# Patient Record
Sex: FEMALE | Race: BLACK OR AFRICAN AMERICAN | NOT HISPANIC OR LATINO
[De-identification: names, ages, dates, MRNs, and addresses within clinical notes are randomized per-mention and may not be internally consistent; named-entity substitution may affect disease eponyms.]

---

## 2017-02-21 ENCOUNTER — APPOINTMENT (OUTPATIENT)
Dept: PULMONOLOGY | Facility: CLINIC | Age: 62
End: 2017-02-21

## 2017-03-28 ENCOUNTER — APPOINTMENT (OUTPATIENT)
Dept: PULMONOLOGY | Facility: CLINIC | Age: 62
End: 2017-03-28

## 2017-03-28 VITALS
OXYGEN SATURATION: 73 % | BODY MASS INDEX: 32.62 KG/M2 | SYSTOLIC BLOOD PRESSURE: 130 MMHG | WEIGHT: 203 LBS | HEIGHT: 66 IN | HEART RATE: 94 BPM | TEMPERATURE: 98.6 F | DIASTOLIC BLOOD PRESSURE: 82 MMHG

## 2017-03-28 RX ORDER — LEVALBUTEROL TARTRATE 45 UG/1
45 AEROSOL, METERED ORAL
Qty: 1 | Refills: 2 | Status: ACTIVE | COMMUNITY
Start: 2017-03-28 | End: 1900-01-01

## 2017-03-30 ENCOUNTER — OTHER (OUTPATIENT)
Age: 62
End: 2017-03-30

## 2017-05-18 ENCOUNTER — FORM ENCOUNTER (OUTPATIENT)
Age: 62
End: 2017-05-18

## 2017-05-19 ENCOUNTER — OUTPATIENT (OUTPATIENT)
Dept: OUTPATIENT SERVICES | Facility: HOSPITAL | Age: 62
LOS: 1 days | End: 2017-05-19
Payer: MEDICARE

## 2017-05-19 PROCEDURE — 71250 CT THORAX DX C-: CPT

## 2017-05-19 PROCEDURE — 71250 CT THORAX DX C-: CPT | Mod: 26

## 2017-06-01 ENCOUNTER — APPOINTMENT (OUTPATIENT)
Dept: PULMONOLOGY | Facility: CLINIC | Age: 62
End: 2017-06-01

## 2017-06-01 VITALS
HEART RATE: 77 BPM | HEIGHT: 66 IN | TEMPERATURE: 98.6 F | SYSTOLIC BLOOD PRESSURE: 130 MMHG | WEIGHT: 197 LBS | OXYGEN SATURATION: 93 % | BODY MASS INDEX: 31.66 KG/M2 | DIASTOLIC BLOOD PRESSURE: 80 MMHG

## 2017-06-01 RX ORDER — TADALAFIL 20 MG/1
20 TABLET ORAL
Qty: 60 | Refills: 1 | Status: DISCONTINUED | COMMUNITY
Start: 2017-03-30 | End: 2017-06-01

## 2017-06-01 RX ORDER — LEVOCETIRIZINE DIHYDROCHLORIDE 5 MG/1
5 TABLET ORAL DAILY
Qty: 1 | Refills: 1 | Status: ACTIVE | COMMUNITY
Start: 2017-06-01 | End: 1900-01-01

## 2017-06-01 RX ORDER — LEVOCETIRIZINE DIHYDROCHLORIDE 5 MG/1
5 TABLET, FILM COATED ORAL DAILY
Qty: 1 | Refills: 1 | Status: DISCONTINUED | OUTPATIENT
Start: 2017-03-28 | End: 2017-06-01

## 2017-06-06 LAB — A1AT SERPL-MCNC: 117 MG/DL

## 2017-06-14 ENCOUNTER — OTHER (OUTPATIENT)
Age: 62
End: 2017-06-14

## 2017-07-05 ENCOUNTER — FORM ENCOUNTER (OUTPATIENT)
Age: 62
End: 2017-07-05

## 2017-07-06 ENCOUNTER — OUTPATIENT (OUTPATIENT)
Dept: OUTPATIENT SERVICES | Facility: HOSPITAL | Age: 62
LOS: 1 days | End: 2017-07-06
Payer: MEDICARE

## 2017-07-06 DIAGNOSIS — I27.2 OTHER SECONDARY PULMONARY HYPERTENSION: ICD-10-CM

## 2017-07-06 PROCEDURE — 93306 TTE W/DOPPLER COMPLETE: CPT | Mod: 26

## 2017-07-06 PROCEDURE — 93306 TTE W/DOPPLER COMPLETE: CPT

## 2017-07-26 ENCOUNTER — RX RENEWAL (OUTPATIENT)
Age: 62
End: 2017-07-26

## 2017-08-15 ENCOUNTER — APPOINTMENT (OUTPATIENT)
Dept: PULMONOLOGY | Facility: CLINIC | Age: 62
End: 2017-08-15

## 2017-10-31 ENCOUNTER — RX RENEWAL (OUTPATIENT)
Age: 62
End: 2017-10-31

## 2017-12-26 ENCOUNTER — APPOINTMENT (OUTPATIENT)
Dept: PULMONOLOGY | Facility: CLINIC | Age: 62
End: 2017-12-26

## 2018-02-01 ENCOUNTER — RX RENEWAL (OUTPATIENT)
Age: 63
End: 2018-02-01

## 2018-02-08 ENCOUNTER — RX RENEWAL (OUTPATIENT)
Age: 63
End: 2018-02-08

## 2018-02-08 RX ORDER — SOFT LENS DISINFECTANT
SOLUTION, NON-ORAL MISCELLANEOUS
Qty: 1 | Refills: 0 | Status: ACTIVE | COMMUNITY
Start: 2018-02-08 | End: 1900-01-01

## 2018-02-18 ENCOUNTER — RX RENEWAL (OUTPATIENT)
Age: 63
End: 2018-02-18

## 2018-02-22 ENCOUNTER — APPOINTMENT (OUTPATIENT)
Dept: PULMONOLOGY | Facility: CLINIC | Age: 63
End: 2018-02-22
Payer: MEDICARE

## 2018-02-22 VITALS
OXYGEN SATURATION: 96 % | DIASTOLIC BLOOD PRESSURE: 80 MMHG | HEIGHT: 68 IN | HEART RATE: 79 BPM | BODY MASS INDEX: 28.79 KG/M2 | WEIGHT: 190 LBS | TEMPERATURE: 98.3 F | SYSTOLIC BLOOD PRESSURE: 144 MMHG

## 2018-02-22 PROCEDURE — ZZZZZ: CPT

## 2018-02-22 PROCEDURE — 94060 EVALUATION OF WHEEZING: CPT

## 2018-02-22 PROCEDURE — 99215 OFFICE O/P EST HI 40 MIN: CPT | Mod: 25

## 2018-02-22 PROCEDURE — 94729 DIFFUSING CAPACITY: CPT

## 2018-02-22 PROCEDURE — 94727 GAS DIL/WSHOT DETER LNG VOL: CPT

## 2018-03-19 ENCOUNTER — RX RENEWAL (OUTPATIENT)
Age: 63
End: 2018-03-19

## 2018-03-20 ENCOUNTER — RX RENEWAL (OUTPATIENT)
Age: 63
End: 2018-03-20

## 2018-04-16 ENCOUNTER — FORM ENCOUNTER (OUTPATIENT)
Age: 63
End: 2018-04-16

## 2018-04-16 ENCOUNTER — RX RENEWAL (OUTPATIENT)
Age: 63
End: 2018-04-16

## 2018-04-17 ENCOUNTER — OUTPATIENT (OUTPATIENT)
Dept: OUTPATIENT SERVICES | Facility: HOSPITAL | Age: 63
LOS: 1 days | End: 2018-04-17
Payer: MEDICARE

## 2018-04-17 DIAGNOSIS — I27.20 PULMONARY HYPERTENSION, UNSPECIFIED: ICD-10-CM

## 2018-04-17 PROCEDURE — 93306 TTE W/DOPPLER COMPLETE: CPT

## 2018-04-17 PROCEDURE — 93306 TTE W/DOPPLER COMPLETE: CPT | Mod: 26

## 2018-04-24 ENCOUNTER — APPOINTMENT (OUTPATIENT)
Dept: PULMONOLOGY | Facility: CLINIC | Age: 63
End: 2018-04-24
Payer: MEDICARE

## 2018-04-24 VITALS
SYSTOLIC BLOOD PRESSURE: 130 MMHG | WEIGHT: 186 LBS | HEART RATE: 65 BPM | TEMPERATURE: 97.6 F | HEIGHT: 68 IN | OXYGEN SATURATION: 95 % | BODY MASS INDEX: 28.19 KG/M2 | DIASTOLIC BLOOD PRESSURE: 80 MMHG

## 2018-04-24 DIAGNOSIS — Z87.891 PERSONAL HISTORY OF NICOTINE DEPENDENCE: ICD-10-CM

## 2018-04-24 PROCEDURE — 94618 PULMONARY STRESS TESTING: CPT

## 2018-04-24 PROCEDURE — 94729 DIFFUSING CAPACITY: CPT

## 2018-04-24 PROCEDURE — ZZZZZ: CPT

## 2018-04-24 PROCEDURE — 99215 OFFICE O/P EST HI 40 MIN: CPT | Mod: 25

## 2018-04-24 PROCEDURE — 94010 BREATHING CAPACITY TEST: CPT | Mod: 59

## 2018-04-26 ENCOUNTER — OTHER (OUTPATIENT)
Age: 63
End: 2018-04-26

## 2018-05-09 ENCOUNTER — RX RENEWAL (OUTPATIENT)
Age: 63
End: 2018-05-09

## 2018-06-01 ENCOUNTER — CLINICAL ADVICE (OUTPATIENT)
Age: 63
End: 2018-06-01

## 2018-06-13 ENCOUNTER — RX RENEWAL (OUTPATIENT)
Age: 63
End: 2018-06-13

## 2018-06-26 ENCOUNTER — APPOINTMENT (OUTPATIENT)
Dept: PULMONOLOGY | Facility: CLINIC | Age: 63
End: 2018-06-26

## 2018-09-06 ENCOUNTER — RX RENEWAL (OUTPATIENT)
Age: 63
End: 2018-09-06

## 2018-09-27 ENCOUNTER — APPOINTMENT (OUTPATIENT)
Dept: PULMONOLOGY | Facility: CLINIC | Age: 63
End: 2018-09-27
Payer: MEDICARE

## 2018-09-27 VITALS
SYSTOLIC BLOOD PRESSURE: 110 MMHG | BODY MASS INDEX: 28.04 KG/M2 | DIASTOLIC BLOOD PRESSURE: 70 MMHG | RESPIRATION RATE: 12 BRPM | TEMPERATURE: 98.6 F | HEIGHT: 68 IN | HEART RATE: 84 BPM | OXYGEN SATURATION: 91 % | WEIGHT: 185 LBS

## 2018-09-27 PROCEDURE — 99215 OFFICE O/P EST HI 40 MIN: CPT

## 2018-09-27 RX ORDER — LEVOCETIRIZINE DIHYDROCHLORIDE 5 MG/1
5 TABLET ORAL DAILY
Qty: 30 | Refills: 3 | Status: ACTIVE | COMMUNITY
Start: 2018-09-27 | End: 1900-01-01

## 2019-01-22 ENCOUNTER — APPOINTMENT (OUTPATIENT)
Dept: PULMONOLOGY | Facility: CLINIC | Age: 64
End: 2019-01-22

## 2019-03-13 ENCOUNTER — RX RENEWAL (OUTPATIENT)
Age: 64
End: 2019-03-13

## 2019-04-22 ENCOUNTER — RX RENEWAL (OUTPATIENT)
Age: 64
End: 2019-04-22

## 2019-05-23 ENCOUNTER — APPOINTMENT (OUTPATIENT)
Dept: PULMONOLOGY | Facility: CLINIC | Age: 64
End: 2019-05-23
Payer: MEDICARE

## 2019-05-23 VITALS
WEIGHT: 187 LBS | OXYGEN SATURATION: 95 % | DIASTOLIC BLOOD PRESSURE: 80 MMHG | TEMPERATURE: 98.7 F | BODY MASS INDEX: 28.34 KG/M2 | SYSTOLIC BLOOD PRESSURE: 130 MMHG | HEIGHT: 68 IN | HEART RATE: 85 BPM

## 2019-05-23 PROCEDURE — 99215 OFFICE O/P EST HI 40 MIN: CPT | Mod: 25

## 2019-05-23 PROCEDURE — 90670 PCV13 VACCINE IM: CPT

## 2019-05-23 PROCEDURE — G0009: CPT

## 2019-05-23 NOTE — REASON FOR VISIT
[Follow-Up] : a follow-up visit [Asthma] : asthma [COPD] : COPD [Pulmonary Hypertension] : pulmonary hypertension

## 2019-05-25 NOTE — HISTORY OF PRESENT ILLNESS
[Worsened] : have worsened [None] : ~He/She~ has no significant interval events [Difficulty Breathing During Exertion] : worsened dyspnea on exertion [Feelings Of Weakness On Exertion] : stable exercise intolerance [Cough] : denies coughing [Wheezing] : denies wheezing [Regional Soft Tissue Swelling Both Lower Extremities] : denies lower extremity edema [Chest Pain Or Discomfort] : denies chest pain [Fever] : denies fever [Obstructive Sleep Apnea] : obstructive sleep apnea [FreeTextEntry1] : 63 yo AAF with Asthma/COPD presents for follow-up. Pt with diffuse centrilobular emphysema on triple therapy and daliresp, pulmonary HTN  with PAS 43mmHg on RHC on sildenafil, stable pulmonary nodules, and severe exercise restriction\par \par 5/23/19:\par She states she feels she caught a cold 2 days with nasal congestion and cough. She saw ENT yesterday who prescribed abx for sinus infection which she hasn't started yet, she'll pick them up today. She also has Rx Nasonex, which she is using once per day. She has slight wheeze when she coughs. Wasn't wheezing before that. \par She states her arthritis is bothering her in the neck and she has pain at right back at the waist. She is seeing her PMD tomorrow and has h/o kidney stones.\par She is CPAP compliant and uses it every day. She has nasal mask so has been harder to use the last two nights due to nasal congestion. \par She is on Breo 200 and Incruse. \par She is taking sildenafil 20mg BID; she states that when she was taking it TID, it caused palpitations. She was on TID dosing for about 2 months. She went back to BID dosing earlier this year. \par Still gets winded walking up 15 steps; will need to catch her breath at the top of the stairs. She can walk < 1 block before she gets short of breath. Denies chest pain. She states she has portable tanks but they're too heavy so she is not using supplemental O2 with ambulation. She may use supplemental O2 when she gets back home.

## 2019-05-25 NOTE — DISCUSSION/SUMMARY
[FreeTextEntry1] : ATTENDING'S Pertinent Exam:\par 5-23-19\par -no pallor, no icterus, arcus senilis\par -no cervical adenopathy, no JVD at 45 degrees, no cervical adenopathy, no hepatojugular reflux\par -P 2 not loud.  heart sounds are distant  \par -adequate air entry bilaterally, mildly diminished breath sounds in right infrascapular area, deep breathing provoked minimal cough\par -No articular manifestations of collagen vascular disease. \par -no lower extremity edema\par

## 2019-05-25 NOTE — END OF VISIT
[] : Fellow [>50% of Time Spent on Counseling and Coordination of Care for  ___] : Greater than 50% of the encounter time was spent on counseling and coordination of care for [unfilled] [Time Spent: ___ minutes] : I have spent [unfilled] minutes of face to face time with the patient [FreeTextEntry3] : All medical record entries made by the Scribe were at my, Dr. Larry Epps's direction and personally dictated by me.   I have reviewed the chart and agree that the record accurately reflects my personal performance of the history, physical exam, assessment and plan. I have also personally directed, reviewed, and agreed with the chart. [FreeTextEntry1] : SR

## 2019-05-25 NOTE — REVIEW OF SYSTEMS
[Eye Irritation] : ~T irritation of the eyes [Nasal Congestion] : nasal congestion [Postnasal Drip] : postnasal drip [Sinus Problems] : sinus problems [Dyspnea] : dyspnea [Wheezing] : wheezing [Hypertension] : ~T hypertension [Dysrhythmia] : dysrhythmia [Palpitations] : palpitations [Hay Fever] : hay fever [Itchy Eyes] : itching of ~T the eyes [Watery Eyes] : ~T eyes watering / discharge [Nasal Discharge] : nasal discharge [Arthralgias] : arthralgias [Headache] : headache [As Noted in HPI] : as noted in HPI [Negative] : Pulmonary Hypertension [Fever] : no fever [Chills] : no chills [Fatigue] : no fatigue [Poor Appetite] : normal appetite  [Dry Eyes] : no dryness of the eyes [Sore Throat] : no sore throat [Cough] : no cough [Sputum] : not coughing up ~M sputum [Hemoptysis] : no hemoptysis [Pleuritic Pain] : no pleuritic pain [Chest Tightness] : no chest tightness [Frequent URIs] : no frequent upper respiratory infections [Reflux] : no reflux [Hives] : no urticaria was observed [Heartburn] : no heartburn [Dysphagia] : no dysphagia [Nausea] : no nausea [Vomiting] : no vomiting [Nocturia] : no nocturia [Urgency] : no feelings of urinary urgency [Frequency] : no change in urinary frequency [Raynaud] : no Raynaud's phenomenon was observed [Dysuria] : no dysuria [Rash] : no [unfilled] rash [Scleroderma] : no scleroderma [Anemia] : no anemia [Clotting Disorder] : no clotting disorder [Dizziness] : no dizziness [Focal Weakness] : no focal weakness [Easy Bruising] : no ~M tendency for easy bruising [FreeTextEntry2] : using Refresh drops for dry eyes as per ENT [FreeTextEntry9] : Afib periodically, not frequently [Head Injury] : no head injury [de-identified] : Following seasonal allergies with ENT [FreeTextEntry7] : gastritis [de-identified] : on CPAP [de-identified] : occasional sinus headaches [FreeTextEntry6] : B/L knee pain from arthritis

## 2019-05-25 NOTE — PHYSICAL EXAM
[General Appearance - Well Developed] : well developed [Normal Appearance] : normal appearance [Well Groomed] : well groomed [General Appearance - Well Nourished] : well nourished [No Deformities] : no deformities [General Appearance - In No Acute Distress] : no acute distress [Normal Conjunctiva] : the conjunctiva exhibited no abnormalities [IV] : IV [Neck Appearance] : the appearance of the neck was normal [Heart Rate And Rhythm] : heart rate and rhythm were normal [Heart Sounds] : normal S1 and S2 [Murmurs] : no murmurs present [Arterial Pulses Normal] : the arterial pulses were normal [Respiration, Rhythm And Depth] : normal respiratory rhythm and effort [Exaggerated Use Of Accessory Muscles For Inspiration] : no accessory muscle use [Lungs Percussion] : the lungs were normal to percussion [Bowel Sounds] : normal bowel sounds [Abdomen Soft] : soft [Abdomen Tenderness] : non-tender [Abnormal Walk] : normal gait [Gait - Sufficient For Exercise Testing] : the gait was sufficient for exercise testing [Nail Clubbing] : no clubbing of the fingernails [Cyanosis, Localized] : no localized cyanosis [Skin Color & Pigmentation] : normal skin color and pigmentation [] : no rash [Skin Lesions] : no skin lesions [No Focal Deficits] : no focal deficits [Oriented To Time, Place, And Person] : oriented to person, place, and time [Impaired Insight] : insight and judgment were intact [Affect] : the affect was normal [Mood] : the mood was normal [FreeTextEntry2] : no lower extremity edema [FreeTextEntry1] : No articular manifestations of collagen vascular disease.

## 2019-05-25 NOTE — CONSULT LETTER
[Dear  ___] : Dear ~DON, [Consult Letter:] : I had the pleasure of evaluating your patient, [unfilled]. [Please see my note below.] : Please see my note below. [Consult Closing:] : Thank you very much for allowing me to participate in the care of this patient.  If you have any questions, please do not hesitate to contact me. [Sincerely,] : Sincerely, [Larry Epps, JOSE, FCCP, FCCM] : JOSE Quach, FCCP, FCCM [FreeTextEntry2] : Charly Kebede MD

## 2019-05-25 NOTE — PROCEDURE
[FreeTextEntry1] : Abhinav, DLCO, 6MWT 18:\par FVC: 2.61 L (93%)\par FEV1: 1.22 L (56%)\par FEV1/FVC: 47%\par BGZ46-99%: 0.39 L/s (19%)\par DLCO: 7.5 (32%)\par 6MWT: 240 meters, SpO2 start; 95% --> spO2 end: 85%\par Moderately severe obstructive defect. Severely reduced diffusion capacity.  6MWT was aborted at 4 minutes due to desaturation; pt also notes walk distance was limited secondary to knee pain. Reduced walk distance with significant desaturation. \par \par EXAM:  ECHOCARDIOGRAM (CARDIOL)                      PROCEDURE DATE:  2018  \par Normal left ventricular size and wall thickness.The left ventricular wall  motion is normal.The left ventricular ejection fraction is normal.The left ventricular ejection fraction is 60%.The right ventricle is normal in size and function.The left atrial size is normal.Right atrial size is normal.Structurally normal aortic valve.No aortic regurgitation noted.Mildly calcified anterior leaflet of the mitral valve.There is mild to moderate mitral regurgitation.Structurally normal tricuspid valve.There is trace to mild tricuspid regurgitation.There is mild pulmonary hypertension.The pulmonary artery systolic pressure is estimated to be 40 mmHg.Structurally normal pulmonic valve.There is trace pulmonic regurgitation.There is no\par  pericardial effusion.There is no significant change when compared to the echo from 2017.\par \par Spirometry\par FVC: 2.83 100%\par FEV1: 1.19 54%\par FEV1/FVC: 42%\par DLCO: 31%\par \par AAT: WNL (117)\par \par Echo: 17: EF 55-60%, mild PHTN with PASP 40mmHg\par \par Spirometry and DLCO 17:\par FVC: 2.71 L (95% pred) --> 2.58 L (90% pred)\par FEV1: 1.20 L (54% pred) --> 1.14 L (51% pred)\par FEV1/FVC: 44% --> 44%\par GHZ20-68%: 0.30 L/s (14% pred) --> 0.35 L/s (16% pred)\par DLCO: 8.0 (32% pred)\par Severe obstruction. Severely reduced diffusion capacity.\par \par PI max 17: 04acN3H (55% pred)\par \par 6MWT: \par Distance: 382m\par SpO2 start: 95%\par SpO2 end: 88%\par Vastly improved walk distance (about 300m more than last) and improvement in degree of desaturation (but still with significant desaturation). \par \par PFT 3/28/17:\par FVC: 2.39 L (83% pred) --> 2.22 L (77% pred)\par FEV1: 1.14 L (50% pred) --> 1.09 L (48% pred)\par FEV1/FVC: 48% --> 49%\par IFK40-69%: 0.42 L/s (19% pred) --> 0.40 L/s (18% pred)\par T.84 L (92% pred)\par RV/T%\par DLCO: 8.1 (33% pred)\par Severe obstructive defect. Normal lung volumes. Very severely reduced DLCO.  Air trapping. \par \par 6MWT 3/28/17: Pt declined due to pain from heel spur\par \par [place] with bronchodilator 16:\par FVC: 2.34 L (83% pred) --> 2.67 L (94% pred)\par FEV1: 1.07 L (48% pred) --> 1.24 L (56% pred)\par FEV1/FVC: 46% --> 46%\par TSX54-50%: 0.38 L/s (18% pred) --> 0.41 L/s (19% pred)\par 330cc increase in FVC post-bronchodilator. Moderate obstruction. \par \par CXR 16: pt somewhat rotated, haziness in the LLL which is also seen on the lateral film, cannot rule out pneumonia of the LLL\par \par PREVIOUS TESTING: \par Spirometry 16:\par FVC: 2.71 L (94% pred)\par FEv1: 1.23 L (55% pred)\par FEV1/FVC: 46%\par UYA50-78%: 0.42 L/s (19% pred)\par Improvement in FEV1 and FVC as compared to prior test.  Severe obstructive lung disease. \par \par DLCO 16: 9.3 (33% pred <-- 29%)\par Severely reduced diffusion capacity.\par \par 6MWT 16: \par Walk time: 2:00\par Distance: 86m\par SpO2 start: 89%\par SpO2 end: 82%\par Reduced walk distance but test aborted early due to desaturation.  Degree of desaturation improved since previous test. \par \par \par PFTs 16\par FVC: 1.71 (59%)\par FEV1: 0.98 (44%)\par FEV1/FVC 58%\par FEF25/75: 0.59 (27%)\par T.38 (83%)\par DLCO: 7.3 (29%)\par Severe obstructive lung disease with severe reduced DLCO. No significant bronchodilator response\par \par PFTs 16\par FVC: 2.57 89%\par FEV1: 1.19 52%\par FEV1/FVC: 46%\par DLCO 37%\par \par 6MWT:\par Baseline: 88% End: 74%\par 86 meters\par \par PFTs 12/17/15\par FVC: 2.73 (94% of pred)\par FEV1: 1.31 l (57% of pred)\par FEV1/FVC: 48%\par TFA99-33%: 0.36 L/s (59% of pred)\par %\par DLCO 43%\par \par Pt declining 6MWT today \par PRIOR STUDIES\par Spirometry:\par FVC: 2.46 L (86% of pred)\par FEV1: 1.26 l (56% of pred)\par FEV1/FVC: 51.2%\par ANA56-76%: 0.64 L/s (29% of pred)\par C/w moderate obstructive pulmonary disease.\par \par \par RHC 10/13/15:\par -Pulmonary capillary occlusion pressure: 17 mm of Hg\par -Pulmonary artery systolic: 40 mmHg\par -Pulmonary artery diastolic: 20mm Hg\par -Mean pulmonary artery pressure: 29 mm Hg\par -Right atrial pressure: 12mm Hg\par -Cardiac output was 4.43\par Based upon these numbers, the pulmonary artery diastolic and the wedge gradient is normal at 3. The trans-pulmonary pressure gradient is mildly elevated at 12. With a cardiac output of 4.43 L per minute, the pulmonary vascular resistance is normal at slightly less than 3 Woods units.  There is no increase in intrinsic pulmonary vascular resistance. The pulmonary artery pressures are also not elevated out of proportion to the left-sided pressures therefore the patient is not a candidate for advanced vasodilator therapy.\par \par \par PREVIOUS TESTING:\par TTE 12/19/15:\par LVH, nomal LVEF PA sys press 34mmHg, mild MR & TR\par \par CT chest CTAPE 08/10/15\par IMPRESSION: No evidence for pulmonary emboli. No significant change in moderate emphysematous changes. No significant change in main pulmonary arterial dilatation, possibly reflecting sequelae of pulmonary arterial hypertension.

## 2019-05-25 NOTE — ASSESSMENT
[FreeTextEntry1] : 5-23-19\par Marce's respiratory issues are summarized:\par \par 1.   Moderately obstructive airways disease (GOLD stage C- high risk because of exacerbations).    \par Her Alpha 1 antitrypsin levels are normal.  This was done since she has extensive emphysema B/L in both upper and lower lobes. She is using Breo and Incruse due to her reduced inspiratory flows.   \par Additionally, since she was having frequent exacerbations of COPD, she was started on Daliresp, which has reduced the frequency of AECOPD. \par She has been evaluated by Dr. Jose Arzate at the [facility]. We have requested her to follow up with him again.\par I would also like to get a noncontrast CT chest at this time. We will assess her for valve placement for COPD\par \par 2.   Pulmonary hypertension and hypoxemic respiratory failure\par Marce falls in the 10% of patients with COPD who develop pulmonary hypertension.    She remains on Sildenafil 20 mg bid without any significant side effects.    Her BP is 130/80 (she did not take her blood pressure medication this morning).  Because Marce is symptomatic with significant shortness of breath, she requires supplemental O2, and we need to decrease her pulmonary pressures and attempt to prevent right-heart failure, we will increase her sildenafil by 20 mg every week if well tolerated.  She will increase to:  \par -sildenafil 20 mg TID x 1 week\par -then sildenafil 20 mg in AM, 20 mg in afternoon, and 40 mg HS x 1 week\par -then sildenafil 40 mg in AM, 20 mg in afternoon, and 40 mg HS x 1 week\par -then sildenafil 40 mg TID thereafter. \par She is to continue monitoring her blood pressure as we titrate to the optimal dosage. She will call us with any problems she may experience during the titration. \par She can only walk less than one block before she needs to stop and catch her breath. She cannot carry the portable O2 tanks as they are too heavy; we had recommended that she use 4 L/min with ambulation in the past. We will try to get a portable O2 concentrator for Marce as she needs to use supplemental O2 when she ambulates. If a portable concentrator is not available, we will try to get a smaller portable tank for her, as I'd prefer her to use 2 L/min with ambulation versus nothing at all.  We will also order a full PFT and 6MWT; we will also get an echocardiogram to reassess the right-sided pressures.\par \par 3.   GEO\par Marce has GEO and is using CPAP every night.   No compliance issues or complaints were noted. \par \par 4.   Pulmonary nodules \par 3 mm nodule in left upper lobe x2 and lingula. All are micro-nodules and do not need further follow-up.  \par \par 5. Health maintenance\par Marce was administered Prevnar (13 valent) today. She will be due for Pneumovax in 05/2020.\par \par Return in 3 months

## 2019-05-26 ENCOUNTER — EMERGENCY (EMERGENCY)
Facility: HOSPITAL | Age: 64
LOS: 0 days | Discharge: HOME | End: 2019-05-26
Attending: EMERGENCY MEDICINE
Payer: MEDICARE

## 2019-05-26 VITALS
TEMPERATURE: 98 F | OXYGEN SATURATION: 96 % | DIASTOLIC BLOOD PRESSURE: 76 MMHG | HEART RATE: 74 BPM | SYSTOLIC BLOOD PRESSURE: 121 MMHG | RESPIRATION RATE: 16 BRPM

## 2019-05-26 VITALS
RESPIRATION RATE: 14 BRPM | SYSTOLIC BLOOD PRESSURE: 141 MMHG | DIASTOLIC BLOOD PRESSURE: 81 MMHG | TEMPERATURE: 99 F | HEIGHT: 66 IN | OXYGEN SATURATION: 95 % | HEART RATE: 82 BPM | WEIGHT: 186.07 LBS

## 2019-05-26 DIAGNOSIS — E78.00 PURE HYPERCHOLESTEROLEMIA, UNSPECIFIED: ICD-10-CM

## 2019-05-26 DIAGNOSIS — S39.012A STRAIN OF MUSCLE, FASCIA AND TENDON OF LOWER BACK, INITIAL ENCOUNTER: ICD-10-CM

## 2019-05-26 DIAGNOSIS — N39.0 URINARY TRACT INFECTION, SITE NOT SPECIFIED: ICD-10-CM

## 2019-05-26 DIAGNOSIS — M54.9 DORSALGIA, UNSPECIFIED: ICD-10-CM
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
## 2019-05-26 DIAGNOSIS — I48.91 UNSPECIFIED ATRIAL FIBRILLATION: ICD-10-CM

## 2019-05-26 DIAGNOSIS — I10 ESSENTIAL (PRIMARY) HYPERTENSION: ICD-10-CM

## 2019-05-26 LAB
ALBUMIN SERPL ELPH-MCNC: 4.5 G/DL — SIGNIFICANT CHANGE UP (ref 3.3–5)
ALP SERPL-CCNC: 56 U/L — SIGNIFICANT CHANGE UP (ref 40–120)
ALT FLD-CCNC: 21 U/L — SIGNIFICANT CHANGE UP (ref 12–78)
ANION GAP SERPL CALC-SCNC: 7 MMOL/L — SIGNIFICANT CHANGE UP (ref 5–17)
APPEARANCE UR: CLEAR — SIGNIFICANT CHANGE UP
APTT BLD: 34.2 SEC — SIGNIFICANT CHANGE UP (ref 28.5–37)
AST SERPL-CCNC: 17 U/L — SIGNIFICANT CHANGE UP (ref 15–37)
BASOPHILS # BLD AUTO: 0.02 K/UL — SIGNIFICANT CHANGE UP (ref 0–0.2)
BASOPHILS NFR BLD AUTO: 0.3 % — SIGNIFICANT CHANGE UP (ref 0–2)
BILIRUB SERPL-MCNC: 0.8 MG/DL — SIGNIFICANT CHANGE UP (ref 0.2–1.2)
BILIRUB UR-MCNC: NEGATIVE — SIGNIFICANT CHANGE UP
BUN SERPL-MCNC: 10 MG/DL — SIGNIFICANT CHANGE UP (ref 7–23)
CALCIUM SERPL-MCNC: 9.1 MG/DL — SIGNIFICANT CHANGE UP (ref 8.5–10.1)
CHLORIDE SERPL-SCNC: 110 MMOL/L — HIGH (ref 96–108)
CO2 SERPL-SCNC: 28 MMOL/L — SIGNIFICANT CHANGE UP (ref 22–31)
COLOR SPEC: YELLOW — SIGNIFICANT CHANGE UP
CREAT SERPL-MCNC: 0.8 MG/DL — SIGNIFICANT CHANGE UP (ref 0.5–1.3)
DIFF PNL FLD: ABNORMAL
EOSINOPHIL # BLD AUTO: 0.05 K/UL — SIGNIFICANT CHANGE UP (ref 0–0.5)
EOSINOPHIL NFR BLD AUTO: 0.6 % — SIGNIFICANT CHANGE UP (ref 0–6)
EPI CELLS # UR: SIGNIFICANT CHANGE UP
GLUCOSE SERPL-MCNC: 95 MG/DL — SIGNIFICANT CHANGE UP (ref 70–99)
GLUCOSE UR QL: NEGATIVE MG/DL — SIGNIFICANT CHANGE UP
HCT VFR BLD CALC: 39.9 % — SIGNIFICANT CHANGE UP (ref 34.5–45)
HGB BLD-MCNC: 13.4 G/DL — SIGNIFICANT CHANGE UP (ref 11.5–15.5)
IMM GRANULOCYTES NFR BLD AUTO: 0.4 % — SIGNIFICANT CHANGE UP (ref 0–1.5)
INR BLD: 1.06 RATIO — SIGNIFICANT CHANGE UP (ref 0.88–1.16)
KETONES UR-MCNC: NEGATIVE — SIGNIFICANT CHANGE UP
LEUKOCYTE ESTERASE UR-ACNC: ABNORMAL
LYMPHOCYTES # BLD AUTO: 2.33 K/UL — SIGNIFICANT CHANGE UP (ref 1–3.3)
LYMPHOCYTES # BLD AUTO: 29.8 % — SIGNIFICANT CHANGE UP (ref 13–44)
MCHC RBC-ENTMCNC: 30.7 PG — SIGNIFICANT CHANGE UP (ref 27–34)
MCHC RBC-ENTMCNC: 33.6 GM/DL — SIGNIFICANT CHANGE UP (ref 32–36)
MCV RBC AUTO: 91.3 FL — SIGNIFICANT CHANGE UP (ref 80–100)
MONOCYTES # BLD AUTO: 0.51 K/UL — SIGNIFICANT CHANGE UP (ref 0–0.9)
MONOCYTES NFR BLD AUTO: 6.5 % — SIGNIFICANT CHANGE UP (ref 2–14)
NEUTROPHILS # BLD AUTO: 4.89 K/UL — SIGNIFICANT CHANGE UP (ref 1.8–7.4)
NEUTROPHILS NFR BLD AUTO: 62.4 % — SIGNIFICANT CHANGE UP (ref 43–77)
NITRITE UR-MCNC: NEGATIVE — SIGNIFICANT CHANGE UP
NRBC # BLD: 0 /100 WBCS — SIGNIFICANT CHANGE UP (ref 0–0)
PH UR: 5 — SIGNIFICANT CHANGE UP (ref 5–8)
PLATELET # BLD AUTO: 219 K/UL — SIGNIFICANT CHANGE UP (ref 150–400)
POTASSIUM SERPL-MCNC: 4.2 MMOL/L — SIGNIFICANT CHANGE UP (ref 3.5–5.3)
POTASSIUM SERPL-SCNC: 4.2 MMOL/L — SIGNIFICANT CHANGE UP (ref 3.5–5.3)
PROT SERPL-MCNC: 8 GM/DL — SIGNIFICANT CHANGE UP (ref 6–8.3)
PROT UR-MCNC: NEGATIVE MG/DL — SIGNIFICANT CHANGE UP
PROTHROM AB SERPL-ACNC: 11.9 SEC — SIGNIFICANT CHANGE UP (ref 10–12.9)
RBC # BLD: 4.37 M/UL — SIGNIFICANT CHANGE UP (ref 3.8–5.2)
RBC # FLD: 11.8 % — SIGNIFICANT CHANGE UP (ref 10.3–14.5)
RBC CASTS # UR COMP ASSIST: ABNORMAL /HPF (ref 0–4)
SODIUM SERPL-SCNC: 145 MMOL/L — SIGNIFICANT CHANGE UP (ref 135–145)
SP GR SPEC: 1.01 — SIGNIFICANT CHANGE UP (ref 1.01–1.02)
UROBILINOGEN FLD QL: NEGATIVE MG/DL — SIGNIFICANT CHANGE UP
WBC # BLD: 7.83 K/UL — SIGNIFICANT CHANGE UP (ref 3.8–10.5)
WBC # FLD AUTO: 7.83 K/UL — SIGNIFICANT CHANGE UP (ref 3.8–10.5)
WBC UR QL: SIGNIFICANT CHANGE UP

## 2019-05-26 PROCEDURE — 99284 EMERGENCY DEPT VISIT MOD MDM: CPT

## 2019-05-26 PROCEDURE — 74176 CT ABD & PELVIS W/O CONTRAST: CPT | Mod: 26

## 2019-05-26 RX ORDER — NITROFURANTOIN MACROCRYSTAL 50 MG
1 CAPSULE ORAL
Qty: 14 | Refills: 0
Start: 2019-05-26 | End: 2019-06-01

## 2019-05-26 RX ORDER — NITROFURANTOIN MACROCRYSTAL 50 MG
100 CAPSULE ORAL ONCE
Refills: 0 | Status: COMPLETED | OUTPATIENT
Start: 2019-05-26 | End: 2019-05-26

## 2019-05-26 RX ORDER — KETOROLAC TROMETHAMINE 30 MG/ML
30 SYRINGE (ML) INJECTION ONCE
Refills: 0 | Status: DISCONTINUED | OUTPATIENT
Start: 2019-05-26 | End: 2019-05-26

## 2019-05-26 RX ORDER — TRAMADOL HYDROCHLORIDE 50 MG/1
50 TABLET ORAL ONCE
Refills: 0 | Status: DISCONTINUED | OUTPATIENT
Start: 2019-05-26 | End: 2019-05-26

## 2019-05-26 RX ORDER — TRAMADOL HYDROCHLORIDE 50 MG/1
1 TABLET ORAL
Qty: 12 | Refills: 0
Start: 2019-05-26 | End: 2019-05-28

## 2019-05-26 RX ADMIN — Medication 100 MILLIGRAM(S): at 12:54

## 2019-05-26 RX ADMIN — Medication 30 MILLIGRAM(S): at 10:47

## 2019-05-26 RX ADMIN — TRAMADOL HYDROCHLORIDE 50 MILLIGRAM(S): 50 TABLET ORAL at 10:46

## 2019-05-26 NOTE — ED ADULT NURSE NOTE - PMH
Asthma    Atrial fibrillation    Chronic sinusitis    COPD (chronic obstructive pulmonary disease)    Essential hypertension    GERD (gastroesophageal reflux disease)    Hypercholesterolemia    Sleep apnea

## 2019-05-26 NOTE — ED PROVIDER NOTE - CLINICAL SUMMARY MEDICAL DECISION MAKING FREE TEXT BOX
lumbosacral strain likely with increased muscle tone, UTI noted - will give follow up with PMD. Give macrobid for mild UTI. Punctate stone intrarenal, not likely cause of pain on R.

## 2019-05-26 NOTE — ED PROVIDER NOTE - CARE PLAN
Principal Discharge DX:	Lumbosacral strain, initial encounter  Secondary Diagnosis:	UTI (urinary tract infection)

## 2019-05-26 NOTE — ED ADULT NURSE NOTE - OBJECTIVE STATEMENT
Received patient in triage. AOX4. 62 yo f with PMH of gastritis, AFib on Pradaxa, HTN, HLD, COPD and abdominal hernia presents in ED c/o lower back pain today that woke her up from sleep. Denies any aggravating or relieving factors,  Uses home O2 and Bipap at night.

## 2019-05-26 NOTE — ED PROVIDER NOTE - PSH
H/O cardiac catheterization  in 2012 in [facility]- denies intervention

## 2019-05-26 NOTE — ED PROVIDER NOTE - OBJECTIVE STATEMENT
64 year old female with PMH of asthma, afib, COPD, HLD, HTN otherwise presenting to ED due to R lower back pain for past 2-3 days not improved thus far on muscle relaxant and NSAID. Pt states was doing laundry at home prior to onset of pain. denies any other trauma. Complaining of mild burning on urination

## 2019-05-26 NOTE — ED ADULT NURSE NOTE - NSIMPLEMENTINTERV_GEN_ALL_ED
Implemented All Universal Safety Interventions:  [place] to call system. Call bell, personal items and telephone within reach. Instruct patient to call for assistance. Room bathroom lighting operational. Non-slip footwear when patient is off stretcher. Physically safe environment: no spills, clutter or unnecessary equipment. Stretcher in lowest position, wheels locked, appropriate side rails in place.

## 2019-06-04 ENCOUNTER — RX RENEWAL (OUTPATIENT)
Age: 64
End: 2019-06-04

## 2019-06-16 ENCOUNTER — EMERGENCY (EMERGENCY)
Facility: HOSPITAL | Age: 64
LOS: 0 days | Discharge: HOME | End: 2019-06-16
Attending: EMERGENCY MEDICINE
Payer: MEDICARE

## 2019-06-16 VITALS
WEIGHT: 182.98 LBS | OXYGEN SATURATION: 95 % | SYSTOLIC BLOOD PRESSURE: 128 MMHG | HEART RATE: 82 BPM | TEMPERATURE: 98 F | DIASTOLIC BLOOD PRESSURE: 61 MMHG | RESPIRATION RATE: 17 BRPM

## 2019-06-16 VITALS
SYSTOLIC BLOOD PRESSURE: 121 MMHG | TEMPERATURE: 98 F | DIASTOLIC BLOOD PRESSURE: 86 MMHG | RESPIRATION RATE: 15 BRPM | OXYGEN SATURATION: 95 % | HEART RATE: 74 BPM

## 2019-06-16 DIAGNOSIS — I48.91 UNSPECIFIED ATRIAL FIBRILLATION: ICD-10-CM

## 2019-06-16 DIAGNOSIS — R09.82 POSTNASAL DRIP: ICD-10-CM

## 2019-06-16 DIAGNOSIS — R05 COUGH: ICD-10-CM

## 2019-06-16 DIAGNOSIS — J45.909 UNSPECIFIED ASTHMA, UNCOMPLICATED: ICD-10-CM

## 2019-06-16 DIAGNOSIS — I10 ESSENTIAL (PRIMARY) HYPERTENSION: ICD-10-CM

## 2019-06-16 LAB
ALBUMIN SERPL ELPH-MCNC: 4 G/DL — SIGNIFICANT CHANGE UP (ref 3.3–5)
ALP SERPL-CCNC: 49 U/L — SIGNIFICANT CHANGE UP (ref 40–120)
ALT FLD-CCNC: 21 U/L — SIGNIFICANT CHANGE UP (ref 12–78)
ANION GAP SERPL CALC-SCNC: 6 MMOL/L — SIGNIFICANT CHANGE UP (ref 5–17)
AST SERPL-CCNC: 13 U/L — LOW (ref 15–37)
BASOPHILS # BLD AUTO: 0.02 K/UL — SIGNIFICANT CHANGE UP (ref 0–0.2)
BASOPHILS NFR BLD AUTO: 0.3 % — SIGNIFICANT CHANGE UP (ref 0–2)
BILIRUB SERPL-MCNC: 0.8 MG/DL — SIGNIFICANT CHANGE UP (ref 0.2–1.2)
BUN SERPL-MCNC: 11 MG/DL — SIGNIFICANT CHANGE UP (ref 7–23)
CALCIUM SERPL-MCNC: 8.6 MG/DL — SIGNIFICANT CHANGE UP (ref 8.5–10.1)
CHLORIDE SERPL-SCNC: 114 MMOL/L — HIGH (ref 96–108)
CO2 SERPL-SCNC: 29 MMOL/L — SIGNIFICANT CHANGE UP (ref 22–31)
CREAT SERPL-MCNC: 0.66 MG/DL — SIGNIFICANT CHANGE UP (ref 0.5–1.3)
EOSINOPHIL # BLD AUTO: 0.1 K/UL — SIGNIFICANT CHANGE UP (ref 0–0.5)
EOSINOPHIL NFR BLD AUTO: 1.6 % — SIGNIFICANT CHANGE UP (ref 0–6)
GLUCOSE SERPL-MCNC: 88 MG/DL — SIGNIFICANT CHANGE UP (ref 70–99)
HCT VFR BLD CALC: 38.4 % — SIGNIFICANT CHANGE UP (ref 34.5–45)
HGB BLD-MCNC: 13 G/DL — SIGNIFICANT CHANGE UP (ref 11.5–15.5)
IMM GRANULOCYTES NFR BLD AUTO: 0.2 % — SIGNIFICANT CHANGE UP (ref 0–1.5)
LYMPHOCYTES # BLD AUTO: 2.01 K/UL — SIGNIFICANT CHANGE UP (ref 1–3.3)
LYMPHOCYTES # BLD AUTO: 32.3 % — SIGNIFICANT CHANGE UP (ref 13–44)
MCHC RBC-ENTMCNC: 30.6 PG — SIGNIFICANT CHANGE UP (ref 27–34)
MCHC RBC-ENTMCNC: 33.9 GM/DL — SIGNIFICANT CHANGE UP (ref 32–36)
MCV RBC AUTO: 90.4 FL — SIGNIFICANT CHANGE UP (ref 80–100)
MONOCYTES # BLD AUTO: 0.34 K/UL — SIGNIFICANT CHANGE UP (ref 0–0.9)
MONOCYTES NFR BLD AUTO: 5.5 % — SIGNIFICANT CHANGE UP (ref 2–14)
NEUTROPHILS # BLD AUTO: 3.75 K/UL — SIGNIFICANT CHANGE UP (ref 1.8–7.4)
NEUTROPHILS NFR BLD AUTO: 60.1 % — SIGNIFICANT CHANGE UP (ref 43–77)
NRBC # BLD: 0 /100 WBCS — SIGNIFICANT CHANGE UP (ref 0–0)
PLATELET # BLD AUTO: 208 K/UL — SIGNIFICANT CHANGE UP (ref 150–400)
POTASSIUM SERPL-MCNC: 3.8 MMOL/L — SIGNIFICANT CHANGE UP (ref 3.5–5.3)
POTASSIUM SERPL-SCNC: 3.8 MMOL/L — SIGNIFICANT CHANGE UP (ref 3.5–5.3)
PROT SERPL-MCNC: 7.5 GM/DL — SIGNIFICANT CHANGE UP (ref 6–8.3)
RBC # BLD: 4.25 M/UL — SIGNIFICANT CHANGE UP (ref 3.8–5.2)
RBC # FLD: 11.9 % — SIGNIFICANT CHANGE UP (ref 10.3–14.5)
SODIUM SERPL-SCNC: 149 MMOL/L — HIGH (ref 135–145)
WBC # BLD: 6.23 K/UL — SIGNIFICANT CHANGE UP (ref 3.8–10.5)
WBC # FLD AUTO: 6.23 K/UL — SIGNIFICANT CHANGE UP (ref 3.8–10.5)

## 2019-06-16 PROCEDURE — 99284 EMERGENCY DEPT VISIT MOD MDM: CPT

## 2019-06-16 PROCEDURE — 71046 X-RAY EXAM CHEST 2 VIEWS: CPT | Mod: 26

## 2019-06-16 RX ORDER — FLUTICASONE PROPIONATE 50 MCG
1 SPRAY, SUSPENSION NASAL
Qty: 1 | Refills: 0
Start: 2019-06-16 | End: 2019-07-15

## 2019-06-16 NOTE — ED PROVIDER NOTE - PHYSICAL EXAMINATION
Gen: Alert, Well appearing. NAD    Head: NC, AT, PERRL, normal lids/conjunctiva   ENT: Bilateral TM WNL, patent oropharynx without erythema/exudate, uvula midline, ++ nasal congestion  Neck: supple, no tenderness/meningismus  Pulm: Bilateral clear BS, normal resp effort  CV: RRR, no M/R/G, +dist pulses   Abd: soft, NT/ND, +BS, no guarding/rebound tenderness  Skin: no rash, no bruising  Neuro: AAOx3, no sensory/motor deficits, CN 2-12 intact

## 2019-06-16 NOTE — ED PROVIDER NOTE - OBJECTIVE STATEMENT
66yo female with pmh HTN, afib on pradaxa, asthma/COPD (has O2 take but does not use often), 64yo female with pmh HTN, afib on pradaxa, asthma/COPD (has O2 take but does not use often), presents with cough with white sputum x 1 month and nasal congestion. no cp, sob, fever. pt seen by UC and given azithr 5 days ago but no resolution.     ROS: No fever/chills. No photophobia/eye pain/changes in vision, No ear pain/sore throat/dysphagia, No chest pain/palpitations. No SOB/+cough. No abdominal pain, No N/V/D, no black/bloody bm. No dysuria/frequency/discharge, No headache. No Dizziness.  No rash.  No numbness/tingling/weakness.

## 2019-06-16 NOTE — ED ADULT NURSE NOTE - OBJECTIVE STATEMENT
64 year old female to ed with productive cough close to a month whitish phlegm. Stop smoking 20 years ago. She has a history of COPD, Sleep Apnea, Atrial Fibrillation, Asthma , HTN, and high cholesterol. All are manage with medications. She also has dizziness at intervals. She is on antibiotics and has one pill left. She feels the same. She is on antibiotics for Sinus infections. She uses oxygen at home as necessary. cough presents, she is able to speak full sentences

## 2019-06-16 NOTE — ED ADULT TRIAGE NOTE - CHIEF COMPLAINT QUOTE
productive cough for the past month with nasal congestion and dizziness. took antibiotic with no improvement. history of asthma and copd o2 dependent at home

## 2019-06-16 NOTE — ED PROVIDER NOTE - CLINICAL SUMMARY MEDICAL DECISION MAKING FREE TEXT BOX
Patient is questioning the return to work \"unknown\" on [place] paperwork.  She has a couple more therapies left.  Please call her as she is unaware of when she should go back to work, and would like to speak to you.  
pt well appearing, nontoxic, likely pnd will treat with flonase, continue mucinex, fu with pulm.

## 2019-06-24 ENCOUNTER — RX RENEWAL (OUTPATIENT)
Age: 64
End: 2019-06-24

## 2019-06-27 ENCOUNTER — APPOINTMENT (OUTPATIENT)
Dept: CT IMAGING | Facility: HOSPITAL | Age: 64
End: 2019-06-27
Payer: MEDICARE

## 2019-07-01 ENCOUNTER — OUTPATIENT (OUTPATIENT)
Dept: OUTPATIENT SERVICES | Facility: HOSPITAL | Age: 64
LOS: 1 days | End: 2019-07-01
Payer: MEDICARE

## 2019-07-01 PROCEDURE — G9001: CPT

## 2019-07-12 DIAGNOSIS — Z71.89 OTHER SPECIFIED COUNSELING: ICD-10-CM

## 2019-08-16 ENCOUNTER — FORM ENCOUNTER (OUTPATIENT)
Age: 64
End: 2019-08-16

## 2019-08-17 ENCOUNTER — OUTPATIENT (OUTPATIENT)
Dept: OUTPATIENT SERVICES | Facility: HOSPITAL | Age: 64
LOS: 1 days | End: 2019-08-17
Payer: MEDICARE

## 2019-08-17 ENCOUNTER — APPOINTMENT (OUTPATIENT)
Dept: CT IMAGING | Facility: HOSPITAL | Age: 64
End: 2019-08-17

## 2019-08-17 PROCEDURE — 71250 CT THORAX DX C-: CPT | Mod: 26

## 2019-08-17 PROCEDURE — 71250 CT THORAX DX C-: CPT

## 2019-08-20 ENCOUNTER — APPOINTMENT (OUTPATIENT)
Dept: PULMONOLOGY | Facility: CLINIC | Age: 64
End: 2019-08-20
Payer: MEDICARE

## 2019-08-20 VITALS
HEART RATE: 86 BPM | TEMPERATURE: 98.3 F | HEIGHT: 68 IN | WEIGHT: 186.2 LBS | OXYGEN SATURATION: 95 % | SYSTOLIC BLOOD PRESSURE: 116 MMHG | DIASTOLIC BLOOD PRESSURE: 80 MMHG | BODY MASS INDEX: 28.22 KG/M2

## 2019-08-20 PROCEDURE — 94729 DIFFUSING CAPACITY: CPT

## 2019-08-20 PROCEDURE — 99215 OFFICE O/P EST HI 40 MIN: CPT | Mod: 25

## 2019-08-20 PROCEDURE — 94010 BREATHING CAPACITY TEST: CPT

## 2019-08-21 NOTE — PHYSICAL EXAM
[General Appearance - Well Developed] : well developed [Normal Appearance] : normal appearance [Well Groomed] : well groomed [General Appearance - Well Nourished] : well nourished [No Deformities] : no deformities [Normal Conjunctiva] : the conjunctiva exhibited no abnormalities [General Appearance - In No Acute Distress] : no acute distress [IV] : IV [Neck Appearance] : the appearance of the neck was normal [Heart Rate And Rhythm] : heart rate and rhythm were normal [Heart Sounds] : normal S1 and S2 [Murmurs] : no murmurs present [Arterial Pulses Normal] : the arterial pulses were normal [Respiration, Rhythm And Depth] : normal respiratory rhythm and effort [Lungs Percussion] : the lungs were normal to percussion [Exaggerated Use Of Accessory Muscles For Inspiration] : no accessory muscle use [Abdomen Soft] : soft [Abdomen Tenderness] : non-tender [Abnormal Walk] : normal gait [Gait - Sufficient For Exercise Testing] : the gait was sufficient for exercise testing [Nail Clubbing] : no clubbing of the fingernails [Cyanosis, Localized] : no localized cyanosis [] : no rash [Skin Color & Pigmentation] : normal skin color and pigmentation [No Focal Deficits] : no focal deficits [Oriented To Time, Place, And Person] : oriented to person, place, and time [Impaired Insight] : insight and judgment were intact [Affect] : the affect was normal [Mood] : the mood was normal [Jugular Venous Distention Increased] : there was no jugular-venous distention [Memory Recent] : recent memory was not impaired [FreeTextEntry1] : no chest wall deformities [FreeTextEntry2] : no pedal edema

## 2019-08-21 NOTE — ASSESSMENT
[FreeTextEntry1] : 8-20-19\par It was a pleasure to see Marce in follow-up today. Her respiratory issues are summarized:\par \par 1.   Moderately obstructive airways disease (GOLD stage C- high risk because of exacerbations).    \par Her alpha 1 antitrypsin levels are normal.  This was done since she has extensive emphysema B/L in both upper and lower lobes. She is using Breo and Incruse due to her reduced inspiratory flows.   Additionally, since she was having frequent exacerbations of COPD in the past, she was started on Daliresp, which has reduced the frequency of AECOPD. She has not had a COPD exacerbation or hospitalization in the last year. \par There is a severe obstructive defect on spirometry today and her diffusion capacity is severely reduced. Diffuse emphysematous changes visible on CT chest.\par She has been evaluated by Dr. Jose Arzate at the [facility]. We have again requested her to follow-up with him. She was given the phone number to call to schedule a follow-up appointment. \par \par 2.   Pulmonary hypertension and hypoxemic respiratory failure\par Marce falls in the 10% of patients with COPD who develop pulmonary hypertension.  She tried Adcirca in the past but was unable to tolerate it due to headaches. She is currently on sildenafil 20 mg in the AM, 20 mg in the afternoon, and 40 mg HS. Her blood pressure today is stable at 116/80. \par If her echo still demonstrates increased pressure and if she desaturates on 6MWT, we will consider increasing her sildenafil dosage. At her last visit, we requested that she repeat an echo to reassess her PASP; it has not yet been done but is scheduled for 8/23/19. Her diffusion capacity is severely reduced at 38% of predicted but stable as compared to her prior test. I would have like to have gotten a 6MWT today to assess for any improvement on the increased dosage of sildenafil but she declined today. She has agreed to perform 6MWT at her next visit. If on her echo her PASP is still elevated, and if she desaturates on her next 6MWT, we will consider further increasing her sildenafil dosage. She was encouraged to exercise as she was taught at pulmonary rehab.   \par \par 3.   GEO\par Marce has GEO and is using CPAP every night.   No compliance issues or complaints were noted. \par \par 4.   Pulmonary nodules \par I have reviewed her CT chest from 8/17/19. The micro-nodules appear stable and do not require further follow-up.  \par 5. Allergic rhinitis\amando Zheng has significant inflammation of the nasal mucosa on exam today. She has Flonase but it has not been working for her. We have recommended that she use budesonide 0.5 mg/ampule to be used in a saline sinus lavage BID, as well as azelastine nasal spray. \par \par 6. Health maintenance\amando Zheng was administered Prevnar (13 valent) in May 2019. She will be due for Pneumovax in 05/2020.\par \par RTC:  3 months \par -Pt agreed to perform 6MWT at her next visit.

## 2019-08-21 NOTE — DISCUSSION/SUMMARY
[FreeTextEntry1] : ATTENDING'S Pertinent Exam:\par 8-20-19\par -nasal mucosa inflamed\par -no JVD at 45 degrees, no hepatojugular reflux \par -diminished breath sounds at both bases, no wheezing or rhonchi \par -normal S1, S2, P2 not loud or split -\par -no pedal edema\par -no rash \par \par Spirometry demonstrates severe obstructive lung defect with severe decrease in DLCO, suggesting emphysema.   This is stable since April 2018.

## 2019-08-21 NOTE — REVIEW OF SYSTEMS
[Eye Irritation] : ~T irritation of the eyes [Nasal Congestion] : nasal congestion [Postnasal Drip] : postnasal drip [Sinus Problems] : sinus problems [Dyspnea] : dyspnea [Wheezing] : wheezing [Hypertension] : ~T hypertension [Dysrhythmia] : dysrhythmia [Palpitations] : palpitations [Hay Fever] : hay fever [Itchy Eyes] : itching of ~T the eyes [Watery Eyes] : ~T eyes watering / discharge [Nasal Discharge] : nasal discharge [Arthralgias] : arthralgias [Headache] : headache [As Noted in HPI] : as noted in HPI [Negative] : Pulmonary Hypertension [Fever] : no fever [Fatigue] : no fatigue [Chills] : no chills [Poor Appetite] : normal appetite  [Sore Throat] : no sore throat [Dry Eyes] : no dryness of the eyes [Sputum] : not coughing up ~M sputum [Cough] : no cough [Hemoptysis] : no hemoptysis [Pleuritic Pain] : no pleuritic pain [Chest Tightness] : no chest tightness [Frequent URIs] : no frequent upper respiratory infections [Hives] : no urticaria was observed [Reflux] : no reflux [Heartburn] : no heartburn [Dysphagia] : no dysphagia [Nausea] : no nausea [Nocturia] : no nocturia [Vomiting] : no vomiting [Frequency] : no change in urinary frequency [Urgency] : no feelings of urinary urgency [Dysuria] : no dysuria [Raynaud] : no Raynaud's phenomenon was observed [Scleroderma] : no scleroderma [Rash] : no [unfilled] rash [Clotting Disorder] : no clotting disorder [Anemia] : no anemia [Easy Bruising] : no ~M tendency for easy bruising [Dizziness] : no dizziness [Focal Weakness] : no focal weakness [Head Injury] : no head injury [FreeTextEntry2] : using Refresh drops for dry eyes as per ENT [FreeTextEntry9] : Afib periodically, not frequently [de-identified] : Following seasonal allergies with ENT [FreeTextEntry7] : gastritis [FreeTextEntry6] : B/L knee pain from arthritis [de-identified] : occasional sinus headaches [de-identified] : on CPAP

## 2019-08-21 NOTE — PROCEDURE
[FreeTextEntry1] : Abhinav, DLCO 19:\par FVC: 2.71 L (85%)\par FEV1: 1.26 L (50%)\par FEV1/FVC: 47%\par ARK20-77%: 0.45 L/s (17%)\par DLCO: 8.7 (38%)\par 6MWT: She declined 6MWT today.\par Severe obstructive lung defect. Severely decreased diffusion capacity. \par \par EXAM: CT CHEST  PROCEDURE DATE: 2019 \par COMPARISON: CT chest 2017. \par Lungs and large airways: Again seen is diffuse centrilobular and paraseptal emphysema. \par There has been a decrease in size of previously seen 0.4 mm left upper lobe nodule, now measuring 3 mm (series 4 image 96). \par There is a 4 mm nodule in the superior portion of the left lower lobe (series 4 image 186). \par Pleura: No pleural effusion. \par Mediastinum and hilar regions: No thoracic lymphadenopathy. \par Heart and pericardium: Heart size is normal. No pericardial effusion. Severe coronary artery disease. \par Vessels: Mild calcified plaque aorta. Again seen is enlargement of the main pulmonary artery measuring 3.8 cm. \par Chest wall and lower neck: Normal. \par Upper abdomen: Status post cholecystectomy. Subcentimeter hypodensity in right lobe of liver too small to characterize although likely representing small cyst. \par Bones: Mild degenerative changes. \par IMPRESSION: \par 1. Diffuse emphysematous changes. \par 2. One or two micronodules and nodules (measuring <6mm), stable from 2017. Given risk factors 12 months follow-up low dose CT screening is recommended. \par 3. No change in dilation of the main pulmonary artery which could be a sequela pulmonary hypertension. \par \par Abhinav, DLCO, 6MWT 18:\par FVC: 2.61 L (93%)\par FEV1: 1.22 L (56%)\par FEV1/FVC: 47%\par SCU34-57%: 0.39 L/s (19%)\par DLCO: 7.5 (32%)\par 6MWT: 240 meters, SpO2 start; 95% --> spO2 end: 85%\par Moderately severe obstructive defect. Severely reduced diffusion capacity.  6MWT was aborted at 4 minutes due to desaturation; pt also notes walk distance was limited secondary to knee pain. Reduced walk distance with significant desaturation. \par \par EXAM:  ECHOCARDIOGRAM (CARDIOL) 2018  \par Normal left ventricular size and wall thickness.The left ventricular wall  motion is normal.The left ventricular ejection fraction is normal.The left ventricular ejection fraction is 60%.The right ventricle is normal in size and function.The left atrial size is normal.Right atrial size is normal.Structurally normal aortic valve.No aortic regurgitation noted.Mildly calcified anterior leaflet of the mitral valve.There is mild to moderate mitral regurgitation.Structurally normal tricuspid valve.There is trace to mild tricuspid regurgitation.There is mild pulmonary hypertension.The pulmonary artery systolic pressure is estimated to be 40 mmHg.Structurally normal pulmonic valve.There is trace pulmonic regurgitation.There is no\par  pericardial effusion.There is no significant change when compared to the echo from 2017.\par \par Spirometry\par FVC: 2.83 100%\par FEV1: 1.19 54%\par FEV1/FVC: 42%\par DLCO: 31%\par \par AAT: WNL (117)\par \par Echo: 17: EF 55-60%, mild PHTN with PASP 40mmHg\par \par Spirometry and DLCO 17:\par FVC: 2.71 L (95% pred) --> 2.58 L (90% pred)\par FEV1: 1.20 L (54% pred) --> 1.14 L (51% pred)\par FEV1/FVC: 44% --> 44%\par IKC77-99%: 0.30 L/s (14% pred) --> 0.35 L/s (16% pred)\par DLCO: 8.0 (32% pred)\par PI max 17: [id number] (55% pred)\par 6MWT: 382m, SpO2 start: 95% --> SpO2 end: 88%\par Severe obstruction. Severely reduced diffusion capacity. Vastly improved walk distance (about 300m more than last) and improvement in degree of desaturation (but still with significant desaturation). \par \par PFT 3/28/17:\par FVC: 2.39 L (83% pred) --> 2.22 L (77% pred)\par FEV1: 1.14 L (50% pred) --> 1.09 L (48% pred)\par FEV1/FVC: 48% --> 49%\par BZF86-82%: 0.42 L/s (19% pred) --> 0.40 L/s (18% pred)\par T.84 L (92% pred)\par RV/T%\par DLCO: 8.1 (33% pred)\par 6MWT 3/28/17: Pt declined due to pain from heel spur\par Severe obstructive defect. Normal lung volumes. Very severely reduced DLCO.  Air trapping. \par \par Abhinav with bronchodilator 16:\par FVC: 2.34 L (83% pred) --> 2.67 L (94% pred)\par FEV1: 1.07 L (48% pred) --> 1.24 L (56% pred)\par FEV1/FVC: 46% --> 46%\par FFB56-18%: 0.38 L/s (18% pred) --> 0.41 L/s (19% pred)\par 330cc increase in FVC post-bronchodilator. Moderate obstruction. \par \par CXR 16: pt somewhat rotated, haziness in the LLL which is also seen on the lateral film, cannot rule out pneumonia of the LLL\par \par Spirometry 16:\par FVC: 2.71 L (94% pred)\par FEv1: 1.23 L (55% pred)\par FEV1/FVC: 46%\par FUZ68-01%: 0.42 L/s (19% pred)\par DLCO 16: 9.3 (33% pred <-- 29%)\par 6MWT 16: Walk time: 2:00, 86 meters, SpO2 start: 89% --> SpO2 end: 82%\par Improvement in FEV1 and FVC as compared to prior test.  Severe obstructive lung disease. Severely reduced diffusion capacity. Reduced walk distance but test aborted early due to desaturation.  Degree of desaturation improved since previous test. \par \par PFTs 16\par FVC: 1.71 (59%)\par FEV1: 0.98 (44%)\par FEV1/FVC 58%\par FEF25/75: 0.59 (27%)\par T.38 (83%)\par DLCO: 7.3 (29%)\par Severe obstructive lung disease with severe reduced DLCO. No significant bronchodilator response\par \par PFTs 16\par FVC: 2.57 89%\par FEV1: 1.19 52%\par FEV1/FVC: 46%\par DLCO 37%\par 6MWT: 86 meters, Baseline: 88% End: 74%\par \par PFTs 12/17/15\par FVC: 2.73 (94% of pred)\par FEV1: 1.31 l (57% of pred)\par FEV1/FVC: 48%\par ZDM38-99%: 0.36 L/s (59% of pred)\par %\par DLCO 43%\par Pt declining 6MWT today \par \par Spirometry:\par FVC: 2.46 L (86% of pred)\par FEV1: 1.26 l (56% of pred)\par FEV1/FVC: 51.2%\par SXP08-58%: 0.64 L/s (29% of pred)\par C/w moderate obstructive pulmonary disease.\par \par RHC 10/13/15:\par -Pulmonary capillary occlusion pressure: 17 mm of Hg\par -Pulmonary artery systolic: 40 mmHg\par -Pulmonary artery diastolic: 20mm Hg\par -Mean pulmonary artery pressure: 29 mm Hg\par -Right atrial pressure: 12mm Hg\par -Cardiac output was 4.43\par Based upon these numbers, the pulmonary artery diastolic and the wedge gradient is normal at 3. The trans-pulmonary pressure gradient is mildly elevated at 12. With a cardiac output of 4.43 L per minute, the pulmonary vascular resistance is normal at slightly less than 3 Woods units.  There is no increase in intrinsic pulmonary vascular resistance. The pulmonary artery pressures are also not elevated out of proportion to the left-sided pressures therefore the patient is not a candidate for advanced vasodilator therapy.\par \par TTE 12/19/15:\par LVH, nomal LVEF PA sys press 34mmHg, mild MR & TR\par \par CT chest CTAPE 08/10/15\par IMPRESSION: No evidence for pulmonary emboli. No significant change in moderate emphysematous changes. No significant change in main pulmonary arterial dilatation, possibly reflecting sequelae of pulmonary arterial hypertension.

## 2019-08-21 NOTE — HISTORY OF PRESENT ILLNESS
[Worsened] : have worsened [None] : ~He/She~ has no significant interval events [Feelings Of Weakness On Exertion] : stable exercise intolerance [Cough] : denies coughing [Wheezing] : denies wheezing [Regional Soft Tissue Swelling Both Lower Extremities] : denies lower extremity edema [Chest Pain Or Discomfort] : denies chest pain [Fever] : denies fever [Obstructive Sleep Apnea] : obstructive sleep apnea [Difficulty Breathing During Exertion] : stable dyspnea on exertion [FreeTextEntry1] : 65 yo AAF with Asthma/COPD presents for follow-up. Pt with diffuse centrilobular emphysema on triple therapy and daliresp, pulmonary HTN  with PAS 43mmHg on RHC on sildenafil, stable pulmonary nodules, and severe exercise restriction\par \par 8/20/19: \par COPD relatively stable. Had respiratory tract infection, mainly nasal sx, requiring abx and prednisone in May 2019 at urgent care. In last 1 year no hospitalizations or had any COPD exacerbations. On Daliresp.\par She is currently on sildenafil 20 mg in AM, 20 mg in PM, and 40 mg HS. \par Denies cough. \par Has not followed up with [facility] lung transplant program, lost their phone number.

## 2019-09-04 ENCOUNTER — FORM ENCOUNTER (OUTPATIENT)
Age: 64
End: 2019-09-04

## 2019-09-05 ENCOUNTER — OUTPATIENT (OUTPATIENT)
Dept: OUTPATIENT SERVICES | Facility: HOSPITAL | Age: 64
LOS: 1 days | End: 2019-09-05
Payer: MEDICARE

## 2019-09-05 DIAGNOSIS — I27.20 PULMONARY HYPERTENSION, UNSPECIFIED: ICD-10-CM

## 2019-09-05 PROCEDURE — 93306 TTE W/DOPPLER COMPLETE: CPT | Mod: 26

## 2019-09-05 PROCEDURE — 93306 TTE W/DOPPLER COMPLETE: CPT

## 2019-09-09 ENCOUNTER — RESULT REVIEW (OUTPATIENT)
Age: 64
End: 2019-09-09

## 2019-09-18 ENCOUNTER — RX RENEWAL (OUTPATIENT)
Age: 64
End: 2019-09-18

## 2019-11-15 ENCOUNTER — EMERGENCY (EMERGENCY)
Facility: HOSPITAL | Age: 64
LOS: 0 days | Discharge: HOME | End: 2019-11-15
Attending: EMERGENCY MEDICINE
Payer: MEDICARE

## 2019-11-15 VITALS
OXYGEN SATURATION: 93 % | DIASTOLIC BLOOD PRESSURE: 81 MMHG | HEIGHT: 65 IN | RESPIRATION RATE: 16 BRPM | WEIGHT: 173.94 LBS | TEMPERATURE: 99 F | SYSTOLIC BLOOD PRESSURE: 127 MMHG | HEART RATE: 75 BPM

## 2019-11-15 VITALS
TEMPERATURE: 98 F | RESPIRATION RATE: 16 BRPM | HEART RATE: 69 BPM | DIASTOLIC BLOOD PRESSURE: 79 MMHG | SYSTOLIC BLOOD PRESSURE: 127 MMHG | OXYGEN SATURATION: 97 %

## 2019-11-15 DIAGNOSIS — J45.909 UNSPECIFIED ASTHMA, UNCOMPLICATED: ICD-10-CM

## 2019-11-15 DIAGNOSIS — R07.9 CHEST PAIN, UNSPECIFIED: ICD-10-CM

## 2019-11-15 DIAGNOSIS — G47.30 SLEEP APNEA, UNSPECIFIED: ICD-10-CM

## 2019-11-15 DIAGNOSIS — M62.830 MUSCLE SPASM OF BACK: ICD-10-CM

## 2019-11-15 DIAGNOSIS — Z88.8 ALLERGY STATUS TO OTHER DRUGS, MEDICAMENTS AND BIOLOGICAL SUBSTANCES: ICD-10-CM

## 2019-11-15 DIAGNOSIS — J44.9 CHRONIC OBSTRUCTIVE PULMONARY DISEASE, UNSPECIFIED: ICD-10-CM

## 2019-11-15 DIAGNOSIS — M79.622 PAIN IN LEFT UPPER ARM: ICD-10-CM

## 2019-11-15 DIAGNOSIS — I10 ESSENTIAL (PRIMARY) HYPERTENSION: ICD-10-CM

## 2019-11-15 DIAGNOSIS — M54.2 CERVICALGIA: ICD-10-CM

## 2019-11-15 LAB
ALBUMIN SERPL ELPH-MCNC: 4.3 G/DL — SIGNIFICANT CHANGE UP (ref 3.3–5)
ALP SERPL-CCNC: 52 U/L — SIGNIFICANT CHANGE UP (ref 40–120)
ALT FLD-CCNC: 15 U/L — SIGNIFICANT CHANGE UP (ref 12–78)
ANION GAP SERPL CALC-SCNC: 4 MMOL/L — LOW (ref 5–17)
AST SERPL-CCNC: 22 U/L — SIGNIFICANT CHANGE UP (ref 15–37)
BILIRUB SERPL-MCNC: 1.2 MG/DL — SIGNIFICANT CHANGE UP (ref 0.2–1.2)
BUN SERPL-MCNC: 8 MG/DL — SIGNIFICANT CHANGE UP (ref 7–23)
CALCIUM SERPL-MCNC: 8.9 MG/DL — SIGNIFICANT CHANGE UP (ref 8.5–10.1)
CHLORIDE SERPL-SCNC: 111 MMOL/L — HIGH (ref 96–108)
CO2 SERPL-SCNC: 30 MMOL/L — SIGNIFICANT CHANGE UP (ref 22–31)
CREAT SERPL-MCNC: 0.7 MG/DL — SIGNIFICANT CHANGE UP (ref 0.5–1.3)
GLUCOSE SERPL-MCNC: 91 MG/DL — SIGNIFICANT CHANGE UP (ref 70–99)
HCT VFR BLD CALC: 38.2 % — SIGNIFICANT CHANGE UP (ref 34.5–45)
HGB BLD-MCNC: 12.6 G/DL — SIGNIFICANT CHANGE UP (ref 11.5–15.5)
MAGNESIUM SERPL-MCNC: 2.1 MG/DL — SIGNIFICANT CHANGE UP (ref 1.6–2.6)
MCHC RBC-ENTMCNC: 30.5 PG — SIGNIFICANT CHANGE UP (ref 27–34)
MCHC RBC-ENTMCNC: 33 GM/DL — SIGNIFICANT CHANGE UP (ref 32–36)
MCV RBC AUTO: 92.5 FL — SIGNIFICANT CHANGE UP (ref 80–100)
NRBC # BLD: 0 /100 WBCS — SIGNIFICANT CHANGE UP (ref 0–0)
PLATELET # BLD AUTO: 212 K/UL — SIGNIFICANT CHANGE UP (ref 150–400)
POTASSIUM SERPL-MCNC: 3.7 MMOL/L — SIGNIFICANT CHANGE UP (ref 3.5–5.3)
POTASSIUM SERPL-SCNC: 3.7 MMOL/L — SIGNIFICANT CHANGE UP (ref 3.5–5.3)
PROT SERPL-MCNC: 7.8 GM/DL — SIGNIFICANT CHANGE UP (ref 6–8.3)
RBC # BLD: 4.13 M/UL — SIGNIFICANT CHANGE UP (ref 3.8–5.2)
RBC # FLD: 11.6 % — SIGNIFICANT CHANGE UP (ref 10.3–14.5)
SODIUM SERPL-SCNC: 145 MMOL/L — SIGNIFICANT CHANGE UP (ref 135–145)
TROPONIN I SERPL-MCNC: <.015 NG/ML — SIGNIFICANT CHANGE UP (ref 0.01–0.04)
WBC # BLD: 6.05 K/UL — SIGNIFICANT CHANGE UP (ref 3.8–10.5)
WBC # FLD AUTO: 6.05 K/UL — SIGNIFICANT CHANGE UP (ref 3.8–10.5)

## 2019-11-15 PROCEDURE — 71045 X-RAY EXAM CHEST 1 VIEW: CPT | Mod: 26

## 2019-11-15 PROCEDURE — 99285 EMERGENCY DEPT VISIT HI MDM: CPT

## 2019-11-15 PROCEDURE — 93010 ELECTROCARDIOGRAM REPORT: CPT

## 2019-11-15 RX ORDER — KETOROLAC TROMETHAMINE 30 MG/ML
15 SYRINGE (ML) INJECTION ONCE
Refills: 0 | Status: DISCONTINUED | OUTPATIENT
Start: 2019-11-15 | End: 2019-11-15

## 2019-11-15 RX ORDER — DICLOFENAC SODIUM 30 MG/G
2 GEL TOPICAL
Qty: 1 | Refills: 0
Start: 2019-11-15 | End: 2019-11-21

## 2019-11-15 RX ORDER — CYCLOBENZAPRINE HYDROCHLORIDE 10 MG/1
1 TABLET, FILM COATED ORAL
Qty: 15 | Refills: 0
Start: 2019-11-15 | End: 2019-11-19

## 2019-11-15 RX ORDER — CYCLOBENZAPRINE HYDROCHLORIDE 10 MG/1
10 TABLET, FILM COATED ORAL ONCE
Refills: 0 | Status: COMPLETED | OUTPATIENT
Start: 2019-11-15 | End: 2019-11-15

## 2019-11-15 RX ADMIN — CYCLOBENZAPRINE HYDROCHLORIDE 10 MILLIGRAM(S): 10 TABLET, FILM COATED ORAL at 11:51

## 2019-11-15 RX ADMIN — Medication 15 MILLIGRAM(S): at 12:48

## 2019-11-15 NOTE — ED PROVIDER NOTE - CLINICAL SUMMARY MEDICAL DECISION MAKING FREE TEXT BOX
pt well appearing. Lab values do not require emergent intervention. clinically muscle spasm, dc with analgesia.

## 2019-11-15 NOTE — ED PROVIDER NOTE - NSFOLLOWUPINSTRUCTIONS_ED_ALL_ED_FT
Follow up with your primary care doctor within the next 24-48 hours and bring copy of your results.  Return to the Emergency Department for worsening or persistent symptoms or any other concerns incl. chest pain, shortness of breath, dizziness, inability to tolerate oral intake.  Rest, drink plenty of fluids.  Advance activity as tolerated.  Continue all previously prescribed medications as directed. You can use  Tylenol 650 mg every 4 hours for pain/fever.

## 2019-11-15 NOTE — ED ADULT NURSE NOTE - NS ED NOTE ABUSE RESPONSE YN
Please notify patient /parent that radiology agrees pneumonia has improved but there still is a small area of inflammation. Continue nebs as discussed at this time.     Meri Christine PA-C   Yes

## 2019-11-15 NOTE — ED PROVIDER NOTE - PHYSICAL EXAMINATION
Gen: Alert, Well appearing. NAD    Head: NC, AT, PERRL, normal lids/conjunctiva   ENT: Bilateral TM WNL, patent oropharynx without erythema/exudate, uvula midline  Neck: supple, no tenderness/meningismus  Pulm: Bilateral clear BS, normal resp effort  CV: RRR, no M/R/G, +dist pulses   Abd: soft, NT/ND, +BS, no guarding/rebound tenderness  Mskel: extremities x4 with normal ROM. no ctl spine ttp. no edema/erythema/cyanosis, +tender left trapezius and spasm  Skin: no rash, no bruising  Neuro: AAOx3, no sensory/motor deficits, CN 2-12 intact Gen: Alert, Well appearing. NAD    Head: NC, AT, PERRL, normal lids/conjunctiva   ENT: Bilateral TM WNL, patent oropharynx without erythema/exudate, uvula midline  Neck: supple, no tenderness/meningismus  Pulm: Bilateral clear BS, normal resp effort  CV: RRR, no M/R/G, +dist pulses   Abd: soft, NT/ND, +BS, no guarding/rebound tenderness  Mskel: extremities x4 with normal ROM. no ctl spine ttp. no edema/erythema/cyanosis, +tender left trapezius and spasm, from of shoulder but with pain. pulses intact.  Skin: no rash, no bruising  Neuro: AAOx3, no sensory/motor deficits, CN 2-12 intact

## 2019-11-15 NOTE — ED PROVIDER NOTE - OBJECTIVE STATEMENT
Pertinent PMH/PSH/FHx/SHx and Review of Systems contained within:   64 year old female w/PMHx COPD, HLD, HTN, sleep apnea, asthma, Afib on Pradaxa and propafenone, presents to the ED today for left arm pain w/associated left arm numbness x3 days. Pt reports it is a radiating, stabbing pain that has been waking her up at night. Pt denies CP, headache, abdominal pain, N/V/D or palpitations. Pt denies any allergies to medications.      ROS: No fever/chills, No photophobia/eye pain/changes in vision, No ear pain/sore throat/dysphagia, No chest pain/palpitations, +LUE pain/numbness, no SOB/cough/wheeze/stridor, No abdominal pain, No N/V/D, no dysuria/frequency/discharge, No neck/back pain, no rash, no changes in neurological status/function. Pertinent PMH/PSH/FHx/SHx and Review of Systems contained within:   64 year old female w/PMH COPD, HLD, HTN, sleep apnea, asthma, Afib on Pradaxa and propafenone, presents to the ED today for left arm pain w/associated left arm numbness x3 days. Pt reports it is a radiating, stabbing pain that has been waking her up at night. Pt denies CP, headache, abdominal pain, N/V/D or palpitations. Pt denies any allergies to medications.      ROS: No fever/chills, No photophobia/eye pain/changes in vision, No ear pain/sore throat/dysphagia, No chest pain/palpitations, +LUE pain/numbness, no SOB/cough/wheeze/stridor, No abdominal pain, No N/V/D, no dysuria/frequency/discharge, No neck/back pain, no rash, no changes in neurological status/function. Pertinent PMH/PSH/FHx/SHx and Review of Systems contained within:   64 year old female w/PMH COPD, HLD, HTN, sleep apnea, asthma, Afib on Pradaxa and propafenone, presents to the ED today for 2 days of left upper outer am pain, stabbing/tingling with associated neck/trapezius pain.  + similar sciatica/neuropathic pain to rt shoulder in past requiring muscle relaxants.  pt DENIES CP, worsening sob, palpitations, dizziness, headache, abdominal pain, N/V/D or palpitations. Pt denies any allergies to medications.      ROS: No fever/chills, No photophobia/eye pain/changes in vision, No ear pain/sore throat/dysphagia, No chest pain/palpitations, +LUE pain/numbness, no SOB/cough/wheeze/stridor, No abdominal pain, No N/V/D, no dysuria/frequency/discharge, No neck/back pain, no rash, no changes in neurological status/function.

## 2019-11-15 NOTE — ED ADULT NURSE NOTE - NSIMPLEMENTINTERV_GEN_ALL_ED
Implemented All Universal Safety Interventions:  [place] to call system. Call bell, personal items and telephone within reach. Instruct patient to call for assistance. Room bathroom lighting operational. Non-slip footwear when patient is off stretcher. Physically safe environment: no spills, clutter or unnecessary equipment. Stretcher in lowest position, wheels locked, appropriate side rails in place.

## 2019-11-15 NOTE — ED ADULT NURSE NOTE - OBJECTIVE STATEMENT
Pt c.o of intermittent pain x2 days to left and left arm and numbness, Pt denies chest pain or shortness of breath at this time. Ptl H/O chronic obstructive pulmonary disease. PT USES 4L of oxygen nc AT HOME AS NEEDED. PMH SLEEP APNEA, copd, ASTHMA. pt deneis hevay lifting or truama to arm but admitss to mopping with upper extremity use.

## 2019-11-15 NOTE — ED ADULT NURSE NOTE - NS_ED_NURSE_TEACHING_TOPIC_ED_A_ED
Doxycycline Pregnancy And Lactation Text: This medication is Pregnancy Category D and not consider safe during pregnancy. It is also excreted in breast milk but is considered safe for shorter treatment courses. Other specify/pmd

## 2019-11-19 ENCOUNTER — APPOINTMENT (OUTPATIENT)
Dept: PULMONOLOGY | Facility: CLINIC | Age: 64
End: 2019-11-19

## 2019-11-27 ENCOUNTER — RX RENEWAL (OUTPATIENT)
Age: 64
End: 2019-11-27

## 2019-11-27 RX ORDER — AZELASTINE HYDROCHLORIDE 137 UG/1
0.1 SPRAY, METERED NASAL TWICE DAILY
Qty: 1 | Refills: 2 | Status: DISCONTINUED | COMMUNITY
Start: 2019-08-21 | End: 2019-11-27

## 2019-12-10 ENCOUNTER — APPOINTMENT (OUTPATIENT)
Dept: PULMONOLOGY | Facility: CLINIC | Age: 64
End: 2019-12-10

## 2019-12-17 ENCOUNTER — RX RENEWAL (OUTPATIENT)
Age: 64
End: 2019-12-17

## 2019-12-17 ENCOUNTER — APPOINTMENT (OUTPATIENT)
Dept: PULMONOLOGY | Facility: CLINIC | Age: 64
End: 2019-12-17

## 2020-01-28 ENCOUNTER — APPOINTMENT (OUTPATIENT)
Dept: PULMONOLOGY | Facility: CLINIC | Age: 65
End: 2020-01-28

## 2020-01-28 ENCOUNTER — OTHER (OUTPATIENT)
Age: 65
End: 2020-01-28

## 2020-02-28 ENCOUNTER — EMERGENCY (EMERGENCY)
Facility: HOSPITAL | Age: 65
LOS: 0 days | Discharge: HOME | End: 2020-02-28
Attending: EMERGENCY MEDICINE
Payer: MEDICARE

## 2020-02-28 VITALS
HEIGHT: 66 IN | SYSTOLIC BLOOD PRESSURE: 133 MMHG | TEMPERATURE: 99 F | RESPIRATION RATE: 18 BRPM | DIASTOLIC BLOOD PRESSURE: 95 MMHG | HEART RATE: 100 BPM | OXYGEN SATURATION: 92 % | WEIGHT: 181 LBS

## 2020-02-28 VITALS
SYSTOLIC BLOOD PRESSURE: 130 MMHG | DIASTOLIC BLOOD PRESSURE: 80 MMHG | TEMPERATURE: 99 F | RESPIRATION RATE: 18 BRPM | HEART RATE: 80 BPM | OXYGEN SATURATION: 96 %

## 2020-02-28 DIAGNOSIS — J44.0 CHRONIC OBSTRUCTIVE PULMONARY DISEASE WITH (ACUTE) LOWER RESPIRATORY INFECTION: ICD-10-CM

## 2020-02-28 DIAGNOSIS — I10 ESSENTIAL (PRIMARY) HYPERTENSION: ICD-10-CM

## 2020-02-28 DIAGNOSIS — E78.00 PURE HYPERCHOLESTEROLEMIA, UNSPECIFIED: ICD-10-CM

## 2020-02-28 DIAGNOSIS — J20.9 ACUTE BRONCHITIS, UNSPECIFIED: ICD-10-CM

## 2020-02-28 DIAGNOSIS — G47.30 SLEEP APNEA, UNSPECIFIED: ICD-10-CM

## 2020-02-28 DIAGNOSIS — J44.9 CHRONIC OBSTRUCTIVE PULMONARY DISEASE, UNSPECIFIED: ICD-10-CM

## 2020-02-28 DIAGNOSIS — R06.02 SHORTNESS OF BREATH: ICD-10-CM

## 2020-02-28 DIAGNOSIS — I48.91 UNSPECIFIED ATRIAL FIBRILLATION: ICD-10-CM

## 2020-02-28 DIAGNOSIS — K21.9 GASTRO-ESOPHAGEAL REFLUX DISEASE WITHOUT ESOPHAGITIS: ICD-10-CM

## 2020-02-28 LAB
ALBUMIN SERPL ELPH-MCNC: 4 G/DL — SIGNIFICANT CHANGE UP (ref 3.3–5)
ALP SERPL-CCNC: 46 U/L — SIGNIFICANT CHANGE UP (ref 40–120)
ALT FLD-CCNC: 24 U/L — SIGNIFICANT CHANGE UP (ref 12–78)
ANION GAP SERPL CALC-SCNC: 5 MMOL/L — SIGNIFICANT CHANGE UP (ref 5–17)
APTT BLD: 31.8 SEC — SIGNIFICANT CHANGE UP (ref 28.5–37)
AST SERPL-CCNC: 23 U/L — SIGNIFICANT CHANGE UP (ref 15–37)
BASOPHILS # BLD AUTO: 0.01 K/UL — SIGNIFICANT CHANGE UP (ref 0–0.2)
BASOPHILS NFR BLD AUTO: 0.2 % — SIGNIFICANT CHANGE UP (ref 0–2)
BILIRUB SERPL-MCNC: 0.6 MG/DL — SIGNIFICANT CHANGE UP (ref 0.2–1.2)
BUN SERPL-MCNC: 8 MG/DL — SIGNIFICANT CHANGE UP (ref 7–23)
CALCIUM SERPL-MCNC: 8.2 MG/DL — LOW (ref 8.5–10.1)
CHLORIDE SERPL-SCNC: 111 MMOL/L — HIGH (ref 96–108)
CO2 SERPL-SCNC: 27 MMOL/L — SIGNIFICANT CHANGE UP (ref 22–31)
CREAT SERPL-MCNC: 0.74 MG/DL — SIGNIFICANT CHANGE UP (ref 0.5–1.3)
EOSINOPHIL # BLD AUTO: 0.02 K/UL — SIGNIFICANT CHANGE UP (ref 0–0.5)
EOSINOPHIL NFR BLD AUTO: 0.4 % — SIGNIFICANT CHANGE UP (ref 0–6)
GLUCOSE SERPL-MCNC: 92 MG/DL — SIGNIFICANT CHANGE UP (ref 70–99)
HCT VFR BLD CALC: 39.8 % — SIGNIFICANT CHANGE UP (ref 34.5–45)
HGB BLD-MCNC: 12.9 G/DL — SIGNIFICANT CHANGE UP (ref 11.5–15.5)
IMM GRANULOCYTES NFR BLD AUTO: 0.4 % — SIGNIFICANT CHANGE UP (ref 0–1.5)
INR BLD: 1.03 RATIO — SIGNIFICANT CHANGE UP (ref 0.88–1.16)
LACTATE SERPL-SCNC: 2.3 MMOL/L — HIGH (ref 0.7–2)
LYMPHOCYTES # BLD AUTO: 0.99 K/UL — LOW (ref 1–3.3)
LYMPHOCYTES # BLD AUTO: 20.8 % — SIGNIFICANT CHANGE UP (ref 13–44)
MCHC RBC-ENTMCNC: 30.3 PG — SIGNIFICANT CHANGE UP (ref 27–34)
MCHC RBC-ENTMCNC: 32.4 GM/DL — SIGNIFICANT CHANGE UP (ref 32–36)
MCV RBC AUTO: 93.4 FL — SIGNIFICANT CHANGE UP (ref 80–100)
MONOCYTES # BLD AUTO: 0.5 K/UL — SIGNIFICANT CHANGE UP (ref 0–0.9)
MONOCYTES NFR BLD AUTO: 10.5 % — SIGNIFICANT CHANGE UP (ref 2–14)
NEUTROPHILS # BLD AUTO: 3.21 K/UL — SIGNIFICANT CHANGE UP (ref 1.8–7.4)
NEUTROPHILS NFR BLD AUTO: 67.7 % — SIGNIFICANT CHANGE UP (ref 43–77)
NRBC # BLD: 0 /100 WBCS — SIGNIFICANT CHANGE UP (ref 0–0)
PLATELET # BLD AUTO: 172 K/UL — SIGNIFICANT CHANGE UP (ref 150–400)
POTASSIUM SERPL-MCNC: 3.3 MMOL/L — LOW (ref 3.5–5.3)
POTASSIUM SERPL-SCNC: 3.3 MMOL/L — LOW (ref 3.5–5.3)
PROT SERPL-MCNC: 7.4 GM/DL — SIGNIFICANT CHANGE UP (ref 6–8.3)
PROTHROM AB SERPL-ACNC: 11.6 SEC — SIGNIFICANT CHANGE UP (ref 10–12.9)
RBC # BLD: 4.26 M/UL — SIGNIFICANT CHANGE UP (ref 3.8–5.2)
RBC # FLD: 12.6 % — SIGNIFICANT CHANGE UP (ref 10.3–14.5)
SODIUM SERPL-SCNC: 143 MMOL/L — SIGNIFICANT CHANGE UP (ref 135–145)
WBC # BLD: 4.75 K/UL — SIGNIFICANT CHANGE UP (ref 3.8–10.5)
WBC # FLD AUTO: 4.75 K/UL — SIGNIFICANT CHANGE UP (ref 3.8–10.5)

## 2020-02-28 PROCEDURE — 99284 EMERGENCY DEPT VISIT MOD MDM: CPT

## 2020-02-28 PROCEDURE — 71045 X-RAY EXAM CHEST 1 VIEW: CPT | Mod: 26

## 2020-02-28 PROCEDURE — 93010 ELECTROCARDIOGRAM REPORT: CPT

## 2020-02-28 RX ORDER — ALBUTEROL 90 UG/1
3 AEROSOL, METERED ORAL
Qty: 45 | Refills: 0
Start: 2020-02-28 | End: 2020-03-03

## 2020-02-28 RX ORDER — POTASSIUM CHLORIDE 20 MEQ
40 PACKET (EA) ORAL ONCE
Refills: 0 | Status: COMPLETED | OUTPATIENT
Start: 2020-02-28 | End: 2020-02-28

## 2020-02-28 RX ORDER — AZELASTINE 137 UG/1
2 SPRAY, METERED NASAL
Qty: 0 | Refills: 0 | DISCHARGE

## 2020-02-28 RX ORDER — LEVALBUTEROL 1.25 MG/.5ML
1.25 SOLUTION, CONCENTRATE RESPIRATORY (INHALATION) ONCE
Refills: 0 | Status: COMPLETED | OUTPATIENT
Start: 2020-02-28 | End: 2020-02-28

## 2020-02-28 RX ORDER — CEFUROXIME AXETIL 250 MG
1 TABLET ORAL
Qty: 0 | Refills: 0 | DISCHARGE

## 2020-02-28 RX ADMIN — Medication 125 MILLIGRAM(S): at 15:32

## 2020-02-28 RX ADMIN — LEVALBUTEROL 1.25 MILLIGRAM(S): 1.25 SOLUTION, CONCENTRATE RESPIRATORY (INHALATION) at 15:01

## 2020-02-28 RX ADMIN — Medication 40 MILLIEQUIVALENT(S): at 18:16

## 2020-02-28 NOTE — ED PROVIDER NOTE - PATIENT PORTAL LINK FT
You can access the FollowMyHealth Patient Portal offered by [facility] by registering at the following website: [email or web address]. By joining [employer]’s FollowMyHealth portal, you will also be able to view your health information using other applications (apps) compatible with our system.

## 2020-02-28 NOTE — ED ADULT NURSE NOTE - OBJECTIVE STATEMENT
pt received alert and oriented x4, pt c/o cough and asthma exacerbation that has been going on for several days, Pt started on prednisone and Zithromax by her pulmonologist . denies chest pain and fever.

## 2020-02-28 NOTE — ED PROVIDER NOTE - CLINICAL SUMMARY MEDICAL DECISION MAKING FREE TEXT BOX
case discussed with patient and dr. Plascencia; not seen by Dr. plascencia, will discharge, as patient has meds and home oxygen; return instructions provided

## 2020-02-28 NOTE — ED PROVIDER NOTE - OBJECTIVE STATEMENT
63 yo female with history of copd, on prednisone, zithromax for three days as prescribed by urgent care.

## 2020-02-28 NOTE — ED ADULT NURSE NOTE - PSH
H/O cardiac catheterization  in 2012 in [facility]- denies intervention

## 2020-02-28 NOTE — ED ADULT TRIAGE NOTE - CHIEF COMPLAINT QUOTE
Pt complaining of SOb since last night, no relief with pump and nebulizers, pt has flu vaccine, sts she has been having cold symptoms like runny nose

## 2020-03-05 LAB
CULTURE RESULTS: SIGNIFICANT CHANGE UP
CULTURE RESULTS: SIGNIFICANT CHANGE UP
SPECIMEN SOURCE: SIGNIFICANT CHANGE UP
SPECIMEN SOURCE: SIGNIFICANT CHANGE UP

## 2020-04-21 ENCOUNTER — APPOINTMENT (OUTPATIENT)
Dept: PULMONOLOGY | Facility: CLINIC | Age: 65
End: 2020-04-21
Payer: MEDICAID

## 2020-04-21 PROCEDURE — 99441: CPT

## 2020-06-25 ENCOUNTER — APPOINTMENT (OUTPATIENT)
Dept: PULMONOLOGY | Facility: CLINIC | Age: 65
End: 2020-06-25

## 2020-07-01 ENCOUNTER — APPOINTMENT (OUTPATIENT)
Dept: PULMONOLOGY | Facility: CLINIC | Age: 65
End: 2020-07-01

## 2020-07-01 ENCOUNTER — APPOINTMENT (OUTPATIENT)
Dept: PULMONOLOGY | Facility: CLINIC | Age: 65
End: 2020-07-01
Payer: MEDICARE

## 2020-07-01 DIAGNOSIS — J30.9 ALLERGIC RHINITIS, UNSPECIFIED: ICD-10-CM

## 2020-07-01 PROCEDURE — 99442: CPT | Mod: 95

## 2020-07-01 NOTE — HISTORY OF PRESENT ILLNESS
[Other Location: e.g. School (Enter Location, City,State)___] : at [unfilled], at the time of the visit. [Medical Office: ([facility])___] : at the medical office located in  [Verbal consent obtained from patient] : the patient, [unfilled] [TextBox_4] : The patient is a 64 year old female with a history of obstructive sleep apnea. \par 63 yo AAF with Asthma/COPD presents for follow-up. Pt with diffuse centrilobular emphysema on triple therapy and daliresp, pulmonary HTN with PAS 43mmHg on RHC on sildenafil, stable pulmonary nodules, and severe exercise restriction\par \par 7/1/20:\par She feels her breathing is harder when the weather is hot and humid. Also may wheeze and have chest tightness. \par She states her PMD saw her last month and she heard wheezing on exam, gave her a Medrol dose pack.\par She also had a course of abx (she thinks Levaquin) and prednisone in March 2020.\par Xopenex once every other week. Using Breo and Incruse.\par Pulse oximeter generally around 97% when she checks it. Right now SpO2 92% at rest.

## 2020-07-01 NOTE — PHYSICAL EXAM
[No Acute Distress] : no acute distress [No Resp Distress] : no resp distress [Oriented x3] : oriented x3 [Normal Affect] : normal affect [TextBox_2] : speaking clearly and fluidly [TextBox_68] : no wheezing or coughing appreciated with forced expiratory maneuvers, expiratory phase prolonged

## 2020-07-01 NOTE — REVIEW OF SYSTEMS
[Nasal Congestion] : nasal congestion [Sinus Problems] : sinus problems [Chest Tightness] : chest tightness [Wheezing] : wheezing [SOB on Exertion] : sob on exertion [Fever] : no fever [Cough] : no cough

## 2020-07-01 NOTE — ASSESSMENT
[FreeTextEntry1] : 7-1-20\par It was a pleasure to speak with Marce in follow-up today. Her respiratory issues are summarized:\par \par 1.   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  This was done since she has extensive emphysema B/L in both upper and lower lobes. She is using Breo and Incruse due to her reduced inspiratory flows.   Additionally, since she was having frequent exacerbations of COPD in the past, she was started on Daliresp, which has reduced the frequency of AECOPD. She has not had a COPD exacerbation or hospitalization in the last year. \par There is a severe obstructive defect on spirometry at her last visit and her diffusion capacity was severely reduced. Diffuse emphysematous changes were visible on CT chest.\par \par She has been evaluated by Dr. Jose Arzate at the [facility]. We have again requested her to follow-up with him. She was given the phone number again today to call to schedule a follow-up appointment (last appointment with him had been canceled due to COVID-19 pandemic). \par \par She was given a prescription for prednisone and antibiotics in March 2020, she states for asthma-like symptoms. She also saw her PMD last month who gave her a Medrol dose pack for wheezing appreciated on exam, which she has finished. She states she feels better after the last course of oral steroids.\par \par Currently, she states she is only wheezing or tight in the chest when it is hot and humid outside. She is mainly staying in the home. She gets winded walking up one flight of stairs but she feels this is stable as compared to when she was here for her last visit. She is only using Xopenex approximately 2 times per month.\par \par Plan:\par -Continue Breo 200-25 and Incruse, as well as PRN Xopenex\par -Asked her to please call us if she is getting more symptomatic, as she has had 2 courses of oral steroids already this year\par -I will ask our nurse manager to reach out to her to coordinate PFT/6MWT (and covid pre-testing)\par \par 2.   Pulmonary hypertension and hypoxemic respiratory failure\par Marce falls in the 10% of patients with COPD who develop pulmonary hypertension.  She tried Adcirca in the past but was unable to tolerate it due to headaches. She is currently on sildenafil 20 mg in the AM, 20 mg in the afternoon, and 40 mg HS.\par \par She saw Dr. Abbasi recently who did an echo. I asked her to request Dr. Beavers send us a copy of the echo report to see if the right-sided pressures were recorded. \par \par If her echo still demonstrates increased pressure and if she desaturates on 6MWT, we will consider increasing her sildenafil dosage. Her diffusion capacity was severely reduced at 38% of predicted but stable as compared to a prior test. If on her echo her PASP is still elevated, and if she desaturates on her next 6MWT, we will consider further increasing her sildenafil dosage. She was encouraged to exercise as she was taught at pulmonary rehab.\par \par She states when she checks pulse ox at home, it is generally 97%; when she checked while we were on the phone, it was 92% on RA. \par \par Plan:\par -Get echo from Dr. Vasquez's office\par -PFT, 6MWT to be scheduled\par -Asked her to check her SpO2 with exertion, e.g., going up the stairs\par -Determine next steps based on testing results   \par \par 3.   GEO\par Marce has GEO and is using CPAP; she received a new machine.   No compliance issues or complaints were noted. \par \par 4.   Pulmonary nodules \par  The micro-nodules appear stable on 8/17/19 CT and do not require further follow-up.  \par \par 5. Allergic rhinitis\par Marce has been evaluated by her ENT in May, she was given an abx for sinusitis and she was started on Flonase. I advised that she continue to follow-up with her ENT, especially if she continues with congestion or develops new symptoms.\par \par 6. Health maintenance\par Marce was administered Prevnar (13 valent) in May 2019. We can administer Pneumovax at next visit if she is still due.\par \par Return to clinic: August 2020

## 2020-07-01 NOTE — PROCEDURE
[FreeTextEntry1] : Abhinav, DLCO 19:\par FVC: 2.71 L (85%)\par FEV1: 1.26 L (50%)\par FEV1/FVC: 47%\par BMW98-34%: 0.45 L/s (17%)\par DLCO: 8.7 (38%)\par 6MWT: She declined 6MWT today.\par Severe obstructive lung defect. Severely decreased diffusion capacity. \par \par EXAM: CT CHEST  PROCEDURE DATE: 2019 \par COMPARISON: CT chest 2017. \par Lungs and large airways: Again seen is diffuse centrilobular and paraseptal emphysema. \par There has been a decrease in size of previously seen 0.4 mm left upper lobe nodule, now measuring 3 mm (series 4 image 96). \par There is a 4 mm nodule in the superior portion of the left lower lobe (series 4 image 186). \par Pleura: No pleural effusion. \par Mediastinum and hilar regions: No thoracic lymphadenopathy. \par Heart and pericardium: Heart size is normal. No pericardial effusion. Severe coronary artery disease. \par Vessels: Mild calcified plaque aorta. Again seen is enlargement of the main pulmonary artery measuring 3.8 cm. \par Chest wall and lower neck: Normal. \par Upper abdomen: Status post cholecystectomy. Subcentimeter hypodensity in right lobe of liver too small to characterize although likely representing small cyst. \par Bones: Mild degenerative changes. \par IMPRESSION: \par 1. Diffuse emphysematous changes. \par 2. One or two micronodules and nodules (measuring <6mm), stable from 2017. Given risk factors 12 months follow-up low dose CT screening is recommended. \par 3. No change in dilation of the main pulmonary artery which could be a sequela pulmonary hypertension. \par \par Abhinav, DLCO, 6MWT 18:\par FVC: 2.61 L (93%)\par FEV1: 1.22 L (56%)\par FEV1/FVC: 47%\par KAM73-18%: 0.39 L/s (19%)\par DLCO: 7.5 (32%)\par 6MWT: 240 meters, SpO2 start; 95% --> spO2 end: 85%\par Moderately severe obstructive defect. Severely reduced diffusion capacity.  6MWT was aborted at 4 minutes due to desaturation; pt also notes walk distance was limited secondary to knee pain. Reduced walk distance with significant desaturation. \par \par EXAM:  ECHOCARDIOGRAM (CARDIOL) 2018  \par Normal left ventricular size and wall thickness.The left ventricular wall  motion is normal.The left ventricular ejection fraction is normal.The left ventricular ejection fraction is 60%.The right ventricle is normal in size and function.The left atrial size is normal.Right atrial size is normal.Structurally normal aortic valve.No aortic regurgitation noted.Mildly calcified anterior leaflet of the mitral valve.There is mild to moderate mitral regurgitation.Structurally normal tricuspid valve.There is trace to mild tricuspid regurgitation.There is mild pulmonary hypertension.The pulmonary artery systolic pressure is estimated to be 40 mmHg.Structurally normal pulmonic valve.There is trace pulmonic regurgitation.There is no\par  pericardial effusion.There is no significant change when compared to the echo from 2017.\par \par Spirometry\par FVC: 2.83 100%\par FEV1: 1.19 54%\par FEV1/FVC: 42%\par DLCO: 31%\par \par AAT: WNL (117)\par \par Echo: 17: EF 55-60%, mild PHTN with PASP 40mmHg\par \par Spirometry and DLCO 17:\par FVC: 2.71 L (95% pred) --> 2.58 L (90% pred)\par FEV1: 1.20 L (54% pred) --> 1.14 L (51% pred)\par FEV1/FVC: 44% --> 44%\par SUR08-74%: 0.30 L/s (14% pred) --> 0.35 L/s (16% pred)\par DLCO: 8.0 (32% pred)\par PI max 17: [id number] (55% pred)\par 6MWT: 382m, SpO2 start: 95% --> SpO2 end: 88%\par Severe obstruction. Severely reduced diffusion capacity. Vastly improved walk distance (about 300m more than last) and improvement in degree of desaturation (but still with significant desaturation). \par \par PFT 3/28/17:\par FVC: 2.39 L (83% pred) --> 2.22 L (77% pred)\par FEV1: 1.14 L (50% pred) --> 1.09 L (48% pred)\par FEV1/FVC: 48% --> 49%\par HGU22-93%: 0.42 L/s (19% pred) --> 0.40 L/s (18% pred)\par T.84 L (92% pred)\par RV/T%\par DLCO: 8.1 (33% pred)\par 6MWT 3/28/17: Pt declined due to pain from heel spur\par Severe obstructive defect. Normal lung volumes. Very severely reduced DLCO.  Air trapping. \par \par Abhinav with bronchodilator 16:\par FVC: 2.34 L (83% pred) --> 2.67 L (94% pred)\par FEV1: 1.07 L (48% pred) --> 1.24 L (56% pred)\par FEV1/FVC: 46% --> 46%\par QHA84-99%: 0.38 L/s (18% pred) --> 0.41 L/s (19% pred)\par 330cc increase in FVC post-bronchodilator. Moderate obstruction. \par \par CXR 16: pt somewhat rotated, haziness in the LLL which is also seen on the lateral film, cannot rule out pneumonia of the LLL\par \par Spirometry 16:\par FVC: 2.71 L (94% pred)\par FEv1: 1.23 L (55% pred)\par FEV1/FVC: 46%\par MNA76-86%: 0.42 L/s (19% pred)\par DLCO 16: 9.3 (33% pred <-- 29%)\par 6MWT 16: Walk time: 2:00, 86 meters, SpO2 start: 89% --> SpO2 end: 82%\par Improvement in FEV1 and FVC as compared to prior test.  Severe obstructive lung disease. Severely reduced diffusion capacity. Reduced walk distance but test aborted early due to desaturation.  Degree of desaturation improved since previous test. \par \par PFTs 16\par FVC: 1.71 (59%)\par FEV1: 0.98 (44%)\par FEV1/FVC 58%\par FEF25/75: 0.59 (27%)\par T.38 (83%)\par DLCO: 7.3 (29%)\par Severe obstructive lung disease with severe reduced DLCO. No significant bronchodilator response\par \par PFTs 16\par FVC: 2.57 89%\par FEV1: 1.19 52%\par FEV1/FVC: 46%\par DLCO 37%\par 6MWT: 86 meters, Baseline: 88% End: 74%\par \par PFTs 12/17/15\par FVC: 2.73 (94% of pred)\par FEV1: 1.31 l (57% of pred)\par FEV1/FVC: 48%\par PGN21-66%: 0.36 L/s (59% of pred)\par %\par DLCO 43%\par Pt declining 6MWT today \par \par Spirometry:\par FVC: 2.46 L (86% of pred)\par FEV1: 1.26 l (56% of pred)\par FEV1/FVC: 51.2%\par KNE29-86%: 0.64 L/s (29% of pred)\par C/w moderate obstructive pulmonary disease.\par \par RHC 10/13/15:\par -Pulmonary capillary occlusion pressure: 17 mm of Hg\par -Pulmonary artery systolic: 40 mmHg\par -Pulmonary artery diastolic: 20mm Hg\par -Mean pulmonary artery pressure: 29 mm Hg\par -Right atrial pressure: 12mm Hg\par -Cardiac output was 4.43\par Based upon these numbers, the pulmonary artery diastolic and the wedge gradient is normal at 3. The trans-pulmonary pressure gradient is mildly elevated at 12. With a cardiac output of 4.43 L per minute, the pulmonary vascular resistance is normal at slightly less than 3 Woods units.  There is no increase in intrinsic pulmonary vascular resistance. The pulmonary artery pressures are also not elevated out of proportion to the left-sided pressures therefore the patient is not a candidate for advanced vasodilator therapy.\par \par TTE 12/19/15:\par LVH, nomal LVEF PA sys press 34mmHg, mild MR & TR\par \par CT chest CTAPE 08/10/15\par IMPRESSION: No evidence for pulmonary emboli. No significant change in moderate emphysematous changes. No significant change in main pulmonary arterial dilatation, possibly reflecting sequelae of pulmonary arterial hypertension.

## 2020-08-25 ENCOUNTER — APPOINTMENT (OUTPATIENT)
Dept: PULMONOLOGY | Facility: CLINIC | Age: 65
End: 2020-08-25

## 2020-08-25 NOTE — ED ADULT NURSE NOTE - [facility]
Assessment/Plan:  Assessment/Plan   Diagnoses and all orders for this visit:    Rotator cuff strain, right, initial encounter  -     Ambulatory referral to Sports Medicine  -     XR shoulder 2+ vw right; Future  -     Ambulatory referral to Physical Therapy; Future  -     diclofenac sodium (VOLTAREN) 1 %; Apply 2 g topically 4 (four) times a day    Biceps strain, right, initial encounter  -     Ambulatory referral to Physical Therapy; Future  -     diclofenac sodium (VOLTAREN) 1 %; Apply 2 g topically 4 (four) times a day    Subacromial impingement of right shoulder  -     Ambulatory referral to Physical Therapy; Future    Trapezius muscle spasm  -     Ambulatory referral to Physical Therapy; Future  -     cyclobenzaprine (FLEXERIL) 10 mg tablet; Take 1 tablet (10 mg total) by mouth 2 (two) times a day as needed for muscle spasms    Wrist sprain, right, initial encounter  -     Ambulatory referral to Physical Therapy; Future      48-year-old right-hand-dominant female right shoulder pain of more than 1 month duration  Discussed with patient physical exam, radiographs, impression and plan  X-rays of the right shoulder are unremarkable for osseous abnormality  Cervical spine is noted for bilateral paraspinal tenderness  She has normal range of motion  There is positive axial load negative Spurling's  Right shoulder noted for tenderness at trapezius, medial periscapular border, lateral subacromial aspect, and biceps tendon  She has range of motion limited to forward flexion of 160°, abduction 120°, and internal rotation lumbar spine  She has 4+/5 strength external rotation and supraspinatus  There is pain with empty can testing and positive [place] maneuver  She has normal sensation, radial pulse, and biceps reflex in both upper extremities  Clinical impression that she may be symptomatic from strain of rotator cuff and biceps, with trapezius spasm    I discussed treatment regimen of topical anti-inflammatory due to history of gastric ulcer, supplements, physical therapy, muscle relaxers  Supply Voltaren gel topically as needed  She is to start taking tumeric 500 mg twice daily and tart cherry 1000 mg daily, and may take cyclobenzaprine 10 mg at night and may titrate to 2 times a day but not before driving  She is to start physical therapy as soon as possible and do home exercises as directed  She may also benefit from electrical stimulation so I will refer for home electrical stimulation unit  She will follow up me in 6 weeks at which point she will be re-evaluated  Subjective:   Patient ID: Rusty Benavidez is a 36 y o  female  Chief Complaint   Patient presents with    Right Shoulder - Pain       18-year-old right-hand-dominant female presents for evaluation of right shoulder pain of more than 1 month duration  Denies any particular trauma or inciting event, but reports repetitive stress of such as carrying laundry, lifting her dogs and children, and moving the lawn  She had pain described as generalized to the shoulder worse at the anterior and posterior aspects, gradual in onset, achy and throbbing, radiating distally to the upper arm, worse with elevating the arm above shoulder level and reaching behind the back, and improved resting and return to neutral position  She has been taking Tylenol to help with pain without any improvement  Shoulder Pain   This is a new problem  The current episode started more than 1 month ago  The problem occurs daily  The problem has been unchanged  Associated symptoms include arthralgias  Pertinent negatives include no abdominal pain, chest pain, chills, fever, joint swelling, numbness, rash, sore throat or weakness  Exacerbated by: Arm movement  She has tried rest, acetaminophen and position changes for the symptoms  The treatment provided mild relief             The following portions of the patient's history were reviewed and updated as appropriate: She  has a past medical history of Acid reflux disease, Allergic rhinitis, seasonal, Asthma, Hiatal hernia, Migraine, and Ulcer of gastroesophageal junction  She  has a past surgical history that includes [place] tooth extraction and pr egd transoral biopsy single/multiple (N/A, 2/5/2019)  Her family history includes Anxiety disorder in her brother, daughter, mother, and sister; Asthma in her son; Diabetes in her maternal grandmother; Hyperlipidemia in her mother; No Known Problems in her brother, brother, brother, maternal grandfather, paternal grandfather, paternal grandmother, and son  She  reports that she has never smoked  She has never used smokeless tobacco  She reports current alcohol use  She reports that she does not use drugs  She has No Known Allergies       Review of Systems   Constitutional: Negative for chills and fever  HENT: Negative for sore throat  Eyes: Negative for visual disturbance  Respiratory: Negative for shortness of breath  Cardiovascular: Negative for chest pain  Gastrointestinal: Negative for abdominal pain  Genitourinary: Negative for flank pain  Musculoskeletal: Positive for arthralgias  Negative for joint swelling  Skin: Negative for rash and wound  Neurological: Negative for weakness and numbness  Hematological: Does not bruise/bleed easily  Psychiatric/Behavioral: Negative for self-injury  Objective:  Vitals:    08/25/20 1200   BP: 95/63   Pulse: 66   Temp: 98 9 °F (37 2 °C)   Weight: 85 7 kg (189 lb)   Height: 5' 2" (1 575 m)     Back Exam     Reflexes   Biceps: normal    Comments:    Cervical spine  -plan paraspinal tenderness  -normal range home high  -positive axial load  -negative Spurling's      Right Hand Exam     Muscle Strength   The patient has normal right wrist strength  Other   Sensation: normal  Pulse: present      Left Hand Exam     Muscle Strength   The patient has normal left wrist strength      Other   Sensation: normal  Pulse: present      Right Elbow Exam     Range of Motion   The patient has normal right elbow ROM  Muscle Strength   The patient has normal right elbow strength  Other   Sensation: normal      Left Elbow Exam     Other   Sensation: normal      Right Shoulder Exam     Tenderness   The patient is experiencing tenderness in the biceps tendon (Trapeizus, lateral subacromial, coracoid process, medial periscapular border)  Range of Motion   Active abduction: 120   Forward flexion: 160   Internal rotation 0 degrees: Lumbar     Muscle Strength   Right shoulder normal muscle strength: 4+/5 strength external rotation and supraspinatus  Abduction: 5/5   Internal rotation: 5/5     Tests   [place] test: positive (Mild)    Comments:  Negative belly press  Negative push-off  Mild pain with empty can test      Left Shoulder Exam     Muscle Strength   Abduction: 5/5   Internal rotation: 5/5   External rotation: 5/5   Supraspinatus: 5/5           Strength/Myotome Testing     Left Wrist/Hand   Normal wrist strength    Right Wrist/Hand   Normal wrist strength      Physical Exam  Vitals signs and nursing note reviewed  Constitutional:       General: She is not in acute distress  Appearance: She is well-developed  HENT:      Head: Normocephalic  Eyes:      Conjunctiva/sclera: Conjunctivae normal    Neck:      Trachea: No tracheal deviation  Cardiovascular:      Rate and Rhythm: Normal rate  Pulmonary:      Effort: Pulmonary effort is normal  No respiratory distress  Abdominal:      General: There is no distension  Skin:     General: Skin is warm and dry  Neurological:      Mental Status: She is alert and oriented to person, place, and time  Psychiatric:         Behavior: Behavior normal          I have personally reviewed pertinent films in PACS and my interpretation is No osseous abnormality of the right shoulder  (4) excellent

## 2020-09-25 ENCOUNTER — TRANSCRIPTION ENCOUNTER (OUTPATIENT)
Age: 65
End: 2020-09-25

## 2020-09-25 NOTE — REASON FOR VISIT
[Follow-Up] : a follow-up visit [Sleep Apnea] : sleep apnea [COPD] : COPD [Pulmonary Hypertension] : pulmonary hypertension [Pulmonary Nodules] : pulmonary nodules

## 2020-09-29 ENCOUNTER — APPOINTMENT (OUTPATIENT)
Dept: PULMONOLOGY | Facility: CLINIC | Age: 65
End: 2020-09-29
Payer: MEDICARE

## 2020-09-29 VITALS
WEIGHT: 182 LBS | OXYGEN SATURATION: 92 % | RESPIRATION RATE: 12 BRPM | SYSTOLIC BLOOD PRESSURE: 110 MMHG | HEART RATE: 80 BPM | HEIGHT: 68 IN | DIASTOLIC BLOOD PRESSURE: 80 MMHG | BODY MASS INDEX: 27.58 KG/M2 | TEMPERATURE: 98.7 F

## 2020-09-29 PROCEDURE — 76604 US EXAM CHEST: CPT

## 2020-09-29 PROCEDURE — 36415 COLL VENOUS BLD VENIPUNCTURE: CPT

## 2020-09-29 PROCEDURE — 99215 OFFICE O/P EST HI 40 MIN: CPT | Mod: 25

## 2020-09-30 LAB
APTT 2H P 1:4 NP PPP: 38.7 SEC
APTT 2H P INC PPP: 40.2 SEC
APTT 50/50 MIX COMMENT: NORMAL
APTT IMM NP/PRE NP PPP: 37.4 SEC
APTT INV RATIO PPP: 42.5 SEC
BASOPHILS # BLD AUTO: 0.02 K/UL
BASOPHILS NFR BLD AUTO: 0.3 %
C3 SERPL-MCNC: 99 MG/DL
C4 SERPL-MCNC: 13 MG/DL
CCP AB SER IA-ACNC: <8 UNITS
CH50 SERPL-MCNC: 55 U/ML
CONFIRM: 48.6 SEC
CRP SERPL-MCNC: <0.1 MG/DL
DEPRECATED D DIMER PPP IA-ACNC: <150 NG/ML DDU
DRVVT 1H NP PPP: 48.3 SEC
DRVVT IMM 1:2 NP PPP: NORMAL
DRVVT SCREEN TO CONFIRM RATIO: 1.07 RATIO
DSDNA AB SER-ACNC: 83 IU/ML
ENA RNP AB SER IA-ACNC: <0.2 AL
ENA SM AB SER IA-ACNC: <0.2 AL
EOSINOPHIL # BLD AUTO: 0.09 K/UL
EOSINOPHIL NFR BLD AUTO: 1.3 %
ERYTHROCYTE [SEDIMENTATION RATE] IN BLOOD BY WESTERGREN METHOD: 2 MM/HR
HCT VFR BLD CALC: 42.3 %
HGB BLD-MCNC: 13.4 G/DL
IMM GRANULOCYTES NFR BLD AUTO: 0.1 %
LYMPHOCYTES # BLD AUTO: 2.37 K/UL
LYMPHOCYTES NFR BLD AUTO: 35.5 %
MAN DIFF?: NORMAL
MCHC RBC-ENTMCNC: 30.9 PG
MCHC RBC-ENTMCNC: 31.7 GM/DL
MCV RBC AUTO: 97.5 FL
MONOCYTES # BLD AUTO: 0.42 K/UL
MONOCYTES NFR BLD AUTO: 6.3 %
NEUTROPHILS # BLD AUTO: 3.77 K/UL
NEUTROPHILS NFR BLD AUTO: 56.5 %
NPP NORMAL POOLED PLASMA: 33.2 SECS
NT-PROBNP SERPL-MCNC: 20 PG/ML
PLATELET # BLD AUTO: 204 K/UL
RBC # BLD: 4.34 M/UL
RBC # FLD: 12.3 %
RF+CCP IGG SER-IMP: NEGATIVE
RHEUMATOID FACT SER QL: <10 IU/ML
SCREEN DRVVT: 57.6 SEC
SILICA CLOTTING TIME INTERPRETATION: NORMAL
SILICA CLOTTING TIME S/C: 1.07 RATIO
WBC # FLD AUTO: 6.68 K/UL

## 2020-10-01 LAB — ANA SER IF-ACNC: NEGATIVE

## 2020-10-01 NOTE — PROCEDURE
[FreeTextEntry1] : Ultrasound 20\par - B-lines observed curtain sign noted. No evidence of pleural effusion. Diaphragmatic movement normal. \par \par Cardiac cath 20\par PA pressures 38/16 with mean 26. Pulmonary capillary occlusion pressure 14.0 Cardiac output 5.8. Diastolic gradient 2.0. Transpulmonary pressure gradient 12.0 (both normal). PVR (12/5.8) 2.0.\par \par \par [place], DLCO 19:\par FVC: 2.71 L (85%)\par FEV1: 1.26 L (50%)\par FEV1/FVC: 47%\par PNN69-04%: 0.45 L/s (17%)\par DLCO: 8.7 (38%)\par 6MWT: She declined 6MWT today.\par Severe obstructive lung defect. Severely decreased diffusion capacity. \par \par EXAM: CT CHEST  PROCEDURE DATE: 2019 \par COMPARISON: CT chest 2017. \par Lungs and large airways: Again seen is diffuse centrilobular and paraseptal emphysema. \par There has been a decrease in size of previously seen 0.4 mm left upper lobe nodule, now measuring 3 mm (series 4 image 96). \par There is a 4 mm nodule in the superior portion of the left lower lobe (series 4 image 186). \par Pleura: No pleural effusion. \par Mediastinum and hilar regions: No thoracic lymphadenopathy. \par Heart and pericardium: Heart size is normal. No pericardial effusion. Severe coronary artery disease. \par Vessels: Mild calcified plaque aorta. Again seen is enlargement of the main pulmonary artery measuring 3.8 cm. \par Chest wall and lower neck: Normal. \par Upper abdomen: Status post cholecystectomy. Subcentimeter hypodensity in right lobe of liver too small to characterize although likely representing small cyst. \par Bones: Mild degenerative changes. \par IMPRESSION: \par 1. Diffuse emphysematous changes. \par 2. One or two micronodules and nodules (measuring <6mm), stable from 2017. Given risk factors 12 months follow-up low dose CT screening is recommended. \par 3. No change in dilation of the main pulmonary artery which could be a sequela pulmonary hypertension. \par \par [place], DLCO, 6MWT 18:\par FVC: 2.61 L (93%)\par FEV1: 1.22 L (56%)\par FEV1/FVC: 47%\par UYZ16-36%: 0.39 L/s (19%)\par DLCO: 7.5 (32%)\par 6MWT: 240 meters, SpO2 start; 95% --> spO2 end: 85%\par Moderately severe obstructive defect. Severely reduced diffusion capacity.  6MWT was aborted at 4 minutes due to desaturation; pt also notes walk distance was limited secondary to knee pain. Reduced walk distance with significant desaturation. \par \par EXAM:  ECHOCARDIOGRAM (CARDIOL) 2018  \par Normal left ventricular size and wall thickness.The left ventricular wall  motion is normal.The left ventricular ejection fraction is normal.The left ventricular ejection fraction is 60%.The right ventricle is normal in size and function.The left atrial size is normal.Right atrial size is normal.Structurally normal aortic valve.No aortic regurgitation noted.Mildly calcified anterior leaflet of the mitral valve.There is mild to moderate mitral regurgitation.Structurally normal tricuspid valve.There is trace to mild tricuspid regurgitation.There is mild pulmonary hypertension.The pulmonary artery systolic pressure is estimated to be 40 mmHg.Structurally normal pulmonic valve.There is trace pulmonic regurgitation.There is no\par  pericardial effusion.There is no significant change when compared to the echo from 2017.\par \par Spirometry\par FVC: 2.83 100%\par FEV1: 1.19 54%\par FEV1/FVC: 42%\par DLCO: 31%\par \par AAT: WNL (117)\par \par Echo: 17: EF 55-60%, mild PHTN with PASP 40mmHg\par \par Spirometry and DLCO 17:\par FVC: 2.71 L (95% pred) --> 2.58 L (90% pred)\par FEV1: 1.20 L (54% pred) --> 1.14 L (51% pred)\par FEV1/FVC: 44% --> 44%\par BQW48-58%: 0.30 L/s (14% pred) --> 0.35 L/s (16% pred)\par DLCO: 8.0 (32% pred)\par PI max 17: 21ktR5M (55% pred)\par 6MWT: 382m, SpO2 start: 95% --> SpO2 end: 88%\par Severe obstruction. Severely reduced diffusion capacity. Vastly improved walk distance (about 300m more than last) and improvement in degree of desaturation (but still with significant desaturation). \par \par PFT 3/28/17:\par FVC: 2.39 L (83% pred) --> 2.22 L (77% pred)\par FEV1: 1.14 L (50% pred) --> 1.09 L (48% pred)\par FEV1/FVC: 48% --> 49%\par QLL83-85%: 0.42 L/s (19% pred) --> 0.40 L/s (18% pred)\par T.84 L (92% pred)\par RV/T%\par DLCO: 8.1 (33% pred)\par 6MWT 3/28/17: Pt declined due to pain from heel spur\par Severe obstructive defect. Normal lung volumes. Very severely reduced DLCO.  Air trapping. \par \par [place] with bronchodilator 16:\par FVC: 2.34 L (83% pred) --> 2.67 L (94% pred)\par FEV1: 1.07 L (48% pred) --> 1.24 L (56% pred)\par FEV1/FVC: 46% --> 46%\par IOL68-42%: 0.38 L/s (18% pred) --> 0.41 L/s (19% pred)\par 330cc increase in FVC post-bronchodilator. Moderate obstruction. \par \par CXR 16: pt somewhat rotated, haziness in the LLL which is also seen on the lateral film, cannot rule out pneumonia of the LLL\par \par Spirometry 16:\par FVC: 2.71 L (94% pred)\par FEv1: 1.23 L (55% pred)\par FEV1/FVC: 46%\par DBH34-18%: 0.42 L/s (19% pred)\par DLCO 16: 9.3 (33% pred <-- 29%)\par 6MWT 16: Walk time: 2:00, 86 meters, SpO2 start: 89% --> SpO2 end: 82%\par Improvement in FEV1 and FVC as compared to prior test.  Severe obstructive lung disease. Severely reduced diffusion capacity. Reduced walk distance but test aborted early due to desaturation.  Degree of desaturation improved since previous test. \par \par PFTs 16\par FVC: 1.71 (59%)\par FEV1: 0.98 (44%)\par FEV1/FVC 58%\par FEF25/75: 0.59 (27%)\par T.38 (83%)\par DLCO: 7.3 (29%)\par Severe obstructive lung disease with severe reduced DLCO. No significant bronchodilator response\par \par PFTs 16\par FVC: 2.57 89%\par FEV1: 1.19 52%\par FEV1/FVC: 46%\par DLCO 37%\par 6MWT: 86 meters, Baseline: 88% End: 74%\par \par PFTs 12/17/15\par FVC: 2.73 (94% of pred)\par FEV1: 1.31 l (57% of pred)\par FEV1/FVC: 48%\par IGZ27-00%: 0.36 L/s (59% of pred)\par %\par DLCO 43%\par Pt declining 6MWT today \par \par Spirometry:\par FVC: 2.46 L (86% of pred)\par FEV1: 1.26 l (56% of pred)\par FEV1/FVC: 51.2%\par PUD13-70%: 0.64 L/s (29% of pred)\par C/w moderate obstructive pulmonary disease.\par \par RHC 10/13/15:\par -Pulmonary capillary occlusion pressure: 17 mm of Hg\par -Pulmonary artery systolic: 40 mmHg\par -Pulmonary artery diastolic: 20mm Hg\par -Mean pulmonary artery pressure: 29 mm Hg\par -Right atrial pressure: 12mm Hg\par -Cardiac output was 4.43\par Based upon these numbers, the pulmonary artery diastolic and the wedge gradient is normal at 3. The trans-pulmonary pressure gradient is mildly elevated at 12. With a cardiac output of 4.43 L per minute, the pulmonary vascular resistance is normal at slightly less than 3 Woods units.  There is no increase in intrinsic pulmonary vascular resistance. The pulmonary artery pressures are also not elevated out of proportion to the left-sided pressures therefore the patient is not a candidate for advanced vasodilator therapy.\par \par TTE 12/19/15:\par LVH, nomal LVEF PA sys press 34mmHg, mild MR & TR\par \par CT chest CTAPE 08/10/15\par IMPRESSION: No evidence for pulmonary emboli. No significant change in moderate emphysematous changes. No significant change in main pulmonary arterial dilatation, possibly reflecting sequelae of pulmonary arterial hypertension.

## 2020-10-01 NOTE — HISTORY OF PRESENT ILLNESS
[TextBox_4] : The patient is a 65 year old female with a history of obstructive sleep apnea. \par 65 yo AAF with Asthma/COPD presents for follow-up. Pt with diffuse centrilobular emphysema on triple therapy and daliresp, pulmonary HTN with PAS 43mmHg on RHC on sildenafil, stable pulmonary nodules, and severe exercise restriction\par \par 9/29/20:\par Continues on Breo 200-25, Dailiresp and Incruse. Requiring more frequent use of Xopenex the past few weeks, at least 1-2x/day. Feels her asthma is acting up lately with weather changes. Having more chest tightness, wheezing and allergy symptoms (water/itchy eyes). Denies cough. SOB with exertion- can only walk half a block before feeling SOB. Not taking any stairs. Not doing any exercise. Using oxygen during the day on exertion 2 L/min (e.g. house chores). Pulse oximeter generally around 97% when she checks it at home. Right now SpO2 92% at rest. \par Continues on sildenafil (20, 20, 40 mg). Tolerating medication well. \par Had echo with Dr. Abbasi and cardiac cath on 8/24/20. \par On CPAP for GEO & using throughout the night.\par No fevers or chills.  Denies chest pain or palpitations. \par

## 2020-10-01 NOTE — REVIEW OF SYSTEMS
[Nasal Congestion] : nasal congestion [Sinus Problems] : sinus problems [Chest Tightness] : chest tightness [Wheezing] : wheezing [SOB on Exertion] : sob on exertion [Hay Fever] : hay fever [Watery Eyes] : watery eyes [Itchy Eyes] : itchy eyes [Nasal Discharge] : nasal discharge [Arthralgias] : arthralgias [Negative] : Endocrine [Fever] : no fever [Cough] : no cough [Chest Discomfort] : no chest discomfort [Edema] : no edema [Palpitations] : no palpitations [TextBox_94] : bilateral knee pain from arthritis

## 2020-10-01 NOTE — PHYSICAL EXAM
[No Acute Distress] : no acute distress [No Resp Distress] : no resp distress [Oriented x3] : oriented x3 [Normal Affect] : normal affect [Well Nourished] : well nourished [Normal Oropharynx] : normal oropharynx [Normal Appearance] : normal appearance [No Neck Mass] : no neck mass [Normal Rate/Rhythm] : normal rate/rhythm [Normal S1, S2] : normal s1, s2 [No Murmurs] : no murmurs [Clear to Auscultation Bilaterally] : clear to auscultation bilaterally [No Abnormalities] : no abnormalities [Benign] : benign [Normal Gait] : normal gait [No Clubbing] : no clubbing [No Cyanosis] : no cyanosis [No Edema] : no edema [FROM] : FROM [Normal Color/ Pigmentation] : normal color/ pigmentation [No Rash] : no rash [No Focal Deficits] : no focal deficits [TextBox_11] : no pallor, icterus. Significant injection of the conjunctive especially in medial cavus in the right eye [TextBox_44] : no cervical adenopathy  [TextBox_54] :  normal S1, S2, no murmurs, rubs or gallops.  [TextBox_68] : no dullness to percussion, adequate air entry bilaterally, no wheezing, rhonchi or crackles \par no dullness to percussion, adequate air entry bilaterally, no wheezing, rhonchi or crackles \par no dullness to percussion, adequate air entry bilaterally, no wheezing, rhonchi or crackles  [TextBox_105] : no pitting edema [TextBox_125] : no palmar erythema

## 2020-10-01 NOTE — ASSESSMENT
[FreeTextEntry1] : 9-29-20\par It was a pleasure to speak with Marce in follow-up today. Her respiratory issues are summarized:\par \par 1.   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  This was done since she has extensive emphysema B/L in both upper and lower lobes. She is using Breo and Incruse due to her reduced inspiratory flows.   Additionally, since she was having frequent exacerbations of COPD in the past, she was started on Daliresp, which has reduced the frequency of AECOPD. She has not had a COPD exacerbation or hospitalization in the last year. \par There is a severe obstructive defect on spirometry at her last visit and her diffusion capacity was severely reduced. Diffuse emphysematous changes were visible on CT chest.\par \par She has been evaluated by Dr. Jose Arzate at the [facility]. We have again requested her to follow-up with him. She was given the phone number again today to call to schedule a follow-up appointment (last appointment with him had been canceled due to COVID-19 pandemic). \par \par Currently, she is having more chest tightness and wheezing requiring more frequent and regular use of Xopenex (1-2x per day). Continues on Breo 200-25, Dailiresp and Incruse. She is mainly staying in the home, not active. She gets winded after half a block, which is similar to last visit. Not taking any stairs. Using oxygen 2 L/min intermittently throughout the day for SOB or when she is exerting herself (e.g. house chores). \par \par Plan:\par - Continue Breo 200-25, Dailiresp and Incruse, as well as PRN Xopenex\par - Will send for repeat PFTs & 6MWT (last set was in August 2019)\par \par 2. Dyspnea on exertion\par Patient with worsening chest tightness, wheezing and MARIE recently requiring more frequent use of rescue inhaler (using Xopenex 1-2x/day). Ultrasound performed today 9/29/20. B-lines observed & curtain sign noted. No evidence of pleural effusion on right or left side. Diaphragmatic movement normal. \par \par Plan:\par - Will order autoimmune serologies & other inflammatory markers to see if there is any autoimmune/inflammatory condition which may be contributing to her MARIE (e.g. Lupus, RF) as well as a D-Dimer, CBC & Pro-BNP for further workup of shortness of breath \par - Continue Breo 200-25, Dailiresp and Incruse, as well as PRN Xopenex\par - Will send Marce for repeat PFT/6MWT (and covid pre-testing)\par \par 3.   Pulmonary hypertension and hypoxemic respiratory failure\par Marce falls in the 10% of patients with COPD who develop pulmonary hypertension.  She tried Adcirca in the past but was unable to tolerate it due to headaches. She is currently on sildenafil 20 mg in the AM, 20 mg in the afternoon, and 40 mg HS. Tolerating medication well.\par \par Had recent right heart catheterization in Dr. Beavers's office. PA pressures 38/16 with mean 26. Pulmonary capillary occlusion pressure 14.0 Cardiac output 5.8. Diastolic gradient 2.0. Transpulmonary pressure gradient 12.0 (both normal). PVR (12/5.8) 2.0. Based upon the right heart catheterization findings, there is no evidence of precapillary pulmonary hypertension at rest. She saw Dr. Abbasi recently who did an echo in his office. Although, because this was an office bedside echocardiogram, we likely won't be able to measure right-sided pressures which is what we are most interested in. \par \par She states when she checks pulse ox at home, it is generally 97%. 92% today on room air.\par \par Plan:\par - We will proceed with stress echocardiogram. If her stress echo demonstrates increased pressure/exercise-induced pulmonary hypertension and if she desaturates on 6MWT, we will consider increasing her sildenafil dosage. Her diffusion capacity back in 8/2019 was severely reduced at 38% of predicted but stable as compared to a prior test. \par - PFT, 6MWT to be scheduled\par - Asked her to check her SpO2 with exertion\par - Determine next steps based on testing results   \par \par 4.   GEO\par Marce has GEO and is using CPAP; she received a new machine.   No compliance issues or complaints were noted. \par \par 5.   Pulmonary nodules \par  The micro-nodules appear stable on 8/17/19 CT and do not require further follow-up.  \par \par 6. Allergic rhinitis\par Marce has been evaluated by her ENT in May, she was given an abx for sinusitis and she was started on Flonase. I advised that she continue to follow-up with her ENT, especially if she continues with congestion or develops new symptoms. For now, she can continue Dymista BID and Budosenide BID nasal rinse.\par \par 7. Health maintenance\par Marce was administered Prevnar (13 valent) in May 2019. We can administer Pneumovax at next visit if she is still due. Due for flu vaccine for [id number] influenza season. \par \par Return to clinic: 3 months

## 2020-10-01 NOTE — DISCUSSION/SUMMARY
[FreeTextEntry1] : Attending Physical Exam 9-29-20\par - no pallor, icterus. Significant injection of the conjunctive especially in medial cavus in the right eye\par - no cervical adenopathy, no JVD at 45 degrees, no hepatojugular reflux, no clinically detected hepatosplenomegaly\par - no cyanosis, no clubbing, no articular manifestations of rheumatologic disease\par - no palmar erythema, no visible rash, equal peripheral pulses bilaterally\par - no pitting edema\par - no dullness to percussion, adequate air entry bilaterally, no wheezing, rhonchi or crackles \par - normal S1, S2, no murmurs, rubs or gallops

## 2020-10-02 LAB
APTT HEX PL PPP: NEGATIVE
HEX-1: 53.1 SECS
HEX-2: 51.4 SECS

## 2020-11-02 ENCOUNTER — RX RENEWAL (OUTPATIENT)
Age: 65
End: 2020-11-02

## 2020-12-02 NOTE — ED ADULT NURSE NOTE - ED CARDIAC RATE
Rx requested:  Requested Prescriptions     Pending Prescriptions Disp Refills    zolpidem (AMBIEN) 10 MG tablet 30 tablet 2     Sig: take 1 tablet by mouth NIGHTLY if needed for sleep    venlafaxine (EFFEXOR XR) 75 MG extended release capsule 60 capsule 5     Sig: Take 2 capsules by mouth daily    gabapentin (NEURONTIN) 300 MG capsule 90 capsule 2     Sig: Take 1 capsule by mouth 3 times daily for 30 days.        Last Office Visit:   11/19/2020      Last filled:  Gabapentin 11/2/2020  effexor 7/29/2020    Next Visit Date:  Future Appointments   Date Time Provider Holger Guardado   12/8/2020 11:15 AM SCHEDULE, [address] [facility] normal

## 2020-12-04 ENCOUNTER — LABORATORY RESULT (OUTPATIENT)
Age: 65
End: 2020-12-04

## 2020-12-07 ENCOUNTER — OUTPATIENT (OUTPATIENT)
Dept: OUTPATIENT SERVICES | Facility: HOSPITAL | Age: 65
LOS: 1 days | End: 2020-12-07
Payer: MEDICARE

## 2020-12-07 ENCOUNTER — NON-APPOINTMENT (OUTPATIENT)
Age: 65
End: 2020-12-07

## 2020-12-07 DIAGNOSIS — J45.909 UNSPECIFIED ASTHMA, UNCOMPLICATED: ICD-10-CM

## 2020-12-07 PROCEDURE — 94618 PULMONARY STRESS TESTING: CPT

## 2020-12-07 PROCEDURE — 94729 DIFFUSING CAPACITY: CPT

## 2020-12-07 PROCEDURE — 94618 PULMONARY STRESS TESTING: CPT | Mod: 26

## 2020-12-07 PROCEDURE — 94726 PLETHYSMOGRAPHY LUNG VOLUMES: CPT | Mod: 26

## 2020-12-07 PROCEDURE — 94060 EVALUATION OF WHEEZING: CPT

## 2020-12-07 PROCEDURE — 94729 DIFFUSING CAPACITY: CPT | Mod: 26

## 2020-12-07 PROCEDURE — 94760 N-INVAS EAR/PLS OXIMETRY 1: CPT

## 2020-12-07 PROCEDURE — 94726 PLETHYSMOGRAPHY LUNG VOLUMES: CPT

## 2020-12-07 PROCEDURE — 94010 BREATHING CAPACITY TEST: CPT | Mod: 26

## 2020-12-07 RX ORDER — ALBUTEROL 90 UG/1
2 AEROSOL, METERED ORAL ONCE
Refills: 0 | Status: DISCONTINUED | OUTPATIENT
Start: 2020-12-07 | End: 2020-12-21

## 2020-12-11 NOTE — PHYSICAL EXAM
[No Acute Distress] : no acute distress [Well Nourished] : well nourished [Oriented x3] : oriented x3 [Normal Affect] : normal affect

## 2020-12-17 ENCOUNTER — APPOINTMENT (OUTPATIENT)
Dept: PULMONOLOGY | Facility: CLINIC | Age: 65
End: 2020-12-17
Payer: MEDICARE

## 2020-12-17 DIAGNOSIS — Z87.09 PERSONAL HISTORY OF OTHER DISEASES OF THE RESPIRATORY SYSTEM: ICD-10-CM

## 2020-12-17 PROCEDURE — 99443: CPT | Mod: 95

## 2020-12-18 NOTE — REASON FOR VISIT
[Follow-Up] : a follow-up visit [Sleep Apnea] : sleep apnea [COPD] : COPD [Pulmonary Hypertension] : pulmonary hypertension [Pulmonary Nodules] : pulmonary nodules [Home] : at home, [unfilled] , at the time of the visit. [Medical Office: ([facility])___] : at the medical office located in  [Verbal consent obtained from patient] : the patient, [unfilled]

## 2020-12-18 NOTE — ASSESSMENT
[FreeTextEntry1] : 12-17-20\par It was a pleasure to speak with Marce in follow-up today. Her respiratory issues are summarized:\par \par 1.   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  This was done since she has extensive emphysema B/L in both upper and lower lobes. She is using Breo and Incruse due to her reduced inspiratory flows.   Additionally, since she was having frequent exacerbations of COPD in the past, she was started on Daliresp, which has reduced the frequency of AECOPD. She has not had a COPD exacerbation or hospitalization in the last year. \par There is a severe obstructive defect on spirometry at her last visit and her diffusion capacity was severely reduced. Diffuse emphysematous changes were visible on CT chest.\par \par She has been evaluated by Dr. Jose Arzate at the [facility]. We have again requested her to follow-up with him. She has the phone number to schedule a follow-up appointment (last appointment with him had been canceled due to COVID-19 pandemic). \par \par She is using the Xopenex only as needed. Continues on Breo 200-25, Daliresp and Incruse. She is mainly staying in the home, not active. She gets winded after 1.5 blocks.  Last visit she could only walk half a block, which is significantly improved. Not taking any stairs. Using oxygen 2 L/min intermittently throughout the day for SOB or when she is exerting herself (e.g. house chores). \par \par Repeat PFT done on 12/7/20 shows severe obstructive defect FVC: 2.58 L (94%), FEV1: 1.06 L (50%), FEV1/FVC: 41% and severely reduced diffusion capacity (32%). Lung volumes were normal (92%). Compared to August 2019 and April 2018 PFTs, her spirometry and diffusion capacity are stable. On 6MWT done 12/7/20, she walked 299 meters. Resting O2 saturation was 95% on RA. Patient dropped to 85% on room air at 2 minutes. Patient given 2L 02 via nasal cannula. End test O2 saturation was 89% on 2 liters O2. On prior PFT, Esther walked 240 meters.\par \par Plan:\par - Continue Breo 200-25, Dailiresp and Incruse, as well as PRN Xopenex\par \par 2. Dyspnea on exertion\par Last visit, she was experiencing worsening chest tightness, wheezing and MARIE and requiring more frequent use of rescue inhaler (using Xopenex 1-2x/day). Ultrasound performed last visit 9/29/20. B-lines observed & curtain sign noted. No evidence of pleural effusion on right or left side. Diaphragmatic movement normal. Autoimmune serologies were done on 9/29/20. Double stranded DNA Ab was positive (83), but C4, Butler ab, RNP ab, YOMI, CH50, C3, CCP ab, BNP, RF, ESR, d-dimer were negative. Her Hgb was normal on CBC. Today she reports improved symptoms, improved exercise tolerance and less use of Xopenex.\par \par Plan:\par - Continue Breo 200-25, Dailiresp and Incruse, as well as PRN Xopenex\par \par 3.   Pulmonary hypertension and hypoxemic respiratory failure\par Marce falls in the 10% of patients with COPD who develop pulmonary hypertension.  She tried Adcirca in the past but was unable to tolerate it due to headaches. She is currently on sildenafil 20 mg in the AM, 20 mg in the afternoon, and 40 mg HS. Tolerating medication well.\par \par Had recent right heart catheterization in Dr. Beavers's office. PA pressures 38/16 with mean 26. Pulmonary capillary occlusion pressure 14.0 Cardiac output 5.8. Diastolic gradient 2.0. Transpulmonary pressure gradient 12.0 (both normal). PVR (12/5.8) 2.0. Based upon the right heart catheterization findings, there is no evidence of precapillary pulmonary hypertension at rest. She saw Dr. Abbasi recently who did an echo in his office. Although, because this was an office bedside echocardiogram, we likely won't be able to measure right-sided pressures which is what we are most interested in. \par \par She states when she checks pulse ox at home, it is generally 97%. \par \par Plan:\par - We will proceed with stress echocardiogram. If her stress echo demonstrates increased pressure/exercise-induced pulmonary hypertension and if she desaturates on 6MWT, we will consider increasing her sildenafil dosage (max sildenafil dosage is 40-60mg TID) and will consider adding an endothelial  receptor antagonist. Her diffusion capacity back in 8/2019 was severely reduced at 38% of predicted but stable as compared to a prior test. Most recent diffusion on 12/7/20 was stable, (32%). \par - Her appt for stress echo is 12/22/20. I called echo and asked them to measure patient's O2 simultaneously while performing stress echo and to give her O2 as needed.\par - Asked her to check her SpO2 with exertion\par - Determine next steps based on testing results   \par \par 4.   GEO\par Marce has GEO and is using CPAP; she received a new machine.   No compliance issues or complaints were noted. \par \par 5.   Pulmonary nodules \par  The micro-nodules appear stable on 8/17/19 CT and do not require further follow-up.  \par \par 6. Allergic rhinitis\par Marce has been evaluated by her ENT in May, she was given an abx for sinusitis and she was started on Flonase. I advised that she continue to follow-up with her ENT, especially if she continues with congestion or develops new symptoms. For now, she can continue Dymista BID and Budosenide BID nasal rinse.\par \par 7. Health maintenance\par Marce was administered Prevnar (13 valent) in May 2019. We can administer Pneumovax at next visit if she is still due. Flu vaccine for [id number] influenza season. \par \par Return to clinic: 3 months

## 2020-12-18 NOTE — HISTORY OF PRESENT ILLNESS
[TextBox_4] : The patient is a 65 year old female with a history of obstructive sleep apnea. \par 65 yo AAF with Asthma/COPD presents for follow-up. Pt with diffuse centrilobular emphysema on triple therapy and daliresp, pulmonary HTN with PAS 43mmHg on RHC on sildenafil, stable pulmonary nodules, and severe exercise restriction\par \par 12-17-20\par She has increased exercise tolerance. She was previously able to walk half a block. Now she can walk 1.5 blocks\par Continues on Breo 200-25, Dailiresp and Incruse. She using Xopenex less often.\par Requiring more frequent use of Xopenex the past few weeks, at least 1-2x/day. Feels her asthma is acting up lately with weather changes. Having more chest tightness, wheezing and allergy symptoms (water/itchy eyes). Denies cough. SOB with exertion- can only walk half a block before feeling SOB. Not taking any stairs. Not doing any exercise. Using oxygen during the day on exertion 2 L/min (e.g. house chores). Pulse oximeter generally around 97% when she checks it at home. Right now SpO2 92% at rest. \par Continues on sildenafil (20, 20, 40 mg). Tolerating medication well. \par Had echo with Dr. Abbasi and cardiac cath on 8/24/20. \par On CPAP for GEO & using throughout the night.\par No fevers or chills.  Denies chest pain or palpitations. \par  Statement Selected

## 2020-12-18 NOTE — PROCEDURE
[FreeTextEntry1] : PFT 20\par FVC: 2.58 L (94%)--> 2.49 L (91%)  \par FEV1: 1.06 L (50%)--> 1.06 L (50%)  \par FEV1/FVC: 41%--> 42%\par OKC79-30%: 0.31 L/s (16%)--> 0.32 L/s (16%)\par T.83 L (92%)\par RV/T%\par DLCO: 6.9 (32%)\par \par 6MWT 20\par Patient walked 299 meters during a 6 minute walk test.\par Resting O2 saturation was 95% on RA.  Heart rate 76 bpm. Patient dropped to 85% on room air at 2 minutes. Patient given 2L 02 via nasal cannula.\par End test O2 saturation was 89% on 2 liters O2.  Heart rate 102 bpm.  Ivis scale end of test : 0\par This signifies reduced walk distance and significant desaturation\par \par Ultrasound 20\par - B-lines observed curtain sign noted. No evidence of pleural effusion. Diaphragmatic movement normal. \par \par Cardiac cath 20\par PA pressures 38/16 with mean 26. Pulmonary capillary occlusion pressure 14.0 Cardiac output 5.8. Diastolic gradient 2.0. Transpulmonary pressure gradient 12.0 (both normal). PVR (12/5.8) 2.0.\par \par Abhinav, DLCO 19:\par FVC: 2.71 L (85%)\par FEV1: 1.26 L (50%)\par FEV1/FVC: 47%\par HWN42-28%: 0.45 L/s (17%)\par DLCO: 8.7 (38%)\par 6MWT: She declined 6MWT today.\par Severe obstructive lung defect. Severely decreased diffusion capacity. \par \par EXAM: CT CHEST  PROCEDURE DATE: 2019 \par COMPARISON: CT chest 2017. \par Lungs and large airways: Again seen is diffuse centrilobular and paraseptal emphysema. \par There has been a decrease in size of previously seen 0.4 mm left upper lobe nodule, now measuring 3 mm (series 4 image 96). \par There is a 4 mm nodule in the superior portion of the left lower lobe (series 4 image 186). \par Pleura: No pleural effusion. \par Mediastinum and hilar regions: No thoracic lymphadenopathy. \par Heart and pericardium: Heart size is normal. No pericardial effusion. Severe coronary artery disease. \par Vessels: Mild calcified plaque aorta. Again seen is enlargement of the main pulmonary artery measuring 3.8 cm. \par Chest wall and lower neck: Normal. \par Upper abdomen: Status post cholecystectomy. Subcentimeter hypodensity in right lobe of liver too small to characterize although likely representing small cyst. \par Bones: Mild degenerative changes. \par IMPRESSION: \par 1. Diffuse emphysematous changes. \par 2. One or two micronodules and nodules (measuring <6mm), stable from 2017. Given risk factors 12 months follow-up low dose CT screening is recommended. \par 3. No change in dilation of the main pulmonary artery which could be a sequela pulmonary hypertension. \par \par Abhinav, DLCO, 6MWT 18:\par FVC: 2.61 L (93%)\par FEV1: 1.22 L (56%)\par FEV1/FVC: 47%\par QXG15-56%: 0.39 L/s (19%)\par DLCO: 7.5 (32%)\par 6MWT: 240 meters, SpO2 start; 95% --> spO2 end: 85%\par Moderately severe obstructive defect. Severely reduced diffusion capacity.  6MWT was aborted at 4 minutes due to desaturation; pt also notes walk distance was limited secondary to knee pain. Reduced walk distance with significant desaturation. \par \par EXAM:  ECHOCARDIOGRAM (CARDIOL) 2018  \par Normal left ventricular size and wall thickness.The left ventricular wall  motion is normal.The left ventricular ejection fraction is normal.The left ventricular ejection fraction is 60%.The right ventricle is normal in size and function.The left atrial size is normal.Right atrial size is normal.Structurally normal aortic valve.No aortic regurgitation noted.Mildly calcified anterior leaflet of the mitral valve.There is mild to moderate mitral regurgitation.Structurally normal tricuspid valve.There is trace to mild tricuspid regurgitation.There is mild pulmonary hypertension.The pulmonary artery systolic pressure is estimated to be 40 mmHg.Structurally normal pulmonic valve.There is trace pulmonic regurgitation.There is no\par  pericardial effusion.There is no significant change when compared to the echo from 2017.\par \par Spirometry\par FVC: 2.83 100%\par FEV1: 1.19 54%\par FEV1/FVC: 42%\par DLCO: 31%\par \par AAT: WNL (117)\par \par Echo: 17: EF 55-60%, mild PHTN with PASP 40mmHg\par \par Spirometry and DLCO 17:\par FVC: 2.71 L (95% pred) --> 2.58 L (90% pred)\par FEV1: 1.20 L (54% pred) --> 1.14 L (51% pred)\par FEV1/FVC: 44% --> 44%\par CHD46-26%: 0.30 L/s (14% pred) --> 0.35 L/s (16% pred)\par DLCO: 8.0 (32% pred)\par PI max 17: 01npH8B (55% pred)\par 6MWT: 382m, SpO2 start: 95% --> SpO2 end: 88%\par Severe obstruction. Severely reduced diffusion capacity. Vastly improved walk distance (about 300m more than last) and improvement in degree of desaturation (but still with significant desaturation). \par \par PFT 3/28/17:\par FVC: 2.39 L (83% pred) --> 2.22 L (77% pred)\par FEV1: 1.14 L (50% pred) --> 1.09 L (48% pred)\par FEV1/FVC: 48% --> 49%\par HCX25-34%: 0.42 L/s (19% pred) --> 0.40 L/s (18% pred)\par T.84 L (92% pred)\par RV/T%\par DLCO: 8.1 (33% pred)\par 6MWT 3/28/17: Pt declined due to pain from heel spur\par Severe obstructive defect. Normal lung volumes. Very severely reduced DLCO.  Air trapping. \par \par Abhinav with bronchodilator 16:\par FVC: 2.34 L (83% pred) --> 2.67 L (94% pred)\par FEV1: 1.07 L (48% pred) --> 1.24 L (56% pred)\par FEV1/FVC: 46% --> 46%\par SUU04-88%: 0.38 L/s (18% pred) --> 0.41 L/s (19% pred)\par 330cc increase in FVC post-bronchodilator. Moderate obstruction. \par \par CXR 16: pt somewhat rotated, haziness in the LLL which is also seen on the lateral film, cannot rule out pneumonia of the LLL\par \par Spirometry 16:\par FVC: 2.71 L (94% pred)\par FEv1: 1.23 L (55% pred)\par FEV1/FVC: 46%\par ZHV77-48%: 0.42 L/s (19% pred)\par DLCO 16: 9.3 (33% pred <-- 29%)\par 6MWT 16: Walk time: 2:00, 86 meters, SpO2 start: 89% --> SpO2 end: 82%\par Improvement in FEV1 and FVC as compared to prior test.  Severe obstructive lung disease. Severely reduced diffusion capacity. Reduced walk distance but test aborted early due to desaturation.  Degree of desaturation improved since previous test. \par \par PFTs 16\par FVC: 1.71 (59%)\par FEV1: 0.98 (44%)\par FEV1/FVC 58%\par FEF25/75: 0.59 (27%)\par T.38 (83%)\par DLCO: 7.3 (29%)\par Severe obstructive lung disease with severe reduced DLCO. No significant bronchodilator response\par \par PFTs 16\par FVC: 2.57 89%\par FEV1: 1.19 52%\par FEV1/FVC: 46%\par DLCO 37%\par 6MWT: 86 meters, Baseline: 88% End: 74%\par \par PFTs 12/17/15\par FVC: 2.73 (94% of pred)\par FEV1: 1.31 l (57% of pred)\par FEV1/FVC: 48%\par GMZ14-45%: 0.36 L/s (59% of pred)\par %\par DLCO 43%\par Pt declining 6MWT today \par \par Spirometry:\par FVC: 2.46 L (86% of pred)\par FEV1: 1.26 l (56% of pred)\par FEV1/FVC: 51.2%\par VAV93-28%: 0.64 L/s (29% of pred)\par C/w moderate obstructive pulmonary disease.\par \par RHC 10/13/15:\par -Pulmonary capillary occlusion pressure: 17 mm of Hg\par -Pulmonary artery systolic: 40 mmHg\par -Pulmonary artery diastolic: 20mm Hg\par -Mean pulmonary artery pressure: 29 mm Hg\par -Right atrial pressure: 12mm Hg\par -Cardiac output was 4.43\par Based upon these numbers, the pulmonary artery diastolic and the wedge gradient is normal at 3. The trans-pulmonary pressure gradient is mildly elevated at 12. With a cardiac output of 4.43 L per minute, the pulmonary vascular resistance is normal at slightly less than 3 Woods units.  There is no increase in intrinsic pulmonary vascular resistance. The pulmonary artery pressures are also not elevated out of proportion to the left-sided pressures therefore the patient is not a candidate for advanced vasodilator therapy.\par \par TTE 12/19/15:\par LVH, nomal LVEF PA sys press 34mmHg, mild MR & TR\par \par CT chest CTAPE 08/10/15\par IMPRESSION: No evidence for pulmonary emboli. No significant change in moderate emphysematous changes. No significant change in main pulmonary arterial dilatation, possibly reflecting sequelae of pulmonary arterial hypertension.

## 2021-01-05 ENCOUNTER — FORM ENCOUNTER (OUTPATIENT)
Age: 66
End: 2021-01-05

## 2021-01-06 ENCOUNTER — OUTPATIENT (OUTPATIENT)
Dept: OUTPATIENT SERVICES | Facility: HOSPITAL | Age: 66
LOS: 1 days | End: 2021-01-06
Payer: MEDICARE

## 2021-01-06 DIAGNOSIS — I27.20 PULMONARY HYPERTENSION, UNSPECIFIED: ICD-10-CM

## 2021-01-06 DIAGNOSIS — J44.9 CHRONIC OBSTRUCTIVE PULMONARY DISEASE, UNSPECIFIED: ICD-10-CM

## 2021-01-06 DIAGNOSIS — G47.33 OBSTRUCTIVE SLEEP APNEA (ADULT) (PEDIATRIC): ICD-10-CM

## 2021-01-06 PROCEDURE — 93351 STRESS TTE COMPLETE: CPT

## 2021-01-06 PROCEDURE — 93351 STRESS TTE COMPLETE: CPT | Mod: 26,52

## 2021-02-17 ENCOUNTER — RX RENEWAL (OUTPATIENT)
Age: 66
End: 2021-02-17

## 2021-03-17 ENCOUNTER — APPOINTMENT (OUTPATIENT)
Dept: UROLOGY | Facility: CLINIC | Age: 66
End: 2021-03-17

## 2021-04-01 ENCOUNTER — RX RENEWAL (OUTPATIENT)
Age: 66
End: 2021-04-01

## 2021-04-15 ENCOUNTER — RX RENEWAL (OUTPATIENT)
Age: 66
End: 2021-04-15

## 2021-06-17 ENCOUNTER — APPOINTMENT (OUTPATIENT)
Dept: UROLOGY | Facility: CLINIC | Age: 66
End: 2021-06-17
Payer: MEDICARE

## 2021-06-17 VITALS
HEIGHT: 68 IN | BODY MASS INDEX: 27.74 KG/M2 | WEIGHT: 183 LBS | DIASTOLIC BLOOD PRESSURE: 78 MMHG | HEART RATE: 80 BPM | TEMPERATURE: 98.6 F | SYSTOLIC BLOOD PRESSURE: 122 MMHG | RESPIRATION RATE: 13 BRPM

## 2021-06-17 DIAGNOSIS — N20.9 URINARY CALCULUS, UNSPECIFIED: ICD-10-CM

## 2021-06-17 PROCEDURE — 99204 OFFICE O/P NEW MOD 45 MIN: CPT

## 2021-06-17 RX ORDER — ALBUTEROL 90 MCG
90 AEROSOL (GRAM) INHALATION
Refills: 0 | Status: ACTIVE | COMMUNITY

## 2021-06-17 RX ORDER — OMEPRAZOLE 40 MG/1
40 CAPSULE, DELAYED RELEASE ORAL
Refills: 0 | Status: ACTIVE | COMMUNITY

## 2021-06-17 NOTE — HISTORY OF PRESENT ILLNESS
[FreeTextEntry1] : cc right sided pain \par 67 yo fem referred for eval kidney stone \par had ct 9/20 showed jacy punctate stone small stone \par c/o right sided pain sharp npthing better or worse \par no dysuria or hematuria

## 2021-06-17 NOTE — ASSESSMENT
[FreeTextEntry1] : ct images reviewed \par tiny nonobstructing stone not cause of pain \par Discussed the management options for non obstructing kidney stones- watch and wait vs intervention via ureteroscopic approach vs ESL. Risks and benefits of each modality were discussed.  \par Recommended Kidney stone prevention diet:\par - Good oral hydration so that urine is clear to light yellow, usually 1.5 to 2 Liters of fluids, mainly water\par - Less red meat\par - Less salt\par - Limit foods with oxalate like- dark green vegetables, rhubarb, chocolate, wheat bran, nuts, cranberries, and beans\par - Increase citrate in diet by consuming citrus fruits and juices. Discussed that cami and limes have the most citric acid, oranges, grapefruits, and berries also contain appreciable amounts. \par \par small renal cyst will be observe

## 2021-06-18 LAB
APPEARANCE: CLEAR
BACTERIA: NEGATIVE
BILIRUBIN URINE: NEGATIVE
BLOOD URINE: NEGATIVE
COLOR: YELLOW
GLUCOSE QUALITATIVE U: NEGATIVE
HYALINE CASTS: 0 /LPF
KETONES URINE: NEGATIVE
LEUKOCYTE ESTERASE URINE: NEGATIVE
MICROSCOPIC-UA: NORMAL
NITRITE URINE: NEGATIVE
PH URINE: 6.5
PROTEIN URINE: NORMAL
RED BLOOD CELLS URINE: 3 /HPF
SPECIFIC GRAVITY URINE: 1.02
SQUAMOUS EPITHELIAL CELLS: 1 /HPF
UROBILINOGEN URINE: ABNORMAL
WHITE BLOOD CELLS URINE: 1 /HPF

## 2021-06-24 LAB — BACTERIA UR CULT: ABNORMAL

## 2021-06-25 ENCOUNTER — APPOINTMENT (OUTPATIENT)
Dept: UROLOGY | Facility: CLINIC | Age: 66
End: 2021-06-25
Payer: MEDICARE

## 2021-06-25 DIAGNOSIS — N30.90 CYSTITIS, UNSPECIFIED W/OUT HEMATURIA: ICD-10-CM

## 2021-06-25 PROCEDURE — 51701 INSERT BLADDER CATHETER: CPT

## 2021-06-28 PROBLEM — N30.90 CYSTITIS: Status: ACTIVE | Noted: 2021-06-28

## 2021-06-28 LAB — BACTERIA UR CULT: NORMAL

## 2021-07-13 ENCOUNTER — APPOINTMENT (OUTPATIENT)
Dept: PULMONOLOGY | Facility: CLINIC | Age: 66
End: 2021-07-13

## 2021-08-05 ENCOUNTER — APPOINTMENT (OUTPATIENT)
Dept: PULMONOLOGY | Facility: CLINIC | Age: 66
End: 2021-08-05
Payer: MEDICARE

## 2021-08-05 VITALS
BODY MASS INDEX: 27.58 KG/M2 | OXYGEN SATURATION: 93 % | TEMPERATURE: 97.7 F | SYSTOLIC BLOOD PRESSURE: 135 MMHG | HEIGHT: 68 IN | HEART RATE: 86 BPM | WEIGHT: 182 LBS | DIASTOLIC BLOOD PRESSURE: 75 MMHG

## 2021-08-05 PROCEDURE — G0009: CPT

## 2021-08-05 PROCEDURE — 99215 OFFICE O/P EST HI 40 MIN: CPT | Mod: 25

## 2021-08-05 PROCEDURE — 90732 PPSV23 VACC 2 YRS+ SUBQ/IM: CPT

## 2021-08-06 ENCOUNTER — NON-APPOINTMENT (OUTPATIENT)
Age: 66
End: 2021-08-06

## 2021-08-06 LAB
ALBUMIN SERPL ELPH-MCNC: 4.8 G/DL
ALP BLD-CCNC: 51 U/L
ALT SERPL-CCNC: 12 U/L
ANION GAP SERPL CALC-SCNC: 13 MMOL/L
AST SERPL-CCNC: 17 U/L
BASOPHILS # BLD AUTO: 0.02 K/UL
BASOPHILS NFR BLD AUTO: 0.3 %
BILIRUB SERPL-MCNC: 0.7 MG/DL
BUN SERPL-MCNC: 9 MG/DL
CALCIUM SERPL-MCNC: 9.8 MG/DL
CHLORIDE SERPL-SCNC: 109 MMOL/L
CO2 SERPL-SCNC: 24 MMOL/L
CREAT SERPL-MCNC: 0.68 MG/DL
CRP SERPL-MCNC: <3 MG/L
DEPRECATED D DIMER PPP IA-ACNC: <150 NG/ML DDU
EOSINOPHIL # BLD AUTO: 0.05 K/UL
EOSINOPHIL NFR BLD AUTO: 0.8 %
ERYTHROCYTE [SEDIMENTATION RATE] IN BLOOD BY WESTERGREN METHOD: 4 MM/HR
GLUCOSE SERPL-MCNC: 94 MG/DL
HCT VFR BLD CALC: 41.3 %
HGB BLD-MCNC: 13.5 G/DL
IMM GRANULOCYTES NFR BLD AUTO: 0.2 %
LYMPHOCYTES # BLD AUTO: 1.91 K/UL
LYMPHOCYTES NFR BLD AUTO: 31.5 %
MAN DIFF?: NORMAL
MCHC RBC-ENTMCNC: 31 PG
MCHC RBC-ENTMCNC: 32.7 GM/DL
MCV RBC AUTO: 94.9 FL
MONOCYTES # BLD AUTO: 0.25 K/UL
MONOCYTES NFR BLD AUTO: 4.1 %
NEUTROPHILS # BLD AUTO: 3.83 K/UL
NEUTROPHILS NFR BLD AUTO: 63.1 %
NT-PROBNP SERPL-MCNC: 10 PG/ML
PLATELET # BLD AUTO: 209 K/UL
POTASSIUM SERPL-SCNC: 4.3 MMOL/L
PROCALCITONIN SERPL-MCNC: 0.04 NG/ML
PROT SERPL-MCNC: 7.6 G/DL
RAPID RVP RESULT: NOT DETECTED
RBC # BLD: 4.35 M/UL
RBC # FLD: 12.4 %
SARS-COV-2 RNA PNL RESP NAA+PROBE: NOT DETECTED
SODIUM SERPL-SCNC: 145 MMOL/L
WBC # FLD AUTO: 6.07 K/UL

## 2021-08-06 NOTE — HISTORY OF PRESENT ILLNESS
[TextBox_4] : The patient is a 65 year old female with a history of obstructive sleep apnea. \par 65 yo AAF with Asthma/COPD presents for follow-up. Pt with diffuse centrilobular emphysema on triple therapy and daliresp, pulmonary HTN with PAS 43mmHg on RHC on sildenafil, stable pulmonary nodules, and severe exercise restriction\par \par 8-5-21:\par She has been having a sinus headache\par She can walk around a block. Her exercise tolerance has varied between walking half a block to 1.5 blocks\par Continues on Breo 200-25, Dailiresp and Incruse. She using Xopenex less often.\par She is needing the Xopenex 3 times a day for chest tightness and shortness of breath. Normally she uses it like once a day. Feels her asthma is acting up lately with weather changes. \par She went to Urgent care for chest tightness. She was given zpak, cough medicine and prednisone. It did not help so she went to her PCP and was given Levaquin (heard mucus in the lungs). She took Levaquin for 10 days, which helped. She took an x-ray, but hasn't seen the result\par She went back to urgent care and was given two nebulizer treatments\par Using oxygen during the day on exertion 2 L/min every other day. Pulse oximeter shows the lowest O2 sat being 89% and then she will use oxygen.\par Continues on sildenafil (40, 20, 40 mg). Tolerating medication well. \par Had echo with Dr. Vasquez and cardiac cath on 8/24/20. \par On CPAP for GEO & using throughout the night.\par No fevers or chills.  Denies chest pain or palpitations.

## 2021-08-06 NOTE — ASSESSMENT
[FreeTextEntry1] : 8-5-21:\par It was a pleasure to see Marce in follow-up today. Her respiratory issues are summarized:\par \par 1. 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 This was done since she has extensive emphysema B/L in both upper and lower lobes. She is using Breo and Incruse due to her reduced inspiratory flows. Additionally, since she was having frequent exacerbations of COPD in the past, she was started on Daliresp, which has reduced the frequency of AECOPD. She has not had a COPD exacerbation or hospitalization in the last year. \par There is a severe obstructive defect on spirometry at her last visit and her diffusion capacity was severely reduced. Diffuse emphysematous changes were visible on CT chest.\par \par She has been evaluated by Dr. Jose Arzate at the [facility]. \par \par Repeat PFT done on 12/7/20 shows severe obstructive defect FVC: 2.58 L (94%), FEV1: 1.06 L (50%), FEV1/FVC: 41% and severely reduced diffusion capacity (32%). Lung volumes were normal (92%). Compared to August 2019 and April 2018 PFTs, her spirometry and diffusion capacity are stable. On 6MWT done 12/7/20, she walked 299 meters. Resting O2 saturation was 95% on RA. Patient dropped to 85% on room air at 2 minutes. Patient given 2L 02 via nasal cannula. End test O2 saturation was 89% on 2 liters O2. On prior 6MWT, Esther walked 240 meters.\par \par She is using the Xopenex more often, 3 times a day. Continues on Breo 200-25, Daliresp and Incruse. She has a decreased exercise tolerance, currently 1 block (previously 1.5 blocks). Using oxygen 2 L/min intermittently throughout the day for SOB or when she is exerting herself (e.g. house chores). Her lowest O2 sat off of oxygen has been 89% on her pulse oximeter. She was recently treated in the past 2 weeks with antibiotics (azithromycin, levaquin), prednisone for increased chest tightness and cough. \par \par Chest x-ray today, 8/5/21 (PA film) shows no evidence of new opacities. There is no pulmonary vascular congestion. Costophrenic angle on the left side is attenuated, however this is also seen on the film from 11/2019. No evidence of pneumothorax.\par \par Plan:\par - Continue Breo 200-25, Dailiresp and Incruse, as well as PRN Xopenex\par - We will check d-dimer, pro-BNP, ESR, CRP, procalcitonin\par - We will check a nasal swab for RSV and other respiratory viruses\par - We will send sputum culture for bacteria, AFB and fungal elements x2\par - We will start her on Marce on Cefdinir 300mg BID x 14 days\par - We will start Marce on prednisone 40mg daily for 2 weeks\par - We will start Marce on Azithromycin MWF to prevent COPD exacerbations once she finishes the Cefdinir\par - In two months, we will consider referring Marce to Dr. Trivedi for Zephr valve evaluation\par \par 2. Dyspnea on exertion\par Today she is experiencing worsening chest tightness, wheezing and MARIE and requiring more frequent use of rescue inhaler (using Xopenex 3x/day). Ultrasound performed 9/29/20. showed B-lines observed & curtain sign noted. No evidence of pleural effusion on right or left side. Diaphragmatic movement normal. Autoimmune serologies were done on 9/29/20. Double stranded DNA Ab was positive (83), but C4, Butler ab, RNP ab, YOMI, CH50, C3, CCP ab, BNP, RF, ESR, d-dimer were negative. Her Hgb was normal on CBC. Chest x-ray in the office did not show evidence of pneumonia or pulmonary vascular congestion\par \par Plan:\par - Continue Breo 200-25, Dailiresp and Incruse, as well as PRN Xopenex\par - See plan above\par \par 3. Pulmonary hypertension and hypoxemic respiratory failure\par Marce falls in the 10% of patients with COPD who develop pulmonary hypertension. She tried Adcirca in the past but was unable to tolerate it due to headaches. She is currently on sildenafil 40 mg in the AM, 20 mg in the afternoon, and 40 mg HS. Tolerating medication well. In the past, she had chest pressure on 40mg TID.\par \par Had right heart catheterization in Dr. Vasquez's office. PA pressures 38/16 with mean 26. Pulmonary capillary occlusion pressure 14.0 Cardiac output 5.8. Diastolic gradient 2.0. Transpulmonary pressure gradient 12.0 (both normal). PVR (12/5.8) 2.0. Based upon the right heart catheterization findings, there is no evidence of precapillary pulmonary hypertension at rest. She saw Dr. Vasquez recently who did an echo in her office. Although, because this was an office bedside echocardiogram, we likely won't be able to measure right-sided pressures which is what we are most interested in. \par \par She states when she checks pulse ox at home, it is generally 97%. \par \par Plan:\par - Continue Sildenafil 40 mg in the AM, 20 mg in the afternoon, and 40 mg HS\par - We will evaluate for causes of shortness of breath (see above)\par - Asked her to continue to monitor her SpO2 with exertion\par \par 4. GEO\par Marce has GEO and is using CPAP; she received a new machine. No compliance issues or complaints were noted. \par \par 5. Pulmonary nodules \par  The micro-nodules appear stable on 8/17/19 CT and do not require further follow-up. \par \par 6. Allergic rhinitis\par Marce has been evaluated by her ENT in May, she was given an abx for sinusitis and she was started on Flonase. Today she has a sinus headache. She was recently treated with azithromycin and Levaquin in the past 2 weeks. She can continue Dymista BID and Budosenide BID nasal rinse.\par \par 7. Health maintenance\par Marce was administered Prevnar (13 valent) in May 2019. Pneumovax was administered today, 8/5/21.\par \par Return to clinic: 2 months.

## 2021-08-06 NOTE — DISCUSSION/SUMMARY
[FreeTextEntry1] : Attending's Summary:\par 8-5-21:\par -no pallor or icterus\par -no cervical or supraclavicular adenopathy, no JVD at 45 degrees, no hepatojugular reflux \par -no dullness, good air entry, inspiratory crackles left base 1/4 way up\par -distant heart sounds, no murmurs, rubs, gallops\par -no articular manifestations, no skin thickening, no palmar erythema, grade 1 clubbing, radial pulses palpable \par -no pedal edema

## 2021-08-06 NOTE — CONSULT LETTER
[Dear  ___] : Dear ~DON, [Consult Letter:] : I had the pleasure of evaluating your patient, [unfilled]. [Please see my note below.] : Please see my note below. [Consult Closing:] : Thank you very much for allowing me to participate in the care of this patient.  If you have any questions, please do not hesitate to contact me. [FreeTextEntry2] : Dr. Charly Vasquez

## 2021-08-06 NOTE — PROCEDURE
[FreeTextEntry1] : PFT 20\par FVC: 2.58 L (94%)--> 2.49 L (91%)  \par FEV1: 1.06 L (50%)--> 1.06 L (50%)  \par FEV1/FVC: 41%--> 42%\par FCG79-58%: 0.31 L/s (16%)--> 0.32 L/s (16%)\par T.83 L (92%)\par RV/T%\par DLCO: 6.9 (32%)\par \par 6MWT 20\par Patient walked 299 meters during a 6 minute walk test.\par Resting O2 saturation was 95% on RA.  Heart rate 76 bpm. Patient dropped to 85% on room air at 2 minutes. Patient given 2L 02 via nasal cannula.\par End test O2 saturation was 89% on 2 liters O2.  Heart rate 102 bpm.  Ivis scale end of test : 0\par This signifies reduced walk distance and significant desaturation\par \par Ultrasound 20\par - B-lines observed curtain sign noted. No evidence of pleural effusion. Diaphragmatic movement normal. \par \par Cardiac cath 20\par PA pressures 38/16 with mean 26. Pulmonary capillary occlusion pressure 14.0 Cardiac output 5.8. Diastolic gradient 2.0. Transpulmonary pressure gradient 12.0 (both normal). PVR (12/5.8) 2.0.\par \par Abhinav, DLCO 19:\par FVC: 2.71 L (85%)\par FEV1: 1.26 L (50%)\par FEV1/FVC: 47%\par FES52-79%: 0.45 L/s (17%)\par DLCO: 8.7 (38%)\par 6MWT: She declined 6MWT today.\par Severe obstructive lung defect. Severely decreased diffusion capacity. \par \par EXAM: CT CHEST  PROCEDURE DATE: 2019 \par COMPARISON: CT chest 2017. \par Lungs and large airways: Again seen is diffuse centrilobular and paraseptal emphysema. \par There has been a decrease in size of previously seen 0.4 mm left upper lobe nodule, now measuring 3 mm (series 4 image 96). \par There is a 4 mm nodule in the superior portion of the left lower lobe (series 4 image 186). \par Pleura: No pleural effusion. \par Mediastinum and hilar regions: No thoracic lymphadenopathy. \par Heart and pericardium: Heart size is normal. No pericardial effusion. Severe coronary artery disease. \par Vessels: Mild calcified plaque aorta. Again seen is enlargement of the main pulmonary artery measuring 3.8 cm. \par Chest wall and lower neck: Normal. \par Upper abdomen: Status post cholecystectomy. Subcentimeter hypodensity in right lobe of liver too small to characterize although likely representing small cyst. \par Bones: Mild degenerative changes. \par IMPRESSION: \par 1. Diffuse emphysematous changes. \par 2. One or two micronodules and nodules (measuring <6mm), stable from 2017. Given risk factors 12 months follow-up low dose CT screening is recommended. \par 3. No change in dilation of the main pulmonary artery which could be a sequela pulmonary hypertension. \par \par Abhinav, DLCO, 6MWT 18:\par FVC: 2.61 L (93%)\par FEV1: 1.22 L (56%)\par FEV1/FVC: 47%\par POF40-37%: 0.39 L/s (19%)\par DLCO: 7.5 (32%)\par 6MWT: 240 meters, SpO2 start; 95% --> spO2 end: 85%\par Moderately severe obstructive defect. Severely reduced diffusion capacity.  6MWT was aborted at 4 minutes due to desaturation; pt also notes walk distance was limited secondary to knee pain. Reduced walk distance with significant desaturation. \par \par EXAM:  ECHOCARDIOGRAM (CARDIOL) 2018  \par Normal left ventricular size and wall thickness.The left ventricular wall  motion is normal.The left ventricular ejection fraction is normal.The left ventricular ejection fraction is 60%.The right ventricle is normal in size and function.The left atrial size is normal.Right atrial size is normal.Structurally normal aortic valve.No aortic regurgitation noted.Mildly calcified anterior leaflet of the mitral valve.There is mild to moderate mitral regurgitation.Structurally normal tricuspid valve.There is trace to mild tricuspid regurgitation.There is mild pulmonary hypertension.The pulmonary artery systolic pressure is estimated to be 40 mmHg.Structurally normal pulmonic valve.There is trace pulmonic regurgitation.There is no\par  pericardial effusion.There is no significant change when compared to the echo from 2017.\par \par Spirometry\par FVC: 2.83 100%\par FEV1: 1.19 54%\par FEV1/FVC: 42%\par DLCO: 31%\par \par AAT: WNL (117)\par \par Echo: 17: EF 55-60%, mild PHTN with PASP 40mmHg\par \par Spirometry and DLCO 17:\par FVC: 2.71 L (95% pred) --> 2.58 L (90% pred)\par FEV1: 1.20 L (54% pred) --> 1.14 L (51% pred)\par FEV1/FVC: 44% --> 44%\par YLL24-27%: 0.30 L/s (14% pred) --> 0.35 L/s (16% pred)\par DLCO: 8.0 (32% pred)\par PI max 17: 60fsB9A (55% pred)\par 6MWT: 382m, SpO2 start: 95% --> SpO2 end: 88%\par Severe obstruction. Severely reduced diffusion capacity. Vastly improved walk distance (about 300m more than last) and improvement in degree of desaturation (but still with significant desaturation). \par \par PFT 3/28/17:\par FVC: 2.39 L (83% pred) --> 2.22 L (77% pred)\par FEV1: 1.14 L (50% pred) --> 1.09 L (48% pred)\par FEV1/FVC: 48% --> 49%\par BXO33-52%: 0.42 L/s (19% pred) --> 0.40 L/s (18% pred)\par T.84 L (92% pred)\par RV/T%\par DLCO: 8.1 (33% pred)\par 6MWT 3/28/17: Pt declined due to pain from heel spur\par Severe obstructive defect. Normal lung volumes. Very severely reduced DLCO.  Air trapping. \par \par Abhinav with bronchodilator 16:\par FVC: 2.34 L (83% pred) --> 2.67 L (94% pred)\par FEV1: 1.07 L (48% pred) --> 1.24 L (56% pred)\par FEV1/FVC: 46% --> 46%\par QRT09-47%: 0.38 L/s (18% pred) --> 0.41 L/s (19% pred)\par 330cc increase in FVC post-bronchodilator. Moderate obstruction. \par \par CXR 16: pt somewhat rotated, haziness in the LLL which is also seen on the lateral film, cannot rule out pneumonia of the LLL\par \par Spirometry 16:\par FVC: 2.71 L (94% pred)\par FEv1: 1.23 L (55% pred)\par FEV1/FVC: 46%\par YRB33-80%: 0.42 L/s (19% pred)\par DLCO 16: 9.3 (33% pred <-- 29%)\par 6MWT 16: Walk time: 2:00, 86 meters, SpO2 start: 89% --> SpO2 end: 82%\par Improvement in FEV1 and FVC as compared to prior test.  Severe obstructive lung disease. Severely reduced diffusion capacity. Reduced walk distance but test aborted early due to desaturation.  Degree of desaturation improved since previous test. \par \par PFTs 16\par FVC: 1.71 (59%)\par FEV1: 0.98 (44%)\par FEV1/FVC 58%\par FEF25/75: 0.59 (27%)\par T.38 (83%)\par DLCO: 7.3 (29%)\par Severe obstructive lung disease with severe reduced DLCO. No significant bronchodilator response\par \par PFTs 16\par FVC: 2.57 89%\par FEV1: 1.19 52%\par FEV1/FVC: 46%\par DLCO 37%\par 6MWT: 86 meters, Baseline: 88% End: 74%\par \par PFTs 12/17/15\par FVC: 2.73 (94% of pred)\par FEV1: 1.31 l (57% of pred)\par FEV1/FVC: 48%\par PVK22-17%: 0.36 L/s (59% of pred)\par %\par DLCO 43%\par Pt declining 6MWT today \par \par Spirometry:\par FVC: 2.46 L (86% of pred)\par FEV1: 1.26 l (56% of pred)\par FEV1/FVC: 51.2%\par UGT81-06%: 0.64 L/s (29% of pred)\par C/w moderate obstructive pulmonary disease.\par \par RHC 10/13/15:\par -Pulmonary capillary occlusion pressure: 17 mm of Hg\par -Pulmonary artery systolic: 40 mmHg\par -Pulmonary artery diastolic: 20mm Hg\par -Mean pulmonary artery pressure: 29 mm Hg\par -Right atrial pressure: 12mm Hg\par -Cardiac output was 4.43\par Based upon these numbers, the pulmonary artery diastolic and the wedge gradient is normal at 3. The trans-pulmonary pressure gradient is mildly elevated at 12. With a cardiac output of 4.43 L per minute, the pulmonary vascular resistance is normal at slightly less than 3 Woods units.  There is no increase in intrinsic pulmonary vascular resistance. The pulmonary artery pressures are also not elevated out of proportion to the left-sided pressures therefore the patient is not a candidate for advanced vasodilator therapy.\par \par TTE 12/19/15:\par LVH, nomal LVEF PA sys press 34mmHg, mild MR & TR\par \par CT chest CTAPE 08/10/15\par IMPRESSION: No evidence for pulmonary emboli. No significant change in moderate emphysematous changes. No significant change in main pulmonary arterial dilatation, possibly reflecting sequelae of pulmonary arterial hypertension.

## 2021-08-06 NOTE — PHYSICAL EXAM
[Normal Oropharynx] : normal oropharynx [Normal Appearance] : normal appearance [No Neck Mass] : no neck mass [Normal Rate/Rhythm] : normal rate/rhythm [Normal S1, S2] : normal s1, s2 [No Murmurs] : no murmurs [No Resp Distress] : no resp distress [Clear to Auscultation Bilaterally] : clear to auscultation bilaterally [No Abnormalities] : no abnormalities [Benign] : benign [Normal Gait] : normal gait [No Clubbing] : no clubbing [No Cyanosis] : no cyanosis [No Edema] : no edema [FROM] : FROM [Normal Color/ Pigmentation] : normal color/ pigmentation [No Focal Deficits] : no focal deficits [TextBox_68] : no cervical or supraclavicular adenopathy, no JVD at 45 degrees, no hepatojugular reflux \par no dullness, good air entry, inspiratory crackles left base 1/4 way up

## 2021-08-06 NOTE — REVIEW OF SYSTEMS
[Fever] : no fever [Cough] : no cough [Chest Discomfort] : no chest discomfort [Edema] : no edema [Palpitations] : no palpitations [TextBox_94] : bilateral knee pain from arthritis

## 2021-08-09 ENCOUNTER — RX RENEWAL (OUTPATIENT)
Age: 66
End: 2021-08-09

## 2021-08-09 ENCOUNTER — NON-APPOINTMENT (OUTPATIENT)
Age: 66
End: 2021-08-09

## 2021-08-09 LAB
BACTERIA SPT CULT: NORMAL
BACTERIA SPT CULT: NORMAL

## 2021-08-09 RX ORDER — SACCHAROMYCES BOULARDII 50 MG
250 CAPSULE ORAL
Qty: 60 | Refills: 0 | Status: ACTIVE | COMMUNITY
Start: 2021-08-09 | End: 1900-01-01

## 2021-08-16 ENCOUNTER — NON-APPOINTMENT (OUTPATIENT)
Age: 66
End: 2021-08-16

## 2021-08-27 ENCOUNTER — NON-APPOINTMENT (OUTPATIENT)
Age: 66
End: 2021-08-27

## 2021-08-29 ENCOUNTER — NON-APPOINTMENT (OUTPATIENT)
Age: 66
End: 2021-08-29

## 2021-08-30 ENCOUNTER — NON-APPOINTMENT (OUTPATIENT)
Age: 66
End: 2021-08-30

## 2021-08-31 PROBLEM — Z92.29 HISTORY OF PNEUMOCOCCAL VACCINATION: Status: RESOLVED | Noted: 2019-05-23 | Resolved: 2021-08-31

## 2021-08-31 PROBLEM — H10.10 ALLERGIC CONJUNCTIVITIS: Status: RESOLVED | Noted: 2017-06-01 | Resolved: 2021-08-31

## 2021-08-31 PROBLEM — R09.02 OXYGEN DESATURATION: Status: RESOLVED | Noted: 2018-04-24 | Resolved: 2021-08-31

## 2021-09-01 ENCOUNTER — NON-APPOINTMENT (OUTPATIENT)
Age: 66
End: 2021-09-01

## 2021-09-07 ENCOUNTER — NON-APPOINTMENT (OUTPATIENT)
Age: 66
End: 2021-09-07

## 2021-09-07 ENCOUNTER — APPOINTMENT (OUTPATIENT)
Dept: HEART AND VASCULAR | Facility: CLINIC | Age: 66
End: 2021-09-07
Payer: MEDICARE

## 2021-09-07 DIAGNOSIS — Z01.818 ENCOUNTER FOR OTHER PREPROCEDURAL EXAMINATION: ICD-10-CM

## 2021-09-07 DIAGNOSIS — Z87.01 PERSONAL HISTORY OF PNEUMONIA (RECURRENT): ICD-10-CM

## 2021-09-07 DIAGNOSIS — M35.1 OTHER OVERLAP SYNDROMES: ICD-10-CM

## 2021-09-07 DIAGNOSIS — Z92.29 PERSONAL HISTORY OF OTHER DRUG THERAPY: ICD-10-CM

## 2021-09-07 DIAGNOSIS — Z11.59 ENCOUNTER FOR SCREENING FOR OTHER VIRAL DISEASES: ICD-10-CM

## 2021-09-07 DIAGNOSIS — I25.10 ATHEROSCLEROTIC HEART DISEASE OF NATIVE CORONARY ARTERY W/OUT ANGINA PECTORIS: ICD-10-CM

## 2021-09-07 DIAGNOSIS — R09.02 HYPOXEMIA: ICD-10-CM

## 2021-09-07 DIAGNOSIS — H10.10 ACUTE ATOPIC CONJUNCTIVITIS, UNSPECIFIED EYE: ICD-10-CM

## 2021-09-07 LAB
FUNGUS SPT CULT: NORMAL
FUNGUS SPT CULT: NORMAL

## 2021-09-20 ENCOUNTER — NON-APPOINTMENT (OUTPATIENT)
Age: 66
End: 2021-09-20

## 2021-09-22 ENCOUNTER — NON-APPOINTMENT (OUTPATIENT)
Age: 66
End: 2021-09-22

## 2021-09-26 ENCOUNTER — NON-APPOINTMENT (OUTPATIENT)
Age: 66
End: 2021-09-26

## 2021-09-26 LAB — ACID FAST STN SPT: NORMAL

## 2021-09-30 ENCOUNTER — APPOINTMENT (OUTPATIENT)
Dept: HEART AND VASCULAR | Facility: CLINIC | Age: 66
End: 2021-09-30
Payer: MEDICARE

## 2021-10-05 ENCOUNTER — APPOINTMENT (OUTPATIENT)
Dept: HEART AND VASCULAR | Facility: CLINIC | Age: 66
End: 2021-10-05
Payer: MEDICARE

## 2021-10-05 ENCOUNTER — NON-APPOINTMENT (OUTPATIENT)
Age: 66
End: 2021-10-05

## 2021-10-05 VITALS
WEIGHT: 293 LBS | DIASTOLIC BLOOD PRESSURE: 83 MMHG | BODY MASS INDEX: 47.09 KG/M2 | HEIGHT: 66 IN | SYSTOLIC BLOOD PRESSURE: 117 MMHG | HEART RATE: 86 BPM | OXYGEN SATURATION: 90 %

## 2021-10-05 DIAGNOSIS — F19.90 OTHER PSYCHOACTIVE SUBSTANCE USE, UNSPECIFIED, UNCOMPLICATED: ICD-10-CM

## 2021-10-05 PROCEDURE — 99204 OFFICE O/P NEW MOD 45 MIN: CPT | Mod: 25

## 2021-10-05 PROCEDURE — 93000 ELECTROCARDIOGRAM COMPLETE: CPT

## 2021-10-05 PROCEDURE — 90662 IIV NO PRSV INCREASED AG IM: CPT

## 2021-10-05 PROCEDURE — G0008: CPT

## 2021-10-05 RX ORDER — SIMVASTATIN 20 MG/1
20 TABLET, FILM COATED ORAL
Refills: 0 | Status: DISCONTINUED | COMMUNITY
End: 2021-10-05

## 2021-10-05 RX ORDER — ATORVASTATIN CALCIUM 20 MG/1
20 TABLET, FILM COATED ORAL DAILY
Qty: 90 | Refills: 3 | Status: ACTIVE | COMMUNITY
Start: 2021-10-05 | End: 1900-01-01

## 2021-10-05 RX ORDER — GLUCOSAMINE HCL 500 MG
75 MCG TABLET ORAL
Refills: 0 | Status: ACTIVE | COMMUNITY
Start: 2021-10-05

## 2021-10-05 NOTE — HISTORY OF PRESENT ILLNESS
[FreeTextEntry1] : Ms. Plunkett presents for initial evaluation and management of GEO, COPD, emphysema, pulmonary HTN, atrial fibrillation, dyslipidemia, and HTN. On 08/24/21 she underwent invasive coronary artery angiography which revealed mild diffuse CAD. On 01/06/21 she had an exercise stress echocardiogram which revealed no evidence of inducible ischemia at 82% MPHR but her pulmonary artery systolic pressures tony from a PASP of 41 mmHg to 84 mmHg post 5.5 METS of exercise.  She is a former smoker (0.5 PPD x 20 years) and has moderate obstructive disease on PFTs.  At present, she denies chest pain and has MARIE to 1 flight of stairs.

## 2021-10-05 NOTE — ASSESSMENT
[FreeTextEntry1] : 1. Atrial fibrillation: [id number] 3\par      - continue systemic anticoagulation with Pradaxa 150mg po bid\par      - discussed with patient avoidance of ASA and NSAIDS as well as need for bleeding surveillance \par      - continue propafenone 150mg po qd\par      - will follow left ventricular systolic function with serial echocardiograms \par \par 2. HTN: BP at ACC/AHA 2017 guideline target \par      - continue losartan 100mg po qd\par \par 3. Dyslipidemia:\par      - switch simvastatin 20mg po qhs to atorvastatin 20mg po qd \par      - discussed therapeutic lifestyle changes to promote improved lipid metabolism\par      - check lipid profile next visit\par \par 4. COPD: + former smoker with resultant pulmonary HTN (group 3):\par      - follow up with pulmonary HTN expert, Larry Epps MD\par      - continue inhaled medications\par      - will follow with serial echocardiograms \par \par \par An ECG was obtained to evaluate heart rhythm and screen for any underlying cardiac abnormalities.  \par

## 2021-10-05 NOTE — REASON FOR VISIT
[Other: ____] : [unfilled] [FreeTextEntry1] : Diagnostic Tests:\par ----------------------------------------\par ECG:\par 10/05/21: sinus rhythm, nonspecific T-wave abnormality.   \par ----------------------------------------\par Stress tests:\par 01/06/21: exercise stress echo: no ischemia at 82% MPHR, PASP 41 mmHg to 84 mmHg post exercise, 5.5 METS.   \par ----------------------------------------\par Cath:\par 08/24/20: mild diffuse coronary atherosclerosis. \par 10/15/15: RHC: CO 4.43, PVI 3.27, PA 43/8/21, PAOP 20/18/14, RA 13/12/9\par ----------------------------------------\par Echo:\par 09/05/19: EF 55%, PASP 45 mmHg. \par ----------------------------------------\par EP reports:\par 08/15/12: ELENA/DCCV for AF. \par ---------------------------------------\par PFTs:\par 12/07/20: moderate obstruction, severely decreased DLCO. \par ---------------------------------------\par CT:\par 08/17/19: chest: diffuse emphysematous changes, severe coronary artery calcifications. \par \par

## 2021-10-07 ENCOUNTER — NON-APPOINTMENT (OUTPATIENT)
Age: 66
End: 2021-10-07

## 2021-10-07 ENCOUNTER — APPOINTMENT (OUTPATIENT)
Dept: PULMONOLOGY | Facility: CLINIC | Age: 66
End: 2021-10-07
Payer: MEDICARE

## 2021-10-12 ENCOUNTER — APPOINTMENT (OUTPATIENT)
Dept: PULMONOLOGY | Facility: CLINIC | Age: 66
End: 2021-10-12
Payer: MEDICARE

## 2021-10-14 ENCOUNTER — NON-APPOINTMENT (OUTPATIENT)
Age: 66
End: 2021-10-14

## 2021-10-14 LAB — ACID FAST STN SPT: ABNORMAL

## 2021-10-15 ENCOUNTER — NON-APPOINTMENT (OUTPATIENT)
Age: 66
End: 2021-10-15

## 2021-10-21 ENCOUNTER — NON-APPOINTMENT (OUTPATIENT)
Age: 66
End: 2021-10-21

## 2021-10-28 ENCOUNTER — APPOINTMENT (OUTPATIENT)
Dept: PULMONOLOGY | Facility: CLINIC | Age: 66
End: 2021-10-28
Payer: MEDICARE

## 2021-10-28 VITALS
OXYGEN SATURATION: 93 % | DIASTOLIC BLOOD PRESSURE: 73 MMHG | BODY MASS INDEX: 28.93 KG/M2 | SYSTOLIC BLOOD PRESSURE: 128 MMHG | WEIGHT: 180 LBS | TEMPERATURE: 97.8 F | HEIGHT: 66 IN | HEART RATE: 86 BPM

## 2021-10-28 PROCEDURE — 99215 OFFICE O/P EST HI 40 MIN: CPT

## 2021-10-28 RX ORDER — PREDNISONE 20 MG/1
20 TABLET ORAL DAILY
Qty: 28 | Refills: 0 | Status: DISCONTINUED | COMMUNITY
Start: 2021-08-05 | End: 2021-10-28

## 2021-10-29 ENCOUNTER — NON-APPOINTMENT (OUTPATIENT)
Age: 66
End: 2021-10-29

## 2021-10-31 ENCOUNTER — NON-APPOINTMENT (OUTPATIENT)
Age: 66
End: 2021-10-31

## 2021-11-01 ENCOUNTER — NON-APPOINTMENT (OUTPATIENT)
Age: 66
End: 2021-11-01

## 2021-11-01 LAB — BACTERIA SPT CULT: ABNORMAL

## 2021-11-02 NOTE — HISTORY OF PRESENT ILLNESS
[TextBox_4] : \par 65 yo AAF with Asthma/COPD presents for follow-up. 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 She continues to feel well with no exacerbations. Continues on Breo, Incruse, Daliresp and Xopenex PRN; on O2 at home PRN; denies new SOB, cough, wheezing, fevers/chills. Did not start Azithromycin, as she was confused as to the dosing from the pharmacy. \par \par 8-5-21:\par She has been having a sinus headache\par She can walk around a block. Her exercise tolerance has varied between walking half a block to 1.5 blocks\par Continues on Breo 200-25, Dailiresp and Incruse. She using Xopenex less often.\par She is needing the Xopenex 3 times a day for chest tightness and shortness of breath. Normally she uses it like once a day. Feels her asthma is acting up lately with weather changes. \par She went to Urgent care for chest tightness. She was given zpak, cough medicine and prednisone. It did not help so she went to her PCP and was given Levaquin (heard mucus in the lungs). She took Levaquin for 10 days, which helped. She took an x-ray, but hasn't seen the result\par She went back to urgent care and was given two nebulizer treatments\par Using oxygen during the day on exertion 2 L/min every other day. Pulse oximeter shows the lowest O2 sat being 89% and then she will use oxygen.\par Continues on sildenafil (40, 20, 40 mg). Tolerating medication well. \par Had echo with Dr. Vasquez and cardiac cath on 8/24/20. \par On CPAP for GEO & using throughout the night.\par No fevers or chills.  Denies chest pain or palpitations.

## 2021-11-02 NOTE — PROCEDURE
[FreeTextEntry1] : PFT 20\par FVC: 2.58 L (94%)--> 2.49 L (91%)  \par FEV1: 1.06 L (50%)--> 1.06 L (50%)  \par FEV1/FVC: 41%--> 42%\par RVA78-17%: 0.31 L/s (16%)--> 0.32 L/s (16%)\par T.83 L (92%)\par RV/T%\par DLCO: 6.9 (32%)\par \par 6MWT 20\par Patient walked 299 meters during a 6 minute walk test.\par Resting O2 saturation was 95% on RA.  Heart rate 76 bpm. Patient dropped to 85% on room air at 2 minutes. Patient given 2L 02 via nasal cannula.\par End test O2 saturation was 89% on 2 liters O2.  Heart rate 102 bpm.  Ivis scale end of test : 0\par This signifies reduced walk distance and significant desaturation\par \par Ultrasound 20\par - B-lines observed curtain sign noted. No evidence of pleural effusion. Diaphragmatic movement normal. \par \par Cardiac cath 20\par PA pressures 38/16 with mean 26. Pulmonary capillary occlusion pressure 14.0 Cardiac output 5.8. Diastolic gradient 2.0. Transpulmonary pressure gradient 12.0 (both normal). PVR (12/5.8) 2.0.\par \par Abhinav, DLCO 19:\par FVC: 2.71 L (85%)\par FEV1: 1.26 L (50%)\par FEV1/FVC: 47%\par EBL33-99%: 0.45 L/s (17%)\par DLCO: 8.7 (38%)\par 6MWT: She declined 6MWT today.\par Severe obstructive lung defect. Severely decreased diffusion capacity. \par \par EXAM: CT CHEST  PROCEDURE DATE: 2019 \par COMPARISON: CT chest 2017. \par Lungs and large airways: Again seen is diffuse centrilobular and paraseptal emphysema. \par There has been a decrease in size of previously seen 0.4 mm left upper lobe nodule, now measuring 3 mm (series 4 image 96). \par There is a 4 mm nodule in the superior portion of the left lower lobe (series 4 image 186). \par Pleura: No pleural effusion. \par Mediastinum and hilar regions: No thoracic lymphadenopathy. \par Heart and pericardium: Heart size is normal. No pericardial effusion. Severe coronary artery disease. \par Vessels: Mild calcified plaque aorta. Again seen is enlargement of the main pulmonary artery measuring 3.8 cm. \par Chest wall and lower neck: Normal. \par Upper abdomen: Status post cholecystectomy. Subcentimeter hypodensity in right lobe of liver too small to characterize although likely representing small cyst. \par Bones: Mild degenerative changes. \par IMPRESSION: \par 1. Diffuse emphysematous changes. \par 2. One or two micronodules and nodules (measuring <6mm), stable from 2017. Given risk factors 12 months follow-up low dose CT screening is recommended. \par 3. No change in dilation of the main pulmonary artery which could be a sequela pulmonary hypertension. \par \par Abhinav, DLCO, 6MWT 18:\par FVC: 2.61 L (93%)\par FEV1: 1.22 L (56%)\par FEV1/FVC: 47%\par ZDB70-31%: 0.39 L/s (19%)\par DLCO: 7.5 (32%)\par 6MWT: 240 meters, SpO2 start; 95% --> spO2 end: 85%\par Moderately severe obstructive defect. Severely reduced diffusion capacity.  6MWT was aborted at 4 minutes due to desaturation; pt also notes walk distance was limited secondary to knee pain. Reduced walk distance with significant desaturation. \par \par EXAM:  ECHOCARDIOGRAM (CARDIOL) 2018  \par Normal left ventricular size and wall thickness.The left ventricular wall  motion is normal.The left ventricular ejection fraction is normal.The left ventricular ejection fraction is 60%.The right ventricle is normal in size and function.The left atrial size is normal.Right atrial size is normal.Structurally normal aortic valve.No aortic regurgitation noted.Mildly calcified anterior leaflet of the mitral valve.There is mild to moderate mitral regurgitation.Structurally normal tricuspid valve.There is trace to mild tricuspid regurgitation.There is mild pulmonary hypertension.The pulmonary artery systolic pressure is estimated to be 40 mmHg.Structurally normal pulmonic valve.There is trace pulmonic regurgitation.There is no\par  pericardial effusion.There is no significant change when compared to the echo from 2017.\par \par Spirometry\par FVC: 2.83 100%\par FEV1: 1.19 54%\par FEV1/FVC: 42%\par DLCO: 31%\par \par AAT: WNL (117)\par \par Echo: 17: EF 55-60%, mild PHTN with PASP 40mmHg\par \par Spirometry and DLCO 17:\par FVC: 2.71 L (95% pred) --> 2.58 L (90% pred)\par FEV1: 1.20 L (54% pred) --> 1.14 L (51% pred)\par FEV1/FVC: 44% --> 44%\par COG01-58%: 0.30 L/s (14% pred) --> 0.35 L/s (16% pred)\par DLCO: 8.0 (32% pred)\par PI max 17: 26ekF1A (55% pred)\par 6MWT: 382m, SpO2 start: 95% --> SpO2 end: 88%\par Severe obstruction. Severely reduced diffusion capacity. Vastly improved walk distance (about 300m more than last) and improvement in degree of desaturation (but still with significant desaturation). \par \par PFT 3/28/17:\par FVC: 2.39 L (83% pred) --> 2.22 L (77% pred)\par FEV1: 1.14 L (50% pred) --> 1.09 L (48% pred)\par FEV1/FVC: 48% --> 49%\par OUR51-69%: 0.42 L/s (19% pred) --> 0.40 L/s (18% pred)\par T.84 L (92% pred)\par RV/T%\par DLCO: 8.1 (33% pred)\par 6MWT 3/28/17: Pt declined due to pain from heel spur\par Severe obstructive defect. Normal lung volumes. Very severely reduced DLCO.  Air trapping. \par \par Abhinav with bronchodilator 16:\par FVC: 2.34 L (83% pred) --> 2.67 L (94% pred)\par FEV1: 1.07 L (48% pred) --> 1.24 L (56% pred)\par FEV1/FVC: 46% --> 46%\par XQT73-60%: 0.38 L/s (18% pred) --> 0.41 L/s (19% pred)\par 330cc increase in FVC post-bronchodilator. Moderate obstruction. \par \par CXR 16: pt somewhat rotated, haziness in the LLL which is also seen on the lateral film, cannot rule out pneumonia of the LLL\par \par Spirometry 16:\par FVC: 2.71 L (94% pred)\par FEv1: 1.23 L (55% pred)\par FEV1/FVC: 46%\par UJL37-71%: 0.42 L/s (19% pred)\par DLCO 16: 9.3 (33% pred <-- 29%)\par 6MWT 16: Walk time: 2:00, 86 meters, SpO2 start: 89% --> SpO2 end: 82%\par Improvement in FEV1 and FVC as compared to prior test.  Severe obstructive lung disease. Severely reduced diffusion capacity. Reduced walk distance but test aborted early due to desaturation.  Degree of desaturation improved since previous test. \par \par PFTs 16\par FVC: 1.71 (59%)\par FEV1: 0.98 (44%)\par FEV1/FVC 58%\par FEF25/75: 0.59 (27%)\par T.38 (83%)\par DLCO: 7.3 (29%)\par Severe obstructive lung disease with severe reduced DLCO. No significant bronchodilator response\par \par PFTs 16\par FVC: 2.57 89%\par FEV1: 1.19 52%\par FEV1/FVC: 46%\par DLCO 37%\par 6MWT: 86 meters, Baseline: 88% End: 74%\par \par PFTs 12/17/15\par FVC: 2.73 (94% of pred)\par FEV1: 1.31 l (57% of pred)\par FEV1/FVC: 48%\par XKL66-33%: 0.36 L/s (59% of pred)\par %\par DLCO 43%\par Pt declining 6MWT today \par \par Spirometry:\par FVC: 2.46 L (86% of pred)\par FEV1: 1.26 l (56% of pred)\par FEV1/FVC: 51.2%\par OKP73-11%: 0.64 L/s (29% of pred)\par C/w moderate obstructive pulmonary disease.\par \par RHC 10/13/15:\par -Pulmonary capillary occlusion pressure: 17 mm of Hg\par -Pulmonary artery systolic: 40 mmHg\par -Pulmonary artery diastolic: 20mm Hg\par -Mean pulmonary artery pressure: 29 mm Hg\par -Right atrial pressure: 12mm Hg\par -Cardiac output was 4.43\par Based upon these numbers, the pulmonary artery diastolic and the wedge gradient is normal at 3. The trans-pulmonary pressure gradient is mildly elevated at 12. With a cardiac output of 4.43 L per minute, the pulmonary vascular resistance is normal at slightly less than 3 Woods units.  There is no increase in intrinsic pulmonary vascular resistance. The pulmonary artery pressures are also not elevated out of proportion to the left-sided pressures therefore the patient is not a candidate for advanced vasodilator therapy.\par \par TTE 12/19/15:\par LVH, nomal LVEF PA sys press 34mmHg, mild MR & TR\par \par CT chest CTAPE 08/10/15\par IMPRESSION: No evidence for pulmonary emboli. No significant change in moderate emphysematous changes. No significant change in main pulmonary arterial dilatation, possibly reflecting sequelae of pulmonary arterial hypertension.

## 2021-11-02 NOTE — ASSESSMENT
[FreeTextEntry1] : 10-28-21:\par It was a pleasure to see Marce in follow-up today. Her respiratory issues are summarized:\par \par 1. 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 This was done since she has extensive emphysema B/L in both upper and lower lobes. She is using Breo and Incruse due to her reduced inspiratory flows. Additionally, since she was having frequent exacerbations of COPD in the past, she was started on Daliresp, which has reduced the frequency of AECOPD. She has not had a COPD exacerbation or hospitalization since starting Daliresp. Diffuse emphysematous changes were visible on CT chest.\par \par She has been evaluated by Dr. Jose Arzate at the [facility]. \par \par Repeat PFT done on 12/7/20 shows severe obstructive defect FVC: 2.58 L (94%), FEV1: 1.06 L (50%), FEV1/FVC: 41% and severely reduced diffusion capacity (32%). Lung volumes were normal (92%). Compared to August 2019 and April 2018 PFTs, her spirometry and diffusion capacity are stable. On 6MWT done 12/7/20, she walked 299 meters. Resting O2 saturation was 95% on RA. Patient dropped to 85% on room air at 2 minutes. Patient given 2L 02 via nasal cannula. End test O2 saturation was 89% on 2 liters O2. On prior 6MWT, Esther walked 240 meters.\par \par She is using the Xopenex about 3 times per week. She continues on Breo 200-25, Daliresp and Incruse. Her exercise tolerance is now stable at 1-2 blocks. Using oxygen 2 L/min intermittently throughout the day for SOB or when she is exerting herself (e.g. house chores). \par \par Chest x-ray on 8/5/21 (PA film) shows no evidence of new opacities. There is no pulmonary vascular congestion. Costophrenic angle on the left side is attenuated, however this is also seen on the film from 11/2019. 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 The appearance of such changes are with a slightly thicker 'wall' as would be expected in emphysema, and is reminiscent of cyst-like structures, raising concern for conditions such as Pulmonary Langerhans Cell Histiocytosis\par \par \par Plan:\par - Continue Breo, Incruse, Daliresp and PRN Xopenex\par - No need to add on Azithromycin, as she has remained without exacerbations with the initiation of Daliresp \par - We have reached out to Dr. Yrn Trivedi from  regarding Zephr valve evaluation for lung reduction procedure, however her PFTs and 6-MWT do not meet the criteria for consideration \par - We will ask Dr. Ernesto Sin (Lung Transplant) to establish care with her here at [facility] \par - Repeat CT Chest \par \par \par 2. 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 It is currently unclear the necessity of treating such a NTB, and given the improvement in her respiratory symptoms without such treatment, it becomes even less necessary \par \par Plan:\par - We will repeat her AFB Sputum cultures today and assess for the re-growth of such species \par \par \par 3. Dyspnea on exertion\par Her symptoms are much improved and she is currently back to her baseline exercise tolerance. \par Ultrasound performed 9/29/20 showed B-lines at bases w/ curtain sign noted. No evidence of pleural effusion on right or left side. Diaphragmatic movement normal. Autoimmune serologies were done on 9/29/20. Double stranded DNA Ab was positive (83), but C4, Butler ab, RNP ab, YOMI, CH50, C3, CCP ab, BNP, RF, ESR, d-dimer were negative. Her Hgb was normal on CBC. \par \par Plan:\par - Plan as above \par \par \par 4. Pulmonary hypertension and hypoxemic respiratory failure\par Marce likely falls in the 10% of patients with COPD who develop pulmonary hypertension. She tried Adcirca in the past but was unable to tolerate it due to headaches. She is currently on Sildenafil 40 mg in the AM, 20 mg in the afternoon, and 40 mg HS. Tolerating medication well. In the past, she had chest pressure on 40mg TID.\par \par She had right heart catheterization in Dr. Vasquez's office. PA pressures 38/16 with mean 26. Pulmonary capillary occlusion pressure 14.0 Cardiac output 5.8. Diastolic gradient 2.0. Transpulmonary pressure gradient 12.0 (both normal). PVR (12/5.8) 2.0. Based upon the right heart catheterization findings, there is no evidence of precapillary pulmonary hypertension at rest. Although, because this was an office bedside echocardiogram, we likely won't be able to measure right-sided pressures which is what we are most interested in. \par \par Plan:\par - Continue Sildenafil 40 mg in the AM, 20 mg in the afternoon, and 40 mg HS\par - We will continue to monitor her cardiopulmonary symptoms \par \par \par 5. GEO\par Marce has GEO and is using CPAP\par \par 6. Pulmonary nodules \par  The micro-nodules appear stable on 8/17/19 CT and do not require further follow-up. \par \par 7. Allergic rhinitis\par Marce has been evaluated by her ENT in May 2020, she was given an abx for sinusitis and she was started on Flonase. She can continue Dymista BID and Budosenide BID nasal rinse.\par \par 8. Health maintenance\par Marce was administered Prevnar (13 valent) in May 2019. Pneumovax was administered on 8/5/21.

## 2021-11-02 NOTE — DISCUSSION/SUMMARY
[FreeTextEntry1] : Attending's Summary:\par 10-28-21:\par -no pallor, no icterus\par -no cervical or supraclavicular adenopathy, no JVD at 45 degrees, no clinically detected hepatosplenomegaly, no hepatojugular reflux \par -no dullness, good air entry bilaterally, mildly diminished BS in R infra- and supra- scapular area\par -Normal S1/S2, no murmurs, rubs, gallops\par -no articular manifestations, no skin thickening on dorsum of hands, no visible rash, no palmar erythema, grade 1 clubbing\par -radial pulses equal bilaterally\par -no pedal edema

## 2021-11-02 NOTE — REVIEW OF SYSTEMS
[Dyspnea] : dyspnea [Negative] : Musculoskeletal [Fever] : no fever [Cough] : no cough [Chest Tightness] : no chest tightness [Wheezing] : no wheezing [Chest Discomfort] : no chest discomfort [Edema] : no edema [Palpitations] : no palpitations

## 2021-11-02 NOTE — PHYSICAL EXAM
[TextBox_68] : no cervical or supraclavicular adenopathy, no JVD at 45 degrees, no hepatojugular reflux \par no dullness, good air entry, inspiratory crackles left base 1/4 way up

## 2021-11-03 ENCOUNTER — NON-APPOINTMENT (OUTPATIENT)
Age: 66
End: 2021-11-03

## 2021-11-07 ENCOUNTER — NON-APPOINTMENT (OUTPATIENT)
Age: 66
End: 2021-11-07

## 2021-11-11 ENCOUNTER — NON-APPOINTMENT (OUTPATIENT)
Age: 66
End: 2021-11-11

## 2021-11-21 ENCOUNTER — NON-APPOINTMENT (OUTPATIENT)
Age: 66
End: 2021-11-21

## 2021-11-26 ENCOUNTER — NON-APPOINTMENT (OUTPATIENT)
Age: 66
End: 2021-11-26

## 2021-11-28 ENCOUNTER — NON-APPOINTMENT (OUTPATIENT)
Age: 66
End: 2021-11-28

## 2021-11-28 LAB — FUNGUS SPT CULT: NORMAL

## 2021-12-02 ENCOUNTER — NON-APPOINTMENT (OUTPATIENT)
Age: 66
End: 2021-12-02

## 2022-01-03 ENCOUNTER — APPOINTMENT (OUTPATIENT)
Dept: PULMONOLOGY | Facility: CLINIC | Age: 67
End: 2022-01-03

## 2022-01-04 ENCOUNTER — APPOINTMENT (OUTPATIENT)
Dept: PULMONOLOGY | Facility: CLINIC | Age: 67
End: 2022-01-04

## 2022-01-05 ENCOUNTER — NON-APPOINTMENT (OUTPATIENT)
Age: 67
End: 2022-01-05

## 2022-01-10 ENCOUNTER — APPOINTMENT (OUTPATIENT)
Dept: PULMONOLOGY | Facility: CLINIC | Age: 67
End: 2022-01-10

## 2022-01-27 ENCOUNTER — APPOINTMENT (OUTPATIENT)
Dept: CT IMAGING | Facility: HOSPITAL | Age: 67
End: 2022-01-27

## 2022-01-27 ENCOUNTER — APPOINTMENT (OUTPATIENT)
Dept: PULMONOLOGY | Facility: CLINIC | Age: 67
End: 2022-01-27
Payer: MEDICARE

## 2022-01-27 PROCEDURE — 99443: CPT | Mod: 95

## 2022-01-27 NOTE — HISTORY OF PRESENT ILLNESS
[TextBox_4] : \par 65 yo AAF with Asthma/COPD presents for follow-up. Pt with diffuse centrilobular emphysema on triple therapy and daliresp, pulmonary HTN with PAS 43mmHg on RHC on sildenafil, stable pulmonary nodules, and severe exercise restriction\par \par 1-27-22:\par She tested positive for COVID a week ago. She has weakness, headache. She has been having chest tightness\par Her sputum from 8/5/21 is growing mycobacterium parascrofulaceum. \par Continues on Breo 200-25, Dailiresp and Incruse. She using Xopenex less often.\par She is needing the Xopenex 3 times a day for chest tightness and shortness of breath. Normally she uses it like once a day. Feels her asthma is acting up lately with weather changes. \par Continues on sildenafil (40, 20, 40 mg). Tolerating medication well. \par Had echo with Dr. Vasquez and cardiac cath on 8/24/20. \par On CPAP for GEO & using throughout the night.\par No fevers or chills.  Denies chest pain or palpitations.

## 2022-01-27 NOTE — REASON FOR VISIT
[Follow-Up] : a follow-up visit [Sleep Apnea] : sleep apnea [COPD] : COPD [Pulmonary Hypertension] : pulmonary hypertension [Pulmonary Nodules] : pulmonary nodules [Home] : at home, [unfilled] , at the time of the visit. [Medical Office: ([facility])___] : at the medical office located in  [Verbal consent obtained from patient] : the patient, [unfilled]

## 2022-01-27 NOTE — ASSESSMENT
[FreeTextEntry1] : 1-27-22:\par It was a pleasure to speak to Marce in follow-up today. Her respiratory issues are summarized:\par \par 1. 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 This was done since she has extensive emphysema B/L in both upper and lower lobes. She is using Breo and Incruse due to her reduced inspiratory flows. Additionally, since she was having frequent exacerbations of COPD in the past, she was started on Daliresp, which has reduced the frequency of AECOPD. She has not had a COPD exacerbation or hospitalization since starting Daliresp. Diffuse emphysematous changes were visible on CT chest.\par \par She has been evaluated by Dr. Jose Arzate at the [facility]. \par \par Repeat PFT done on 12/7/20 shows severe obstructive defect FVC: 2.58 L (94%), FEV1: 1.06 L (50%), FEV1/FVC: 41% and severely reduced diffusion capacity (32%). Lung volumes were normal (92%). Compared to August 2019 and April 2018 PFTs, her spirometry and diffusion capacity are stable. On 6MWT done 12/7/20, she walked 299 meters. Resting O2 saturation was 95% on RA. Patient dropped to 85% on room air at 2 minutes. Patient given 2L 02 via nasal cannula. End test O2 saturation was 89% on 2 liters O2. On prior 6MWT, Marce walked 240 meters.\par \par She is using the Xopenex about 3 times per week. She continues on Breo 200-25, Daliresp and Incruse. Her exercise tolerance is now stable at 1-2 blocks. Using oxygen 2 L/min intermittently throughout the day for SOB or when she is exerting herself (e.g. house chores). \par \par Chest x-ray on 8/5/21 (PA film) shows no evidence of new opacities. There is no pulmonary vascular congestion. Costophrenic angle on the left side is attenuated, however this is also seen on the film from 11/2019. 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 The appearance of such changes are with a slightly thicker 'wall' as would be expected in emphysema, and is reminiscent of cyst-like structures, raising concern for conditions such as Pulmonary Langerhans Cell Histiocytosis\par \par Plan:\par - Continue Breo, Incruse, Daliresp and PRN Xopenex\par - No need to add on Azithromycin, as she has remained without exacerbations with the initiation of Daliresp \par - We have reached out to Dr. Yrn Trivedi from  regarding Zephr valve evaluation for lung reduction procedure, however her PFTs and 6-MWT do not meet the criteria for consideration \par - She has an appointment with Dr. Marleny Nettles / Dr. Ernesto Sin (Lung Transplant) to establish care here at [facility]\par - We will hold off on repeating CT Chest for now given recent positive COVID-19 infection\par \par 2. 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 It is currently unclear the necessity of treating such a NTB, and given the improvement in her respiratory symptoms without such treatment, it becomes even less necessary. Repeat sputum culture on 10/28/21 is growing AFB, pending identification.\par \par Plan:\par - We will continue to follow sputum cultures here.\par \par 3. Dyspnea on exertion\par Her symptoms are much improved and she is currently back to her baseline exercise tolerance. \par Ultrasound performed 9/29/20 showed B-lines at bases w/ curtain sign noted. No evidence of pleural effusion on right or left side. Diaphragmatic movement normal. Autoimmune serologies were done on 9/29/20. Double stranded DNA Ab was positive (83), but C4, Butler ab, RNP ab, YOMI, CH50, C3, CCP ab, BNP, RF, ESR, d-dimer were negative. Her Hgb was normal on CBC. \par \par Plan:\par - Plan as above \par \par 4. Pulmonary hypertension and hypoxemic respiratory failure\par Marce likely falls in the 10% of patients with COPD who develop pulmonary hypertension. She tried Adcirca in the past but was unable to tolerate it due to headaches. She is currently on Sildenafil 40 mg in the AM, 20 mg in the afternoon, and 40 mg HS. Tolerating medication well. In the past, she had chest pressure on 40mg TID.\par \par She had right heart catheterization in Dr. Vasquez's office. PA pressures 38/16 with mean 26. Pulmonary capillary occlusion pressure 14.0 Cardiac output 5.8. Diastolic gradient 2.0. Transpulmonary pressure gradient 12.0 (both normal). PVR (12/5.8) 2.0. Based upon the right heart catheterization findings, there is no evidence of precapillary pulmonary hypertension at rest. Although, because this was an office bedside echocardiogram, we likely won't be able to measure right-sided pressures which is what we are most interested in. \par \par Plan:\par - Continue Sildenafil 40 mg in the AM, 20 mg in the afternoon, and 40 mg HS\par - We will continue to monitor her cardiopulmonary symptoms \par \par 5. GEO\par Marce has GEO and is using CPAP\par \par 6. Pulmonary nodules \par  The micro-nodules appear stable on 8/17/19 CT and do not require further follow-up. \par \par 7. Allergic rhinitis\par Marce has been evaluated by her ENT in May 2020, she was given an abx for sinusitis and she was started on Flonase. She can continue Dymista BID and Budesonide BID nasal rinse.\par \par 8. COVID-19 infection\par She tested positive for COVID a week ago. She has weakness, headache. She has been having chest tightness, but is resting at home. She has not required hospitalization and her oxygen saturation is not worsening. She should continue to monitor her O2 saturation closely and if she has any worsening shortness of breath, she will call.\par \par 9. Health maintenance\par Marce was administered Prevnar (13 valent) in May 2019. Pneumovax was administered on 8/5/21.\par \par Return to clinic: 1 month ([facility])

## 2022-01-27 NOTE — PROCEDURE
[FreeTextEntry1] : PFT 20\par FVC: 2.58 L (94%)--> 2.49 L (91%)  \par FEV1: 1.06 L (50%)--> 1.06 L (50%)  \par FEV1/FVC: 41%--> 42%\par NPB80-31%: 0.31 L/s (16%)--> 0.32 L/s (16%)\par T.83 L (92%)\par RV/T%\par DLCO: 6.9 (32%)\par \par 6MWT 20\par Patient walked 299 meters during a 6 minute walk test.\par Resting O2 saturation was 95% on RA.  Heart rate 76 bpm. Patient dropped to 85% on room air at 2 minutes. Patient given 2L 02 via nasal cannula.\par End test O2 saturation was 89% on 2 liters O2.  Heart rate 102 bpm.  Ivis scale end of test : 0\par This signifies reduced walk distance and significant desaturation\par \par Ultrasound 20\par - B-lines observed curtain sign noted. No evidence of pleural effusion. Diaphragmatic movement normal. \par \par Cardiac cath 20\par PA pressures 38/16 with mean 26. Pulmonary capillary occlusion pressure 14.0 Cardiac output 5.8. Diastolic gradient 2.0. Transpulmonary pressure gradient 12.0 (both normal). PVR (12/5.8) 2.0.\par \par Abhinav, DLCO 19:\par FVC: 2.71 L (85%)\par FEV1: 1.26 L (50%)\par FEV1/FVC: 47%\par PPV11-75%: 0.45 L/s (17%)\par DLCO: 8.7 (38%)\par 6MWT: She declined 6MWT today.\par Severe obstructive lung defect. Severely decreased diffusion capacity. \par \par EXAM: CT CHEST  PROCEDURE DATE: 2019 \par COMPARISON: CT chest 2017. \par Lungs and large airways: Again seen is diffuse centrilobular and paraseptal emphysema. \par There has been a decrease in size of previously seen 0.4 mm left upper lobe nodule, now measuring 3 mm (series 4 image 96). \par There is a 4 mm nodule in the superior portion of the left lower lobe (series 4 image 186). \par Pleura: No pleural effusion. \par Mediastinum and hilar regions: No thoracic lymphadenopathy. \par Heart and pericardium: Heart size is normal. No pericardial effusion. Severe coronary artery disease. \par Vessels: Mild calcified plaque aorta. Again seen is enlargement of the main pulmonary artery measuring 3.8 cm. \par Chest wall and lower neck: Normal. \par Upper abdomen: Status post cholecystectomy. Subcentimeter hypodensity in right lobe of liver too small to characterize although likely representing small cyst. \par Bones: Mild degenerative changes. \par IMPRESSION: \par 1. Diffuse emphysematous changes. \par 2. One or two micronodules and nodules (measuring <6mm), stable from 2017. Given risk factors 12 months follow-up low dose CT screening is recommended. \par 3. No change in dilation of the main pulmonary artery which could be a sequela pulmonary hypertension. \par \par Abhinav, DLCO, 6MWT 18:\par FVC: 2.61 L (93%)\par FEV1: 1.22 L (56%)\par FEV1/FVC: 47%\par [id number]%: 0.39 L/s (19%)\par DLCO: 7.5 (32%)\par 6MWT: 240 meters, SpO2 start; 95% --> spO2 end: 85%\par Moderately severe obstructive defect. Severely reduced diffusion capacity.  6MWT was aborted at 4 minutes due to desaturation; pt also notes walk distance was limited secondary to knee pain. Reduced walk distance with significant desaturation. \par \par EXAM:  ECHOCARDIOGRAM (CARDIOL) 2018  \par Normal left ventricular size and wall thickness.The left ventricular wall  motion is normal.The left ventricular ejection fraction is normal.The left ventricular ejection fraction is 60%.The right ventricle is normal in size and function.The left atrial size is normal.Right atrial size is normal.Structurally normal aortic valve.No aortic regurgitation noted.Mildly calcified anterior leaflet of the mitral valve.There is mild to moderate mitral regurgitation.Structurally normal tricuspid valve.There is trace to mild tricuspid regurgitation.There is mild pulmonary hypertension.The pulmonary artery systolic pressure is estimated to be 40 mmHg.Structurally normal pulmonic valve.There is trace pulmonic regurgitation.There is no\par  pericardial effusion.There is no significant change when compared to the echo from 2017.\par \par Spirometry\par FVC: 2.83 100%\par FEV1: 1.19 54%\par FEV1/FVC: 42%\par DLCO: 31%\par \par AAT: WNL (117)\par \par Echo: 17: EF 55-60%, mild PHTN with PASP 40mmHg\par \par Spirometry and DLCO 17:\par FVC: 2.71 L (95% pred) --> 2.58 L (90% pred)\par FEV1: 1.20 L (54% pred) --> 1.14 L (51% pred)\par FEV1/FVC: 44% --> 44%\par GCC05-07%: 0.30 L/s (14% pred) --> 0.35 L/s (16% pred)\par DLCO: 8.0 (32% pred)\par PI max 17: [id number] (55% pred)\par 6MWT: 382m, SpO2 start: 95% --> SpO2 end: 88%\par Severe obstruction. Severely reduced diffusion capacity. Vastly improved walk distance (about 300m more than last) and improvement in degree of desaturation (but still with significant desaturation). \par \par PFT 3/28/17:\par FVC: 2.39 L (83% pred) --> 2.22 L (77% pred)\par FEV1: 1.14 L (50% pred) --> 1.09 L (48% pred)\par FEV1/FVC: 48% --> 49%\par [id number]%: 0.42 L/s (19% pred) --> 0.40 L/s (18% pred)\par T.84 L (92% pred)\par RV/T%\par DLCO: 8.1 (33% pred)\par 6MWT 3/28/17: Pt declined due to pain from heel spur\par Severe obstructive defect. Normal lung volumes. Very severely reduced DLCO.  Air trapping. \par \par Abhinav with bronchodilator 16:\par FVC: 2.34 L (83% pred) --> 2.67 L (94% pred)\par FEV1: 1.07 L (48% pred) --> 1.24 L (56% pred)\par FEV1/FVC: 46% --> 46%\par [id number]%: 0.38 L/s (18% pred) --> 0.41 L/s (19% pred)\par 330cc increase in FVC post-bronchodilator. Moderate obstruction. \par \par CXR 16: pt somewhat rotated, haziness in the LLL which is also seen on the lateral film, cannot rule out pneumonia of the LLL\par \par Spirometry 16:\par FVC: 2.71 L (94% pred)\par FEv1: 1.23 L (55% pred)\par FEV1/FVC: 46%\par [id number]%: 0.42 L/s (19% pred)\par DLCO 16: 9.3 (33% pred <-- 29%)\par 6MWT 16: Walk time: 2:00, 86 meters, SpO2 start: 89% --> SpO2 end: 82%\par Improvement in FEV1 and FVC as compared to prior test.  Severe obstructive lung disease. Severely reduced diffusion capacity. Reduced walk distance but test aborted early due to desaturation.  Degree of desaturation improved since previous test. \par \par PFTs 16\par FVC: 1.71 (59%)\par FEV1: 0.98 (44%)\par FEV1/FVC 58%\par FEF25/75: 0.59 (27%)\par T.38 (83%)\par DLCO: 7.3 (29%)\par Severe obstructive lung disease with severe reduced DLCO. No significant bronchodilator response\par \par PFTs 16\par FVC: 2.57 89%\par FEV1: 1.19 52%\par FEV1/FVC: 46%\par DLCO 37%\par 6MWT: 86 meters, Baseline: 88% End: 74%\par \par PFTs 12/17/15\par FVC: 2.73 (94% of pred)\par FEV1: 1.31 l (57% of pred)\par FEV1/FVC: 48%\par [id number]%: 0.36 L/s (59% of pred)\par %\par DLCO 43%\par Pt declining 6MWT today \par \par Spirometry:\par FVC: 2.46 L (86% of pred)\par FEV1: 1.26 l (56% of pred)\par FEV1/FVC: 51.2%\par [id number]%: 0.64 L/s (29% of pred)\par C/w moderate obstructive pulmonary disease.\par \par RHC 10/13/15:\par -Pulmonary capillary occlusion pressure: 17 mm of Hg\par -Pulmonary artery systolic: 40 mmHg\par -Pulmonary artery diastolic: 20mm Hg\par -Mean pulmonary artery pressure: 29 mm Hg\par -Right atrial pressure: 12mm Hg\par -Cardiac output was 4.43\par Based upon these numbers, the pulmonary artery diastolic and the wedge gradient is normal at 3. The trans-pulmonary pressure gradient is mildly elevated at 12. With a cardiac output of 4.43 L per minute, the pulmonary vascular resistance is normal at slightly less than 3 Woods units.  There is no increase in intrinsic pulmonary vascular resistance. The pulmonary artery pressures are also not elevated out of proportion to the left-sided pressures therefore the patient is not a candidate for advanced vasodilator therapy.\par \par TTE 12/19/15:\par LVH, nomal LVEF PA sys press 34mmHg, mild MR & TR\par \par CT chest CTAPE 08/10/15\par IMPRESSION: No evidence for pulmonary emboli. No significant change in moderate emphysematous changes. No significant change in main pulmonary arterial dilatation, possibly reflecting sequelae of pulmonary arterial hypertension.

## 2022-01-27 NOTE — REVIEW OF SYSTEMS
[Dyspnea] : dyspnea [SOB on Exertion] : sob on exertion [Hay Fever] : hay fever [Watery Eyes] : watery eyes [Itchy Eyes] : itchy eyes [Nasal Discharge] : nasal discharge [Arthralgias] : arthralgias [Negative] : Endocrine [Fever] : no fever [Cough] : no cough [Chest Tightness] : no chest tightness [Wheezing] : no wheezing [Chest Discomfort] : no chest discomfort [Edema] : no edema [Palpitations] : no palpitations

## 2022-01-31 ENCOUNTER — APPOINTMENT (OUTPATIENT)
Dept: PULMONOLOGY | Facility: CLINIC | Age: 67
End: 2022-01-31
Payer: MEDICARE

## 2022-01-31 ENCOUNTER — APPOINTMENT (OUTPATIENT)
Dept: PULMONOLOGY | Facility: CLINIC | Age: 67
End: 2022-01-31

## 2022-01-31 DIAGNOSIS — Z01.818 ENCOUNTER FOR OTHER PREPROCEDURAL EXAMINATION: ICD-10-CM

## 2022-01-31 PROCEDURE — 99443: CPT | Mod: 95

## 2022-02-01 PROBLEM — Z01.818 PRE-TRANSPLANT EVALUATION FOR LUNG TRANSPLANT: Status: ACTIVE | Noted: 2021-12-01

## 2022-02-01 NOTE — HISTORY OF PRESENT ILLNESS
[Home] : at home, [unfilled] , at the time of the visit. [Medical Office: ([facility])___] : at the medical office located in  [Verbal consent obtained from patient] : the patient, [unfilled] [TextBox_4] : Ms. 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 Previously been evaluated by Dr. Jose Arzate at the [facility]. Now referred by Dr Epps for lung transplant evaluation. Feels like her quality of life is ok. Does have dyspnea with climbing up the stairs or walking outside but does not use oxygen at all times.  Performs all her ADLs independently. She has identified her mother as her \par Oxygen requirements: 2 iters/min upon exertion\par Oxygen requirements: does not use oxygen at rest\par \par CPAP yes for GEO \par Pulse Ox: 89 on RA  with exertion \par Pulse Ox: 95 at rest\par \par Exercise tolerance: Can walk 0.5 block to 1 block without oxygen \par Functional Status: performs activities of daily living with NO assistance\par \par Quality of life: Feels comfortable with her quality of life\par Attends pulm rehab: has attended in the past \par \par Sig PMHX: a.fib  \par Sig PSHX: cholecystectomy \par Sig FMHX: none \par \par Social History: \par Exposure History: pet dog. no other exposure\par Smoking history: 1/2 ppd x 4 years. significant second hand smoke exposure. \par \par Health Care Maintenance:\par Covid 19 vaccine received including booster - 3rd shot in November 2021 \par Uptodate on flu, pneumonia and shingles vaccine \par \par \par

## 2022-02-01 NOTE — ASSESSMENT
[FreeTextEntry1] : Data Reviewed: \par PFT 20\par FVC: 2.58 L (94%)--> 2.49 L (91%) \par FEV1: 1.06 L (50%)--> 1.06 L (50%) \par FEV1/FVC: 41%--> 42%\par AHL09-14%: 0.31 L/s (16%)--> 0.32 L/s (16%)\par T.83 L (92%)\par RV/T%\par DLCO: 6.9 (32%)\par \par 6MWT 20\par Patient walked 299 meters during a 6 minute walk test.\par Resting O2 saturation was 95% on RA. Heart rate 76 bpm. Patient dropped to 85% on room air at 2 minutes. Patient given 2L 02 via nasal cannula.\par End test O2 saturation was 89% on 2 liters O2. Heart rate 102 bpm. Ivis scale end of test : 0\par This signifies reduced walk distance and significant desaturation\par \par EXAM: CT CHEST PROCEDURE DATE: 2019 \par COMPARISON: CT chest 2017. \par IMPRESSION: \par 1. Diffuse emphysematous changes. \par 2. One or two micronodules and nodules (measuring <6mm), stable from . Given risk factors 12 months follow-up low dose CT screening is recommended. \par 3. No change in dilation of the main pulmonary artery which could be a sequela pulmonary hypertension. \par \par Stress tests:\par 21: exercise stress echo: no ischemia at 82% MPHR, PASP 41 mmHg to 84 mmHg post exercise, 5.5 METS. \par \par Cath:\par 20: mild diffuse coronary atherosclerosis. \par 10/15/15: RHC: CO 4.43, PVI 3.27, PA 43, PAOP , RA \par \par Echo:\par 19: EF 55%, PASP 45 mmHg. \par \par \par ASSESSMENT/PLAN:\par 1. Chronic respiratory failure requiring supplemental oxygen with exertion 2/2 emphysema \par  2. Pre transplant evaluation \par \par - Continue Breo, Incruse, Daliresp and PRN Xopenex\par - patient's case has been previously discussed with Dr. Yrn Trivedi from  regarding Zephr valve evaluation for lung reduction procedure, however her PFTs and 6-MWT do not meet the criteria for consideration \par - Will need repeat PFTs, 6MWT, CT chest when off isolation for COVID \par - follow up with Dr. 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 \par \par RTC in 1 month \par Discussed in detail with Dr. Sin. \par \par

## 2022-02-01 NOTE — REVIEW OF SYSTEMS
[Cough] : cough [SOB on Exertion] : sob on exertion [Obesity] : obesity [Fever] : no fever [Chills] : no chills [Chest Discomfort] : no chest discomfort [Orthopnea] : no orthopnea [GERD] : no gerd [Abdominal Pain] : no abdominal pain [Diarrhea] : no diarrhea [Headache] : no headache [Dizziness] : no dizziness

## 2022-02-06 PROBLEM — I25.10 CAD (CORONARY ARTERY DISEASE): Status: ACTIVE | Noted: 2021-08-31

## 2022-02-06 NOTE — ASSESSMENT
[FreeTextEntry1] : 1. Atrial fibrillation: [id number] 3\par      - continue systemic anticoagulation with Pradaxa 150mg po bid\par      - discussed with patient avoidance of ASA and NSAIDS as well as need for bleeding surveillance \par      - continue propafenone 150mg po qd\par      - will follow left ventricular systolic function with serial echocardiograms \par \par 2. HTN: BP at ACC/AHA 2017 guideline target \par      - continue losartan 100mg po qd\par \par 3. Dyslipidemia:\par      - switch simvastatin 20mg po qhs to atorvastatin 20mg po qd \par      - discussed therapeutic lifestyle changes to promote improved lipid metabolism\par      - check lipid profile next visit\par \par 4. COPD: + former smoker with resultant pulmonary HTN (group 3):\par      - follow up with pulmonary HTN expert, Larry Epps MD\par      - continue inhaled medications\par      - will follow with serial echocardiograms \par \par \par An ECG was obtained to evaluate heart rhythm and screen for any underlying cardiac abnormalities.  \par

## 2022-02-08 ENCOUNTER — APPOINTMENT (OUTPATIENT)
Dept: HEART AND VASCULAR | Facility: CLINIC | Age: 67
End: 2022-02-08

## 2022-02-08 DIAGNOSIS — I10 ESSENTIAL (PRIMARY) HYPERTENSION: ICD-10-CM

## 2022-02-08 DIAGNOSIS — I25.10 ATHEROSCLEROTIC HEART DISEASE OF NATIVE CORONARY ARTERY W/OUT ANGINA PECTORIS: ICD-10-CM

## 2022-02-08 DIAGNOSIS — E78.5 HYPERLIPIDEMIA, UNSPECIFIED: ICD-10-CM

## 2022-02-08 DIAGNOSIS — E66.9 OBESITY, UNSPECIFIED: ICD-10-CM

## 2022-02-08 DIAGNOSIS — I48.91 UNSPECIFIED ATRIAL FIBRILLATION: ICD-10-CM

## 2022-03-10 ENCOUNTER — NON-APPOINTMENT (OUTPATIENT)
Age: 67
End: 2022-03-10

## 2022-03-10 LAB — ACID FAST STN SPT: ABNORMAL

## 2022-03-23 ENCOUNTER — NON-APPOINTMENT (OUTPATIENT)
Age: 67
End: 2022-03-23

## 2022-03-26 ENCOUNTER — APPOINTMENT (OUTPATIENT)
Dept: CT IMAGING | Facility: CLINIC | Age: 67
End: 2022-03-26
Payer: MEDICARE

## 2022-03-26 ENCOUNTER — RESULT REVIEW (OUTPATIENT)
Age: 67
End: 2022-03-26

## 2022-03-26 ENCOUNTER — OUTPATIENT (OUTPATIENT)
Dept: OUTPATIENT SERVICES | Facility: HOSPITAL | Age: 67
LOS: 1 days | End: 2022-03-26

## 2022-03-26 PROCEDURE — G1004: CPT

## 2022-03-26 PROCEDURE — 71250 CT THORAX DX C-: CPT | Mod: 26,MG

## 2022-03-29 ENCOUNTER — TRANSCRIPTION ENCOUNTER (OUTPATIENT)
Age: 67
End: 2022-03-29

## 2022-03-31 ENCOUNTER — APPOINTMENT (OUTPATIENT)
Dept: PULMONOLOGY | Facility: CLINIC | Age: 67
End: 2022-03-31
Payer: MEDICARE

## 2022-03-31 VITALS
HEART RATE: 86 BPM | HEIGHT: 66 IN | WEIGHT: 180 LBS | DIASTOLIC BLOOD PRESSURE: 85 MMHG | TEMPERATURE: 97.7 F | BODY MASS INDEX: 28.93 KG/M2 | OXYGEN SATURATION: 90 % | RESPIRATION RATE: 12 BRPM | SYSTOLIC BLOOD PRESSURE: 130 MMHG

## 2022-03-31 PROCEDURE — 94729 DIFFUSING CAPACITY: CPT

## 2022-03-31 PROCEDURE — 94618 PULMONARY STRESS TESTING: CPT

## 2022-03-31 PROCEDURE — 94727 GAS DIL/WSHOT DETER LNG VOL: CPT

## 2022-03-31 PROCEDURE — 94010 BREATHING CAPACITY TEST: CPT

## 2022-03-31 PROCEDURE — 99215 OFFICE O/P EST HI 40 MIN: CPT

## 2022-03-31 NOTE — REVIEW OF SYSTEMS
[Dyspnea] : dyspnea [SOB on Exertion] : sob on exertion [Hay Fever] : hay fever [Watery Eyes] : watery eyes [Itchy Eyes] : itchy eyes [Nasal Discharge] : nasal discharge [Arthralgias] : arthralgias [Negative] : Endocrine [Back Pain] : back pain [Fever] : no fever [Cough] : no cough [Chest Tightness] : no chest tightness [Wheezing] : no wheezing [Chest Discomfort] : no chest discomfort [Edema] : no edema [Palpitations] : no palpitations [TextBox_94] : herniated disc

## 2022-03-31 NOTE — PROCEDURE
[FreeTextEntry1] : PFT 3/31/22\par FVC: 2.62 L (97%)\par FEV1: 1.05 L (50%)\par FEV1/FVC: 40%\par FEF 25-75%: 0.30 L/s (16%)\par T.69 L (89%)\par RV/T%\par DLCO: 6.95 (30%)\par \par 6MWT <date>\par Patient walked meters during a 6 minute walk test.\par Resting O2 saturation was % on RA.  Heart rate bpm.\par End test O2 saturation was % on RA.  Heart rate bpm.  Ivis scale end of test \par This signifies\par \par \par \par PFT 20\par FVC: 2.58 L (94%)--> 2.49 L (91%)  \par FEV1: 1.06 L (50%)--> 1.06 L (50%)  \par FEV1/FVC: 41%--> 42%\par PMQ74-03%: 0.31 L/s (16%)--> 0.32 L/s (16%)\par T.83 L (92%)\par RV/T%\par DLCO: 6.9 (32%)\par \par 6MWT 20\par Patient walked 299 meters during a 6 minute walk test.\par Resting O2 saturation was 95% on RA.  Heart rate 76 bpm. Patient dropped to 85% on room air at 2 minutes. Patient given 2L 02 via nasal cannula.\par End test O2 saturation was 89% on 2 liters O2.  Heart rate 102 bpm.  Ivis scale end of test : 0\par This signifies reduced walk distance and significant desaturation\par \par Ultrasound 20\par - B-lines observed curtain sign noted. No evidence of pleural effusion. Diaphragmatic movement normal. \par \par Cardiac cath 20\par PA pressures 38/16 with mean 26. Pulmonary capillary occlusion pressure 14.0 Cardiac output 5.8. Diastolic gradient 2.0. Transpulmonary pressure gradient 12.0 (both normal). PVR (5.8) 2.0.\par \par Abhinav, DLCO 19:\par FVC: 2.71 L (85%)\par FEV1: 1.26 L (50%)\par FEV1/FVC: 47%\par CEB02-58%: 0.45 L/s (17%)\par DLCO: 8.7 (38%)\par 6MWT: She declined 6MWT today.\par Severe obstructive lung defect. Severely decreased diffusion capacity. \par \par EXAM: CT CHEST  PROCEDURE DATE: 2019 \par COMPARISON: CT chest 2017. \par Lungs and large airways: Again seen is diffuse centrilobular and paraseptal emphysema. \par There has been a decrease in size of previously seen 0.4 mm left upper lobe nodule, now measuring 3 mm (series 4 image 96). \par There is a 4 mm nodule in the superior portion of the left lower lobe (series 4 image 186). \par Pleura: No pleural effusion. \par Mediastinum and hilar regions: No thoracic lymphadenopathy. \par Heart and pericardium: Heart size is normal. No pericardial effusion. Severe coronary artery disease. \par Vessels: Mild calcified plaque aorta. Again seen is enlargement of the main pulmonary artery measuring 3.8 cm. \par Chest wall and lower neck: Normal. \par Upper abdomen: Status post cholecystectomy. Subcentimeter hypodensity in right lobe of liver too small to characterize although likely representing small cyst. \par Bones: Mild degenerative changes. \par IMPRESSION: \par 1. Diffuse emphysematous changes. \par 2. One or two micronodules and nodules (measuring <6mm), stable from 2017. Given risk factors 12 months follow-up low dose CT screening is recommended. \par 3. No change in dilation of the main pulmonary artery which could be a sequela pulmonary hypertension. \par \par [place], DLCO, 6MWT 18:\par FVC: 2.61 L (93%)\par FEV1: 1.22 L (56%)\par FEV1/FVC: 47%\par IIF30-50%: 0.39 L/s (19%)\par DLCO: 7.5 (32%)\par 6MWT: 240 meters, SpO2 start; 95% --> spO2 end: 85%\par Moderately severe obstructive defect. Severely reduced diffusion capacity.  6MWT was aborted at 4 minutes due to desaturation; pt also notes walk distance was limited secondary to knee pain. Reduced walk distance with significant desaturation. \par \par EXAM:  ECHOCARDIOGRAM (CARDIOL) 2018  \par Normal left ventricular size and wall thickness.The left ventricular wall  motion is normal.The left ventricular ejection fraction is normal.The left ventricular ejection fraction is 60%.The right ventricle is normal in size and function.The left atrial size is normal.Right atrial size is normal.Structurally normal aortic valve.No aortic regurgitation noted.Mildly calcified anterior leaflet of the mitral valve.There is mild to moderate mitral regurgitation.Structurally normal tricuspid valve.There is trace to mild tricuspid regurgitation.There is mild pulmonary hypertension.The pulmonary artery systolic pressure is estimated to be 40 mmHg.Structurally normal pulmonic valve.There is trace pulmonic regurgitation.There is no\par  pericardial effusion.There is no significant change when compared to the echo from 2017.\par \par Spirometry\par FVC: 2.83 100%\par FEV1: 1.19 54%\par FEV1/FVC: 42%\par DLCO: 31%\par \par AAT: WNL (117)\par \par Echo: 17: EF 55-60%, mild PHTN with PASP 40mmHg\par \par Spirometry and DLCO 17:\par FVC: 2.71 L (95% pred) --> 2.58 L (90% pred)\par FEV1: 1.20 L (54% pred) --> 1.14 L (51% pred)\par FEV1/FVC: 44% --> 44%\par BRP56-42%: 0.30 L/s (14% pred) --> 0.35 L/s (16% pred)\par DLCO: 8.0 (32% pred)\par PI max 17: [id number] (55% pred)\par 6MWT: 382m, SpO2 start: 95% --> SpO2 end: 88%\par Severe obstruction. Severely reduced diffusion capacity. Vastly improved walk distance (about 300m more than last) and improvement in degree of desaturation (but still with significant desaturation). \par \par PFT 3/28/17:\par FVC: 2.39 L (83% pred) --> 2.22 L (77% pred)\par FEV1: 1.14 L (50% pred) --> 1.09 L (48% pred)\par FEV1/FVC: 48% --> 49%\par GGR26-97%: 0.42 L/s (19% pred) --> 0.40 L/s (18% pred)\par T.84 L (92% pred)\par RV/T%\par DLCO: 8.1 (33% pred)\par 6MWT 3/28/17: Pt declined due to pain from heel spur\par Severe obstructive defect. Normal lung volumes. Very severely reduced DLCO.  Air trapping. \par \par Abhinav with bronchodilator 16:\par FVC: 2.34 L (83% pred) --> 2.67 L (94% pred)\par FEV1: 1.07 L (48% pred) --> 1.24 L (56% pred)\par FEV1/FVC: 46% --> 46%\par HUM96-74%: 0.38 L/s (18% pred) --> 0.41 L/s (19% pred)\par 330cc increase in FVC post-bronchodilator. Moderate obstruction. \par \par CXR 16: pt somewhat rotated, haziness in the LLL which is also seen on the lateral film, cannot rule out pneumonia of the LLL\par \par Spirometry 16:\par FVC: 2.71 L (94% pred)\par FEv1: 1.23 L (55% pred)\par FEV1/FVC: 46%\par NCH50-91%: 0.42 L/s (19% pred)\par DLCO 16: 9.3 (33% pred <-- 29%)\par 6MWT 16: Walk time: 2:00, 86 meters, SpO2 start: 89% --> SpO2 end: 82%\par Improvement in FEV1 and FVC as compared to prior test.  Severe obstructive lung disease. Severely reduced diffusion capacity. Reduced walk distance but test aborted early due to desaturation.  Degree of desaturation improved since previous test. \par \par PFTs 16\par FVC: 1.71 (59%)\par FEV1: 0.98 (44%)\par FEV1/FVC 58%\par FEF25/75: 0.59 (27%)\par T.38 (83%)\par DLCO: 7.3 (29%)\par Severe obstructive lung disease with severe reduced DLCO. No significant bronchodilator response\par \par PFTs 16\par FVC: 2.57 89%\par FEV1: 1.19 52%\par FEV1/FVC: 46%\par DLCO 37%\par 6MWT: 86 meters, Baseline: 88% End: 74%\par \par PFTs 12/17/15\par FVC: 2.73 (94% of pred)\par FEV1: 1.31 l (57% of pred)\par FEV1/FVC: 48%\par WNV06-60%: 0.36 L/s (59% of pred)\par %\par DLCO 43%\par Pt declining 6MWT today \par \par Spirometry:\par FVC: 2.46 L (86% of pred)\par FEV1: 1.26 l (56% of pred)\par FEV1/FVC: 51.2%\par ISR18-40%: 0.64 L/s (29% of pred)\par C/w moderate obstructive pulmonary disease.\par \par RHC 10/13/15:\par -Pulmonary capillary occlusion pressure: 17 mm of Hg\par -Pulmonary artery systolic: 40 mmHg\par -Pulmonary artery diastolic: 20mm Hg\par -Mean pulmonary artery pressure: 29 mm Hg\par -Right atrial pressure: 12mm Hg\par -Cardiac output was 4.43\par Based upon these numbers, the pulmonary artery diastolic and the wedge gradient is normal at 3. The trans-pulmonary pressure gradient is mildly elevated at 12. With a cardiac output of 4.43 L per minute, the pulmonary vascular resistance is normal at slightly less than 3 Woods units.  There is no increase in intrinsic pulmonary vascular resistance. The pulmonary artery pressures are also not elevated out of proportion to the left-sided pressures therefore the patient is not a candidate for advanced vasodilator therapy.\par \par TTE 12/19/15:\par LVH, nomal LVEF PA sys press 34mmHg, mild MR & TR\par \par CT chest CTAPE 08/10/15\par IMPRESSION: No evidence for pulmonary emboli. No significant change in moderate emphysematous changes. No significant change in main pulmonary arterial dilatation, possibly reflecting sequelae of pulmonary arterial hypertension.

## 2022-03-31 NOTE — HISTORY OF PRESENT ILLNESS
[TextBox_4] : \par 65 yo AAF with Asthma/COPD presents for follow-up. Pt with diffuse centrilobular emphysema on triple therapy and daliresp, pulmonary HTN with PAS 43mmHg on RHC on sildenafil, stable pulmonary nodules, and severe exercise restriction\par \par 3-31-22:\par She had COVID in January\par Her sputum from 8/5/21 is growing mycobacterium parascrofulaceum. \par Continues on Breo 200-25, Dailiresp and Incruse. She using Xopenex less often.\par She is needing the Xopenex 3 times a day for chest tightness and shortness of breath. Normally she uses it like once a day. Feels her asthma is acting up lately with weather changes. \par Continues on sildenafil (40, 20, 40 mg). Tolerating medication well. \par Had echo with Dr. Vasquez and cardiac cath on 8/24/20. \par On CPAP for GEO & using throughout the night.\par No fevers or chills.  Denies chest pain or palpitations. \par She has herniated disc in her back\par

## 2022-03-31 NOTE — PHYSICAL EXAM
[No Acute Distress] : no acute distress [Well Nourished] : well nourished [Normal Oropharynx] : normal oropharynx [Normal Appearance] : normal appearance [No Neck Mass] : no neck mass [Normal Rate/Rhythm] : normal rate/rhythm [Normal S1, S2] : normal s1, s2 [No Murmurs] : no murmurs [No Resp Distress] : no resp distress [No Abnormalities] : no abnormalities [Benign] : benign [Normal Gait] : normal gait [No Clubbing] : no clubbing [No Cyanosis] : no cyanosis [No Edema] : no edema [FROM] : FROM [Normal Color/ Pigmentation] : normal color/ pigmentation [No Focal Deficits] : no focal deficits [Oriented x3] : oriented x3 [Normal Affect] : normal affect [TextBox_68] : diminished air entry bilaterally, no wheezing or rhonchi\par diminished air entry bilaterally, no wheezing or rhonchi

## 2022-03-31 NOTE — DISCUSSION/SUMMARY
[FreeTextEntry1] : ATTENDING'S SUMMARY:\par 3-31-22:\par no pallor, no icterus\par no JVD, no hepatojugular reflux, no HSM\par no cervical adenopathy, no supraclavicular adenopathy\par normal s1/s2, no murmurs, rubs or gallops\par diminished air entry bilaterally, no wheezing or rhonchi\par no cyanosis, no clubbing, no articular manifestations, no thickening of the skin\par no pedal edema\par

## 2022-03-31 NOTE — ASSESSMENT
[FreeTextEntry1] : 3-31-22:\par It was a pleasure to see Marce in follow-up today. Her respiratory issues are summarized:\par \par 1. 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 This was done since she has extensive emphysema B/L in both upper and lower lobes. She is using Breo and Incruse due to her reduced inspiratory flows. Additionally, since she was having frequent exacerbations of COPD in the past, she was started on Daliresp, which has reduced the frequency of AECOPD. She has not had a COPD exacerbation or hospitalization since starting Daliresp. Diffuse emphysematous changes were visible on CT chest.\par \par She has been evaluated by Dr. Jose Arzate at the [facility]. \par \par PFT today, 3/31/22 shows stable FVC and FEV1. There is severe obstructive lung defect, FEV1/FVC: 40% and severely reduced diffusion capacity (32%). Lung volumes were normal (89%). Compared to Dec 2020, diffusion capacity is stable. Her 6MWT distance is significantly reduced from 299 meters to 192. Resting O2 saturation was 97% on RA. Patient dropped to 77% on room air at 3 minutes. Patient given 3L 02 via nasal cannula. SpO2 on 3 liters went to 94%.\par \par She continues on Breo 200-25, Daliresp and Incruse. Her exercise tolerance is now stable at 1-2 blocks. Using oxygen 2 L/min intermittently throughout the day for SOB or when she is exerting herself (e.g. house chores). \par \par Sputum culture AFB 8/5 growing Mycobacterium Parascrofulaceum\par Bacterial and fungal cultures are negative\par \par Her CT imaging show both centrilobular and paraseptal emphysema with coalescing areas of destructive emphysema more prominent in the upper lobes bilaterally. The appearance of such changes are with a slightly thicker 'wall' as would be expected in emphysema, and is reminiscent of cyst-like structures, raising concern for conditions such as Pulmonary Langerhans Cell Histiocytosis\par \par Plan:\par - We will prescribe portable oxygen concentrator, 3 liters. She will call me with the name of her DME. We will prescribe humidified oxygen for her home oxygen concentrator.\par - Continue Breo, Incruse, Daliresp and PRN Xopenex\par - No need to add on Azithromycin, as she has remained without exacerbations with the initiation of Daliresp \par - We have reached out to Dr. Yrn Trivedi from  regarding Zephr valve evaluation for lung reduction procedure, however her PFTs and 6-MWT do not meet the criteria for consideration \par - She was evaluated by Dr. Marleny Nettles / Dr. Ernesto Sin (Lung Transplant) to establish care here at [facility]\par \par 2. 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 It is currently unclear the necessity of treating such a NTB, and given the improvement in her respiratory symptoms without such treatment, it becomes even less necessary. Repeat sputum culture on 10/28/21 is growing Mycobacterium Brisbanense.\par \par Plan:\par - We will continue to follow sputum cultures here.\par \par 3. Dyspnea on exertion\par Her symptoms are much improved and she is currently back to her baseline exercise tolerance. \par Ultrasound performed 9/29/20 showed B-lines at bases w/ curtain sign noted. No evidence of pleural effusion on right or left side. Diaphragmatic movement normal. Autoimmune serologies were done on 9/29/20. Double stranded DNA Ab was positive (83), but C4, Butler ab, RNP ab, YOMI, CH50, C3, CCP ab, BNP, RF, ESR, d-dimer were negative. Her Hgb was normal on CBC. \par \par Plan:\par - Plan as above \par \par 4. Pulmonary hypertension and hypoxemic respiratory failure\par Marce likely falls in the 10% of patients with COPD who develop pulmonary hypertension. She tried Adcirca in the past but was unable to tolerate it due to headaches. She is currently on Sildenafil 40 mg in the AM, 20 mg in the afternoon, and 40 mg HS. Tolerating medication well. In the past, she had chest pressure on 40mg TID.\par \par She had right heart catheterization in Dr. Vasquez's office. PA pressures 38/16 with mean 26. Pulmonary capillary occlusion pressure 14.0 Cardiac output 5.8. Diastolic gradient 2.0. Transpulmonary pressure gradient 12.0 (both normal). PVR (12/5.8) 2.0. Based upon the right heart catheterization findings, there is no evidence of precapillary pulmonary hypertension at rest. Although, because this was an office bedside echocardiogram, we likely won't be able to measure right-sided pressures which is what we are most interested in.   The pulmonary artery measures 3.8 cm on CT. She appears to have cysts in the lungs not commonly seen in patients with emphysema. Whether she has a component of Pulmonary Langerhans Cell Histiocytosis, we want to optimize her pulmonary hypertension treatment\par \par Plan:\par - Continue Sildenafil 40 mg in the AM, 20 mg in the afternoon, and 40 mg HS\par - We will refer Marce to our pulmonary hypertension specialist, Dr. aSdie De Paz for repeat right heart catheterization\par - We will continue to monitor her cardiopulmonary symptoms \par \par 5. GEO\par Marce has GEO and is using CPAP. She met with out sleep team today to discuss a new mask as she has been waking up with a very dry mouth.\par \par 6. Pulmonary nodules \par  The micro-nodules appear stable on 8/17/19 CT and do not require further follow-up. \par \par 7. Allergic rhinitis\par No recent exacerbation. She can continue Dymista BID and Budesonide BID nasal rinse.\par \par 8. COVID-19 infection\par She tested positive for COVID in January. She had weakness, headache, chest tightness. She did not require hospitalization.\par \par 9. Health maintenance\par Marce was administered Prevnar (13 valent) in May 2019. Pneumovax was administered on 8/5/21.\par She is traveling to [place] in May. We will schedule high altitude stimulation testing.\par \par Return to clinic: 3 months

## 2022-04-11 PROBLEM — Z11.59 SCREENING FOR VIRAL DISEASE: Status: RESOLVED | Noted: 2020-06-18 | Resolved: 2021-08-31

## 2022-04-17 ENCOUNTER — TRANSCRIPTION ENCOUNTER (OUTPATIENT)
Age: 67
End: 2022-04-17

## 2022-04-20 ENCOUNTER — NON-APPOINTMENT (OUTPATIENT)
Age: 67
End: 2022-04-20

## 2022-04-20 ENCOUNTER — OUTPATIENT (OUTPATIENT)
Dept: OUTPATIENT SERVICES | Facility: HOSPITAL | Age: 67
LOS: 1 days | End: 2022-04-20
Payer: MEDICARE

## 2022-04-20 DIAGNOSIS — J44.9 CHRONIC OBSTRUCTIVE PULMONARY DISEASE, UNSPECIFIED: ICD-10-CM

## 2022-04-20 DIAGNOSIS — I27.20 PULMONARY HYPERTENSION, UNSPECIFIED: ICD-10-CM

## 2022-04-20 PROCEDURE — 94452 HAST W/I&R PHYS/QHP: CPT

## 2022-04-20 PROCEDURE — 94453 HAST W/SUPPL OXYGEN TITRJ: CPT | Mod: 26

## 2022-04-20 RX ORDER — ALBUTEROL 90 UG/1
2 AEROSOL, METERED ORAL ONCE
Refills: 0 | Status: DISCONTINUED | OUTPATIENT
Start: 2022-04-20 | End: 2022-05-04

## 2022-07-07 ENCOUNTER — APPOINTMENT (OUTPATIENT)
Dept: PULMONOLOGY | Facility: CLINIC | Age: 67
End: 2022-07-07

## 2022-07-21 ENCOUNTER — APPOINTMENT (OUTPATIENT)
Dept: PULMONOLOGY | Facility: CLINIC | Age: 67
End: 2022-07-21

## 2022-07-21 VITALS
HEART RATE: 77 BPM | HEIGHT: 66 IN | TEMPERATURE: 97.3 F | BODY MASS INDEX: 29.25 KG/M2 | DIASTOLIC BLOOD PRESSURE: 81 MMHG | OXYGEN SATURATION: 91 % | WEIGHT: 182 LBS | SYSTOLIC BLOOD PRESSURE: 128 MMHG

## 2022-07-21 PROCEDURE — 99215 OFFICE O/P EST HI 40 MIN: CPT

## 2022-07-22 ENCOUNTER — NON-APPOINTMENT (OUTPATIENT)
Age: 67
End: 2022-07-22

## 2022-07-23 ENCOUNTER — NON-APPOINTMENT (OUTPATIENT)
Age: 67
End: 2022-07-23

## 2022-07-24 ENCOUNTER — NON-APPOINTMENT (OUTPATIENT)
Age: 67
End: 2022-07-24

## 2022-07-24 LAB — BACTERIA SPT CULT: NORMAL

## 2022-08-02 NOTE — PROCEDURE
[FreeTextEntry1] : PFT 3/31/22\par FVC: 2.62 L (97%)\par FEV1: 1.05 L (50%)\par FEV1/FVC: 40%\par FEF 25-75%: 0.30 L/s (16%)\par T.69 L (89%)\par RV/T%\par DLCO: 6.95 (30%)\par \par 6MWT <date>\par Patient walked meters during a 6 minute walk test.\par Resting O2 saturation was % on RA.  Heart rate bpm.\par End test O2 saturation was % on RA.  Heart rate bpm.  Ivis scale end of test \par This signifies\par \par \par \par PFT 20\par FVC: 2.58 L (94%)--> 2.49 L (91%)  \par FEV1: 1.06 L (50%)--> 1.06 L (50%)  \par FEV1/FVC: 41%--> 42%\par NVD90-91%: 0.31 L/s (16%)--> 0.32 L/s (16%)\par T.83 L (92%)\par RV/T%\par DLCO: 6.9 (32%)\par \par 6MWT 20\par Patient walked 299 meters during a 6 minute walk test.\par Resting O2 saturation was 95% on RA.  Heart rate 76 bpm. Patient dropped to 85% on room air at 2 minutes. Patient given 2L 02 via nasal cannula.\par End test O2 saturation was 89% on 2 liters O2.  Heart rate 102 bpm.  Ivis scale end of test : 0\par This signifies reduced walk distance and significant desaturation\par \par Ultrasound 20\par - B-lines observed curtain sign noted. No evidence of pleural effusion. Diaphragmatic movement normal. \par \par Cardiac cath 20\par PA pressures 38/16 with mean 26. Pulmonary capillary occlusion pressure 14.0 Cardiac output 5.8. Diastolic gradient 2.0. Transpulmonary pressure gradient 12.0 (both normal). PVR (5.8) 2.0.\par \par Abhinav, DLCO 19:\par FVC: 2.71 L (85%)\par FEV1: 1.26 L (50%)\par FEV1/FVC: 47%\par BYD26-43%: 0.45 L/s (17%)\par DLCO: 8.7 (38%)\par 6MWT: She declined 6MWT today.\par Severe obstructive lung defect. Severely decreased diffusion capacity. \par \par EXAM: CT CHEST  PROCEDURE DATE: 2019 \par COMPARISON: CT chest 2017. \par Lungs and large airways: Again seen is diffuse centrilobular and paraseptal emphysema. \par There has been a decrease in size of previously seen 0.4 mm left upper lobe nodule, now measuring 3 mm (series 4 image 96). \par There is a 4 mm nodule in the superior portion of the left lower lobe (series 4 image 186). \par Pleura: No pleural effusion. \par Mediastinum and hilar regions: No thoracic lymphadenopathy. \par Heart and pericardium: Heart size is normal. No pericardial effusion. Severe coronary artery disease. \par Vessels: Mild calcified plaque aorta. Again seen is enlargement of the main pulmonary artery measuring 3.8 cm. \par Chest wall and lower neck: Normal. \par Upper abdomen: Status post cholecystectomy. Subcentimeter hypodensity in right lobe of liver too small to characterize although likely representing small cyst. \par Bones: Mild degenerative changes. \par IMPRESSION: \par 1. Diffuse emphysematous changes. \par 2. One or two micronodules and nodules (measuring <6mm), stable from 2017. Given risk factors 12 months follow-up low dose CT screening is recommended. \par 3. No change in dilation of the main pulmonary artery which could be a sequela pulmonary hypertension. \par \par [place], DLCO, 6MWT 18:\par FVC: 2.61 L (93%)\par FEV1: 1.22 L (56%)\par FEV1/FVC: 47%\par YKA40-25%: 0.39 L/s (19%)\par DLCO: 7.5 (32%)\par 6MWT: 240 meters, SpO2 start; 95% --> spO2 end: 85%\par Moderately severe obstructive defect. Severely reduced diffusion capacity.  6MWT was aborted at 4 minutes due to desaturation; pt also notes walk distance was limited secondary to knee pain. Reduced walk distance with significant desaturation. \par \par EXAM:  ECHOCARDIOGRAM (CARDIOL) 2018  \par Normal left ventricular size and wall thickness.The left ventricular wall  motion is normal.The left ventricular ejection fraction is normal.The left ventricular ejection fraction is 60%.The right ventricle is normal in size and function.The left atrial size is normal.Right atrial size is normal.Structurally normal aortic valve.No aortic regurgitation noted.Mildly calcified anterior leaflet of the mitral valve.There is mild to moderate mitral regurgitation.Structurally normal tricuspid valve.There is trace to mild tricuspid regurgitation.There is mild pulmonary hypertension.The pulmonary artery systolic pressure is estimated to be 40 mmHg.Structurally normal pulmonic valve.There is trace pulmonic regurgitation.There is no\par  pericardial effusion.There is no significant change when compared to the echo from 2017.\par \par Spirometry\par FVC: 2.83 100%\par FEV1: 1.19 54%\par FEV1/FVC: 42%\par DLCO: 31%\par \par AAT: WNL (117)\par \par Echo: 17: EF 55-60%, mild PHTN with PASP 40mmHg\par \par Spirometry and DLCO 17:\par FVC: 2.71 L (95% pred) --> 2.58 L (90% pred)\par FEV1: 1.20 L (54% pred) --> 1.14 L (51% pred)\par FEV1/FVC: 44% --> 44%\par AES70-17%: 0.30 L/s (14% pred) --> 0.35 L/s (16% pred)\par DLCO: 8.0 (32% pred)\par PI max 17: 48wyV2P (55% pred)\par 6MWT: 382m, SpO2 start: 95% --> SpO2 end: 88%\par Severe obstruction. Severely reduced diffusion capacity. Vastly improved walk distance (about 300m more than last) and improvement in degree of desaturation (but still with significant desaturation). \par \par PFT 3/28/17:\par FVC: 2.39 L (83% pred) --> 2.22 L (77% pred)\par FEV1: 1.14 L (50% pred) --> 1.09 L (48% pred)\par FEV1/FVC: 48% --> 49%\par LJT71-78%: 0.42 L/s (19% pred) --> 0.40 L/s (18% pred)\par T.84 L (92% pred)\par RV/T%\par DLCO: 8.1 (33% pred)\par 6MWT 3/28/17: Pt declined due to pain from heel spur\par Severe obstructive defect. Normal lung volumes. Very severely reduced DLCO.  Air trapping. \par \par Abhinav with bronchodilator 16:\par FVC: 2.34 L (83% pred) --> 2.67 L (94% pred)\par FEV1: 1.07 L (48% pred) --> 1.24 L (56% pred)\par FEV1/FVC: 46% --> 46%\par OMU05-44%: 0.38 L/s (18% pred) --> 0.41 L/s (19% pred)\par 330cc increase in FVC post-bronchodilator. Moderate obstruction. \par \par CXR 16: pt somewhat rotated, haziness in the LLL which is also seen on the lateral film, cannot rule out pneumonia of the LLL\par \par Spirometry 16:\par FVC: 2.71 L (94% pred)\par FEv1: 1.23 L (55% pred)\par FEV1/FVC: 46%\par DKL63-83%: 0.42 L/s (19% pred)\par DLCO 16: 9.3 (33% pred <-- 29%)\par 6MWT 16: Walk time: 2:00, 86 meters, SpO2 start: 89% --> SpO2 end: 82%\par Improvement in FEV1 and FVC as compared to prior test.  Severe obstructive lung disease. Severely reduced diffusion capacity. Reduced walk distance but test aborted early due to desaturation.  Degree of desaturation improved since previous test. \par \par PFTs 16\par FVC: 1.71 (59%)\par FEV1: 0.98 (44%)\par FEV1/FVC 58%\par FEF25/75: 0.59 (27%)\par T.38 (83%)\par DLCO: 7.3 (29%)\par Severe obstructive lung disease with severe reduced DLCO. No significant bronchodilator response\par \par PFTs 16\par FVC: 2.57 89%\par FEV1: 1.19 52%\par FEV1/FVC: 46%\par DLCO 37%\par 6MWT: 86 meters, Baseline: 88% End: 74%\par \par PFTs 12/17/15\par FVC: 2.73 (94% of pred)\par FEV1: 1.31 l (57% of pred)\par FEV1/FVC: 48%\par FAV03-97%: 0.36 L/s (59% of pred)\par %\par DLCO 43%\par Pt declining 6MWT today \par \par Spirometry:\par FVC: 2.46 L (86% of pred)\par FEV1: 1.26 l (56% of pred)\par FEV1/FVC: 51.2%\par CZR61-43%: 0.64 L/s (29% of pred)\par C/w moderate obstructive pulmonary disease.\par \par RHC 10/13/15:\par -Pulmonary capillary occlusion pressure: 17 mm of Hg\par -Pulmonary artery systolic: 40 mmHg\par -Pulmonary artery diastolic: 20mm Hg\par -Mean pulmonary artery pressure: 29 mm Hg\par -Right atrial pressure: 12mm Hg\par -Cardiac output was 4.43\par Based upon these numbers, the pulmonary artery diastolic and the wedge gradient is normal at 3. The trans-pulmonary pressure gradient is mildly elevated at 12. With a cardiac output of 4.43 L per minute, the pulmonary vascular resistance is normal at slightly less than 3 Woods units.  There is no increase in intrinsic pulmonary vascular resistance. The pulmonary artery pressures are also not elevated out of proportion to the left-sided pressures therefore the patient is not a candidate for advanced vasodilator therapy.\par \par TTE 12/19/15:\par LVH, nomal LVEF PA sys press 34mmHg, mild MR & TR\par \par CT chest CTAPE 08/10/15\par IMPRESSION: No evidence for pulmonary emboli. No significant change in moderate emphysematous changes. No significant change in main pulmonary arterial dilatation, possibly reflecting sequelae of pulmonary arterial hypertension.

## 2022-08-02 NOTE — PHYSICAL EXAM
[TextBox_68] : diminished air entry bilaterally, no wheezing or rhonchi\par diminished air entry bilaterally, no wheezing or rhonchi

## 2022-08-02 NOTE — REVIEW OF SYSTEMS
[Fever] : no fever [Cough] : no cough [Chest Tightness] : no chest tightness [Wheezing] : no wheezing [Chest Discomfort] : no chest discomfort [Edema] : no edema [Palpitations] : no palpitations [TextBox_94] : herniated disc

## 2022-08-02 NOTE — HISTORY OF PRESENT ILLNESS
[TextBox_4] : \par 65 yo AAF with Asthma/COPD presents for follow-up. Pt with diffuse centrilobular emphysema on triple therapy and daliresp, pulmonary HTN with PAS 43mmHg on RHC on sildenafil, stable pulmonary nodules, and severe exercise restriction on home o2. \par \par 7-21-22:\par Has been doing well overall and breathing is stable. Using all her medications and has only needed to use the xopenex rarely (last use 2 days ago but proir to that had not used in a while.) her exercise capacity is stable--continues to have difficulty with stairs. Using home O2 on exertion prn. Using her CPAP nightly. Only recently allergies have been starting to bother her--sx of cough, postnasal drip, congestion with the change of season and heat. Went to urgent care a few days ago and was prescribed "allergy pill" and 5 day course of steroids that is about to complete.  Using budesonide BID. No recent hospitalizations. 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 \par Continues on Breo 200-25, Dailiresp and Incruse. She using Xopenex less often.\par She is needing the Xopenex 3 times a day for chest tightness and shortness of breath. Normally she uses it like once a day. Feels her asthma is acting up lately with weather changes. \par Continues on sildenafil (40, 20, 40 mg). Tolerating medication well. \par Had echo with Dr. Vasquez and cardiac cath on 8/24/20. \par On CPAP for GEO & using throughout the night.\par No fevers or chills.  Denies chest pain or palpitations. \par She has herniated disc in her back\par

## 2022-08-02 NOTE — ADDENDUM
[FreeTextEntry1] : I, Dr. Larry Epps, personally performed the evaluation and management services for this established patient who presents today with a new problem/exacerbation of an existing condition.  The E/M includes conducting patient interview, detailed physical examination, assessing all new and exacerbated conditions, reviewing all investigations and establishing a new plan of care.  Today, my pulmonary fellow, was part of the evaluation and management.  He understands changes in the patient management.

## 2022-08-02 NOTE — ASSESSMENT
[FreeTextEntry1] : 7-21-22:\par It was a pleasure to see Marce in follow-up today. Her respiratory issues are summarized:\par \par 1. 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 This was done since she has extensive emphysema B/L in both upper and lower lobes. She is using Breo and Incruse due to her reduced inspiratory flows. Additionally, since she was having frequent exacerbations of COPD in the past, she was started on Daliresp, which has reduced the frequency of AECOPD. She has not had a COPD exacerbation or hospitalization since starting Daliresp. Diffuse emphysematous changes were visible on CT chest.\par \par She has been evaluated by Dr. Jose Arzate at the [facility] as well as \par \par PFT 3/31/22 shows stable FVC and FEV1. There is severe obstructive lung defect, FEV1/FVC: 40% and severely reduced diffusion capacity (32%). Lung volumes were normal (89%). Compared to Dec 2020, diffusion capacity is stable. Her 6MWT distance is significantly reduced from 299 meters to 192. Resting O2 saturation was 97% on RA. Patient dropped to 77% on room air at 3 minutes. Patient given 3L 02 via nasal cannula. SpO2 on 3 liters went to 94%.\par She continues on Breo 200-25, Daliresp and Incruse. Her exercise tolerance is now stable at 1-2 blocks. Using oxygen 2 L/min intermittently throughout the day for SOB or when she is exerting herself (e.g. house chores). \par Sputum culture AFB 8/5 growing Mycobacterium Parascrofulaceum\par Bacterial and fungal cultures are negative\par \par Her CT imaging show both centrilobular and paraseptal emphysema with coalescing areas of destructive emphysema more prominent in the upper lobes bilaterally. The appearance of such changes are with a slightly thicker 'wall' as would be expected in emphysema, and is reminiscent of cyst-like structures, raising concern for conditions such as Pulmonary Langerhans Cell Histiocytosis. Alpha 1 antitrypsin was checked and negative in 2017.\par \par High altitude stimulation testing High was performed. Resting O2 sat was 94%\par After 18 minutes of breathing 15% oxygen, end O2 sat decreased to 86%. Patient was given 2L o2 via nasal cannula. End O2 sat was 92% on 2 liters O2. I advised Marce she must fly with oxygen\par \par Plan:\par - Continue Breo, Incruse, Daliresp and PRN Xopenex\par - No need to add on Azithromycin, as she has remained without exacerbations with the initiation of Daliresp \par - she is not a candidate for endobronchial valve given diffuse disease \par - She was evaluated by Dr. Marleny Nettles / Dr. Ernesto Sin (Lung Transplant) to establish care here at [facility]\par -will refer to pulmonary rehab with Adonay Searsmatthew\par \par 2. 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 It is currently unclear the necessity of treating such a NTB, and given the improvement in her respiratory symptoms without such treatment, it becomes even less necessary. Repeat sputum culture on 10/28/21 is growing Mycobacterium Brisbanense.\par \par Plan:\par -collected sputum this visit \par \par 3. Dyspnea on exertion\par Her symptoms are much improved and she is currently back to her baseline exercise tolerance. \par Ultrasound performed 9/29/20 showed B-lines at bases w/ curtain sign noted. No evidence of pleural effusion on right or left side. Diaphragmatic movement normal. Autoimmune serologies were done on 9/29/20. Double stranded DNA Ab was positive (83), but C4, Butler ab, RNP ab, YOMI, CH50, C3, CCP ab, BNP, RF, ESR, d-dimer were negative. Her Hgb was normal on CBC. \par \par Plan:\par - Plan as above \par \par 4. Pulmonary hypertension and hypoxemic respiratory failure\par Marce likely falls in the 10% of patients with COPD who develop pulmonary hypertension. She tried Adcirca in the past but was unable to tolerate it due to headaches. She is currently on Sildenafil 40 mg in the AM, 20 mg in the afternoon, and 40 mg HS. Tolerating medication well without sx. \par \par She had right heart catheterization in Dr. Vasquez's office. PA pressures 38/16 with mean 26. Pulmonary capillary occlusion pressure 14.0 Cardiac output 5.8. Diastolic gradient 2.0. Transpulmonary pressure gradient 12.0 (both normal). PVR (12/5.8) 2.0. Based upon the right heart catheterization findings, there is no evidence of precapillary pulmonary hypertension at rest. Although, because this was an office bedside echocardiogram, we likely won't be able to measure right-sided pressures which is what we are most interested in.   The pulmonary artery measures 3.8 cm on CT. She appears to have cysts in the lungs not commonly seen in patients with emphysema. Whether she has a component of Pulmonary Langerhans Cell Histiocytosis, we want to optimize her pulmonary hypertension treatment\par \par Plan:\par - Continue Sildenafil, however, will increase to 40mg TID (prev on 40mg, 20mg, 40mg) \par - We will refer Marce to our pulmonary hypertension specialist, Dr. Sadie De Paz for consideration of repeat right heart catheterization\par - We will continue to monitor her cardiopulmonary symptoms\par \par 5. GEO\par Marce has GEO and is using CPAP nightly. \par \par 6. Pulmonary nodules \par  Previously monitored nodules stable however new RUL approx 4-5mm seen not seen on 2015 CT chest however since 2017, 2019, and most recent 3/22 has increased in size. Slow growth, however, we discussed that this could possibly be a malignancy but would not biopsy currently given its size could not rule out if false negative  and also high risk for PTX. \par -will repeat CT chest now 7/22 or 8/22 --if stable will then repeat in 6mths \par \par 7. Allergic rhinitis\par Continued sx--currently finishing steroid dose She can continue Dymista BID and Budesonide BID nasal rinse.\par \par 8. COVID-19 infection, resolved \par She tested positive for COVID in January. She had weakness, headache, chest tightness. She did not require hospitalization. \par \par 9. Health maintenance\par Marce was administered Prevnar (13 valent) in May 2019. Pneumovax was administered on 8/5/21.\par \par Return to clinic: 3 months

## 2022-08-02 NOTE — DISCUSSION/SUMMARY
[FreeTextEntry1] : ATTENDING'S SUMMARY:\par 7-21-22:\par no pallor, no icterus\par no JVD, no hepatojugular reflux, no clinically significant HSM\par no cervical adenopathy, no supraclavicular adenopathy\par normal s1/s2, no murmurs, rubs or gallops; P2 is not loud or split \par diminished air entry bilaterally, no wheezing, rales, or rhonchi\par no cyanosis, no clubbing, no articular manifestations, no thickening of the skin\par no pedal edema\par

## 2022-08-21 NOTE — ED PROVIDER NOTE - EYES NEGATIVE STATEMENT, MLM
self-care/home management/community/leisure
no discharge, no irritation, no pain, no redness, and no visual changes.

## 2022-08-23 ENCOUNTER — APPOINTMENT (OUTPATIENT)
Dept: PULMONOLOGY | Facility: CLINIC | Age: 67
End: 2022-08-23

## 2022-09-01 ENCOUNTER — APPOINTMENT (OUTPATIENT)
Dept: ORTHOPEDIC SURGERY | Facility: CLINIC | Age: 67
End: 2022-09-01

## 2022-09-01 VITALS — WEIGHT: 182 LBS | HEIGHT: 66 IN | BODY MASS INDEX: 29.25 KG/M2

## 2022-09-01 PROCEDURE — 73564 X-RAY EXAM KNEE 4 OR MORE: CPT | Mod: RT

## 2022-09-01 PROCEDURE — 20611 DRAIN/INJ JOINT/BURSA W/US: CPT

## 2022-09-01 PROCEDURE — 99214 OFFICE O/P EST MOD 30 MIN: CPT | Mod: 25

## 2022-09-01 PROCEDURE — 99204 OFFICE O/P NEW MOD 45 MIN: CPT | Mod: 25

## 2022-09-01 NOTE — DISCUSSION/SUMMARY
[de-identified] : Patient allowed to gently start resuming activities.\par Discussed change to medication prescription and usage. \par Offered cortisone steroid injection. \par Bracing options discussed with patient. \par Hyaluronic Acid inj pamphlet given to pt.\par Name of Insurance\par Insurance Address:\par \par 09/01/2022 \par \par  \par \par RE:  STEPH GARCIA \par \par Acct #- [id number] \par \par \par Attention:  Nurse Reviewer /Medical Director\par \par I am writing this letter as a medical necessity for PT program.\par Patient has tried analgesics, non-steroid anti-inflammatory agents, \par hot or cold compresses,injections of corticosteroids, etc)  which in combination or by themselves has not worked.\par Based on my patient's condition, I strongly believe that the PT is medically needed.\par  \par Thank you for your time and consideration.   \par  \par \par \par \par \par

## 2022-09-01 NOTE — HISTORY OF PRESENT ILLNESS
[Gradual] : gradual [7] : 7 [1] : 2 [Burning] : burning [Dull/Aching] : dull/aching [Sharp] : sharp [Intermittent] : intermittent [Household chores] : household chores [Leisure] : leisure [Nothing helps with pain getting better] : Nothing helps with pain getting better [Standing] : standing [Walking] : walking [de-identified] : has had for several years, in 2018 saw Dr Guzman and had CSI with some help and tried Biofreeze, worse with stairs [] : Post Surgical Visit: no [FreeTextEntry1] : jacy knees [FreeTextEntry5] : pt has arthritis in both knees [FreeTextEntry7] : legs [de-identified] : m

## 2022-09-01 NOTE — PHYSICAL EXAM
[5___] : quadriceps 5[unfilled]/5 [] : mildly antalgic [Right] : right knee [Left] : left knee [All Views] : anteroposterior, lateral, skyline, and anteroposterior standing [Degenerative change] : Degenerative change [FreeTextEntry9] : sessile osteochondroma unchanged in 2018 [TWNoteComboBox7] : flexion 135 degrees

## 2022-09-23 ENCOUNTER — NON-APPOINTMENT (OUTPATIENT)
Age: 67
End: 2022-09-23

## 2022-10-25 ENCOUNTER — APPOINTMENT (OUTPATIENT)
Dept: PULMONOLOGY | Facility: CLINIC | Age: 67
End: 2022-10-25
Payer: MEDICARE

## 2022-11-21 ENCOUNTER — NON-APPOINTMENT (OUTPATIENT)
Age: 67
End: 2022-11-21

## 2022-11-22 ENCOUNTER — APPOINTMENT (OUTPATIENT)
Dept: PULMONOLOGY | Facility: CLINIC | Age: 67
End: 2022-11-22
Payer: MEDICARE

## 2023-01-03 ENCOUNTER — APPOINTMENT (OUTPATIENT)
Dept: PULMONOLOGY | Facility: CLINIC | Age: 68
End: 2023-01-03

## 2023-01-20 ENCOUNTER — NON-APPOINTMENT (OUTPATIENT)
Age: 68
End: 2023-01-20

## 2023-01-24 ENCOUNTER — APPOINTMENT (OUTPATIENT)
Dept: PULMONOLOGY | Facility: CLINIC | Age: 68
End: 2023-01-24
Payer: MEDICARE

## 2023-01-24 PROCEDURE — 99443: CPT | Mod: 95

## 2023-01-24 NOTE — PROCEDURE
[FreeTextEntry1] : PFT 3/31/22\par FVC: 2.62 L (97%)\par FEV1: 1.05 L (50%)\par FEV1/FVC: 40%\par FEF 25-75%: 0.30 L/s (16%)\par T.69 L (89%)\par RV/T%\par DLCO: 6.95 (30%)\par \par 6MWT <date>\par Patient walked meters during a 6 minute walk test.\par Resting O2 saturation was % on RA.  Heart rate bpm.\par End test O2 saturation was % on RA.  Heart rate bpm.  Ivis scale end of test \par This signifies\par \par \par \par PFT 20\par FVC: 2.58 L (94%)--> 2.49 L (91%)  \par FEV1: 1.06 L (50%)--> 1.06 L (50%)  \par FEV1/FVC: 41%--> 42%\par EDN71-66%: 0.31 L/s (16%)--> 0.32 L/s (16%)\par T.83 L (92%)\par RV/T%\par DLCO: 6.9 (32%)\par \par 6MWT 20\par Patient walked 299 meters during a 6 minute walk test.\par Resting O2 saturation was 95% on RA.  Heart rate 76 bpm. Patient dropped to 85% on room air at 2 minutes. Patient given 2L 02 via nasal cannula.\par End test O2 saturation was 89% on 2 liters O2.  Heart rate 102 bpm.  Ivis scale end of test : 0\par This signifies reduced walk distance and significant desaturation\par \par Ultrasound 20\par - B-lines observed curtain sign noted. No evidence of pleural effusion. Diaphragmatic movement normal. \par \par Cardiac cath 20\par PA pressures 38/16 with mean 26. Pulmonary capillary occlusion pressure 14.0 Cardiac output 5.8. Diastolic gradient 2.0. Transpulmonary pressure gradient 12.0 (both normal). PVR (5.8) 2.0.\par \par Abhinav, DLCO 19:\par FVC: 2.71 L (85%)\par FEV1: 1.26 L (50%)\par FEV1/FVC: 47%\par IEI15-10%: 0.45 L/s (17%)\par DLCO: 8.7 (38%)\par 6MWT: She declined 6MWT today.\par Severe obstructive lung defect. Severely decreased diffusion capacity. \par \par EXAM: CT CHEST  PROCEDURE DATE: 2019 \par COMPARISON: CT chest 2017. \par Lungs and large airways: Again seen is diffuse centrilobular and paraseptal emphysema. \par There has been a decrease in size of previously seen 0.4 mm left upper lobe nodule, now measuring 3 mm (series 4 image 96). \par There is a 4 mm nodule in the superior portion of the left lower lobe (series 4 image 186). \par Pleura: No pleural effusion. \par Mediastinum and hilar regions: No thoracic lymphadenopathy. \par Heart and pericardium: Heart size is normal. No pericardial effusion. Severe coronary artery disease. \par Vessels: Mild calcified plaque aorta. Again seen is enlargement of the main pulmonary artery measuring 3.8 cm. \par Chest wall and lower neck: Normal. \par Upper abdomen: Status post cholecystectomy. Subcentimeter hypodensity in right lobe of liver too small to characterize although likely representing small cyst. \par Bones: Mild degenerative changes. \par IMPRESSION: \par 1. Diffuse emphysematous changes. \par 2. One or two micronodules and nodules (measuring <6mm), stable from 2017. Given risk factors 12 months follow-up low dose CT screening is recommended. \par 3. No change in dilation of the main pulmonary artery which could be a sequela pulmonary hypertension. \par \par [place], DLCO, 6MWT 18:\par FVC: 2.61 L (93%)\par FEV1: 1.22 L (56%)\par FEV1/FVC: 47%\par TKI18-57%: 0.39 L/s (19%)\par DLCO: 7.5 (32%)\par 6MWT: 240 meters, SpO2 start; 95% --> spO2 end: 85%\par Moderately severe obstructive defect. Severely reduced diffusion capacity.  6MWT was aborted at 4 minutes due to desaturation; pt also notes walk distance was limited secondary to knee pain. Reduced walk distance with significant desaturation. \par \par EXAM:  ECHOCARDIOGRAM (CARDIOL) 2018  \par Normal left ventricular size and wall thickness.The left ventricular wall  motion is normal.The left ventricular ejection fraction is normal.The left ventricular ejection fraction is 60%.The right ventricle is normal in size and function.The left atrial size is normal.Right atrial size is normal.Structurally normal aortic valve.No aortic regurgitation noted.Mildly calcified anterior leaflet of the mitral valve.There is mild to moderate mitral regurgitation.Structurally normal tricuspid valve.There is trace to mild tricuspid regurgitation.There is mild pulmonary hypertension.The pulmonary artery systolic pressure is estimated to be 40 mmHg.Structurally normal pulmonic valve.There is trace pulmonic regurgitation.There is no\par  pericardial effusion.There is no significant change when compared to the echo from 2017.\par \par Spirometry\par FVC: 2.83 100%\par FEV1: 1.19 54%\par FEV1/FVC: 42%\par DLCO: 31%\par \par AAT: WNL (117)\par \par Echo: 17: EF 55-60%, mild PHTN with PASP 40mmHg\par \par Spirometry and DLCO 17:\par FVC: 2.71 L (95% pred) --> 2.58 L (90% pred)\par FEV1: 1.20 L (54% pred) --> 1.14 L (51% pred)\par FEV1/FVC: 44% --> 44%\par FNK25-35%: 0.30 L/s (14% pred) --> 0.35 L/s (16% pred)\par DLCO: 8.0 (32% pred)\par PI max 17: [id number] (55% pred)\par 6MWT: 382m, SpO2 start: 95% --> SpO2 end: 88%\par Severe obstruction. Severely reduced diffusion capacity. Vastly improved walk distance (about 300m more than last) and improvement in degree of desaturation (but still with significant desaturation). \par \par PFT 3/28/17:\par FVC: 2.39 L (83% pred) --> 2.22 L (77% pred)\par FEV1: 1.14 L (50% pred) --> 1.09 L (48% pred)\par FEV1/FVC: 48% --> 49%\par IWL62-04%: 0.42 L/s (19% pred) --> 0.40 L/s (18% pred)\par T.84 L (92% pred)\par RV/T%\par DLCO: 8.1 (33% pred)\par 6MWT 3/28/17: Pt declined due to pain from heel spur\par Severe obstructive defect. Normal lung volumes. Very severely reduced DLCO.  Air trapping. \par \par Abhinav with bronchodilator 16:\par FVC: 2.34 L (83% pred) --> 2.67 L (94% pred)\par FEV1: 1.07 L (48% pred) --> 1.24 L (56% pred)\par FEV1/FVC: 46% --> 46%\par PXI14-69%: 0.38 L/s (18% pred) --> 0.41 L/s (19% pred)\par 330cc increase in FVC post-bronchodilator. Moderate obstruction. \par \par CXR 16: pt somewhat rotated, haziness in the LLL which is also seen on the lateral film, cannot rule out pneumonia of the LLL\par \par Spirometry 16:\par FVC: 2.71 L (94% pred)\par FEv1: 1.23 L (55% pred)\par FEV1/FVC: 46%\par SUT32-80%: 0.42 L/s (19% pred)\par DLCO 16: 9.3 (33% pred <-- 29%)\par 6MWT 16: Walk time: 2:00, 86 meters, SpO2 start: 89% --> SpO2 end: 82%\par Improvement in FEV1 and FVC as compared to prior test.  Severe obstructive lung disease. Severely reduced diffusion capacity. Reduced walk distance but test aborted early due to desaturation.  Degree of desaturation improved since previous test. \par \par PFTs 16\par FVC: 1.71 (59%)\par FEV1: 0.98 (44%)\par FEV1/FVC 58%\par FEF25/75: 0.59 (27%)\par T.38 (83%)\par DLCO: 7.3 (29%)\par Severe obstructive lung disease with severe reduced DLCO. No significant bronchodilator response\par \par PFTs 16\par FVC: 2.57 89%\par FEV1: 1.19 52%\par FEV1/FVC: 46%\par DLCO 37%\par 6MWT: 86 meters, Baseline: 88% End: 74%\par \par PFTs 12/17/15\par FVC: 2.73 (94% of pred)\par FEV1: 1.31 l (57% of pred)\par FEV1/FVC: 48%\par GTW04-62%: 0.36 L/s (59% of pred)\par %\par DLCO 43%\par Pt declining 6MWT today \par \par Spirometry:\par FVC: 2.46 L (86% of pred)\par FEV1: 1.26 l (56% of pred)\par FEV1/FVC: 51.2%\par NLI01-10%: 0.64 L/s (29% of pred)\par C/w moderate obstructive pulmonary disease.\par \par RHC 10/13/15:\par -Pulmonary capillary occlusion pressure: 17 mm of Hg\par -Pulmonary artery systolic: 40 mmHg\par -Pulmonary artery diastolic: 20mm Hg\par -Mean pulmonary artery pressure: 29 mm Hg\par -Right atrial pressure: 12mm Hg\par -Cardiac output was 4.43\par Based upon these numbers, the pulmonary artery diastolic and the wedge gradient is normal at 3. The trans-pulmonary pressure gradient is mildly elevated at 12. With a cardiac output of 4.43 L per minute, the pulmonary vascular resistance is normal at slightly less than 3 Woods units.  There is no increase in intrinsic pulmonary vascular resistance. The pulmonary artery pressures are also not elevated out of proportion to the left-sided pressures therefore the patient is not a candidate for advanced vasodilator therapy.\par \par TTE 12/19/15:\par LVH, nomal LVEF PA sys press 34mmHg, mild MR & TR\par \par CT chest CTAPE 08/10/15\par IMPRESSION: No evidence for pulmonary emboli. No significant change in moderate emphysematous changes. No significant change in main pulmonary arterial dilatation, possibly reflecting sequelae of pulmonary arterial hypertension.

## 2023-01-24 NOTE — REVIEW OF SYSTEMS
[Cough] : cough [Dyspnea] : dyspnea [Fever] : no fever [Chest Tightness] : no chest tightness [Wheezing] : no wheezing [Chest Discomfort] : no chest discomfort [Edema] : no edema [Palpitations] : no palpitations [TextBox_94] : herniated disc

## 2023-01-24 NOTE — REASON FOR VISIT
[TextBox_44] : This telephonic visit was provided via audio only technology. The patient, STEPH GARCIA, was located at home, [address] [place] [address] , at the time of the visit. \par The provider, RICHARD GALLEGOS, was located at the medical office located in  at the time of the visit. The patient, STEPH GARCIA and Provider participated in the telephonic visit. \par Verbal consent for telephonic services was given on January 24, 2023 by the patient, STEPH GARCIA.

## 2023-01-24 NOTE — HISTORY OF PRESENT ILLNESS
[TextBox_4] : \par 63 yo AAF with Asthma/COPD presents for follow-up. Pt with diffuse centrilobular emphysema on triple therapy and daliresp, pulmonary HTN with PAS 43mmHg on RHC on sildenafil, stable pulmonary nodules, and severe exercise restriction on home o2. \par \par 1-24-23:\amando Has a cough and congestion that started 4 days ago. She went to [facility] urgent care and tested negative for COVID-19. She was prescribed a course of prednisone and says that her symptoms are improving overall but not fast enough. She sometimes finds it hard to breath. Her O2 sat this morning on her pulse oximeter was 95%. It usually ranges from 94 – 95%. She is scheduled for CT appointment. \par \par 7-21-22:\amando Has been doing well overall and breathing is stable. Using all her medications and has only needed to use the xopenex rarely (last use 2 days ago but proir to that had not used in a while.) her exercise capacity is stable--continues to have difficulty with stairs. Using home O2 on exertion prn. Using her CPAP nightly. Only recently allergies have been starting to bother her--sx of cough, postnasal drip, congestion with the change of season and heat. Went to urgent care a few days ago and was prescribed "allergy pill" and 5 day course of steroids that is about to complete.  Using budesonide BID. No recent hospitalizations. 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 \par Continues on Breo 200-25, Dailiresp and Incruse. She using Xopenex less often.\par She is needing the Xopenex 3 times a day for chest tightness and shortness of breath. Normally she uses it like once a day. Feels her asthma is acting up lately with weather changes. \par Continues on sildenafil (40, 20, 40 mg). Tolerating medication well. \par Had echo with Dr. Vasquez and cardiac cath on 8/24/20. \par On CPAP for GEO & using throughout the night.\par No fevers or chills.  Denies chest pain or palpitations. \par She has herniated disc in her back\par

## 2023-01-24 NOTE — ASSESSMENT
[FreeTextEntry1] : 1-24-23:\par It was a pleasure to see Marce in follow-up today. Her respiratory issues are summarized:\par \par 1. 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 This was done since she has extensive emphysema B/L in both upper and lower lobes. She is using Breo and Incruse due to her reduced inspiratory flows. Additionally, since she was having frequent exacerbations of COPD in the past, she was started on Daliresp, which has reduced the frequency of AECOPD. She has not had a COPD exacerbation or hospitalization since starting Daliresp. Diffuse emphysematous changes were visible on CT chest.\par \par She has been evaluated by Dr. Jose Arzate at the [facility] as well as at [facility]\par \par PFT 3/31/22 shows stable FVC and FEV1. There is severe obstructive lung defect, FEV1/FVC: 40% and severely reduced diffusion capacity (32%). Lung volumes were normal (89%). Compared to Dec 2020, diffusion capacity is stable. Her 6MWT distance is significantly reduced from 299 meters to 192. Resting O2 saturation was 97% on RA. Patient dropped to 77% on room air at 3 minutes. Patient given 3L 02 via nasal cannula. SpO2 on 3 liters went to 94%.\par She continues on Breo 200-25, Daliresp and Incruse. Her exercise tolerance is now stable at 1-2 blocks. Using oxygen 2 L/min intermittently throughout the day for SOB or when she is exerting herself (e.g. house chores). \par Sputum culture AFB 8/5 growing Mycobacterium Parascrofulaceum\par Bacterial and fungal cultures are negative\par \par Her CT imaging show both centrilobular and paraseptal emphysema with coalescing areas of destructive emphysema more prominent in the upper lobes bilaterally. The appearance of such changes are with a slightly thicker 'wall' as would be expected in emphysema, and is reminiscent of cyst-like structures, raising concern for conditions such as Pulmonary Langerhans Cell Histiocytosis. Alpha 1 antitrypsin was checked and negative in 2017.\par \par High altitude stimulation testing High was performed. Resting O2 sat was 94%\par After 18 minutes of breathing 15% oxygen, end O2 sat decreased to 86%. Patient was given 2L o2 via nasal cannula. End O2 sat was 92% on 2 liters O2. I advised Marce she must fly with oxygen\par \par Plan:\par - Continue Breo, Incruse, Daliresp and PRN Xopenex\par - No need to add on Azithromycin, as she has remained without exacerbations with the initiation of Daliresp \par - she is not a candidate for endobronchial valve given diffuse disease \par - She was evaluated by Dr. Marleny Nettles / Dr. Ernesto Sin (Lung Transplant) to establish care here at [facility]\par -will refer to pulmonary rehab with Adonay Najera\par \par 2. 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 It is currently unclear the necessity of treating such a NTB, and given the improvement in her respiratory symptoms without such treatment, it becomes even less necessary. Repeat sputum culture on 10/28/21 is growing Mycobacterium Brisbanense.\par \par Plan:\par -collected sputum at next visit\par \par 3. Dyspnea on exertion\par Her symptoms are much improved and she is currently back to her baseline exercise tolerance. \par Ultrasound performed 9/29/20 showed B-lines at bases w/ curtain sign noted. No evidence of pleural effusion on right or left side. Diaphragmatic movement normal. Autoimmune serologies were done on 9/29/20. Double stranded DNA Ab was positive (83), but C4, Butler ab, RNP ab, YOMI, CH50, C3, CCP ab, BNP, RF, ESR, d-dimer were negative. Her Hgb was normal on CBC. \par \par Plan:\par - Plan as above \par \par 4. Pulmonary hypertension and hypoxemic respiratory failure\par Marce likely falls in the 10% of patients with COPD who develop pulmonary hypertension. She tried Adcirca in the past but was unable to tolerate it due to headaches. She is currently on Sildenafil 40 mg in the AM, 20 mg in the afternoon, and 40 mg HS. Tolerating medication well without sx. \par \par She had right heart catheterization in Dr. Vasquez's office. PA pressures 38/16 with mean 26. Pulmonary capillary occlusion pressure 14.0 Cardiac output 5.8. Diastolic gradient 2.0. Transpulmonary pressure gradient 12.0 (both normal). PVR (12/5.8) 2.0. Based upon the right heart catheterization findings, there is no evidence of precapillary pulmonary hypertension at rest. Although, because this was an office bedside echocardiogram, we likely won't be able to measure right-sided pressures which is what we are most interested in.   The pulmonary artery measures 3.8 cm on CT. She appears to have cysts in the lungs not commonly seen in patients with emphysema. Whether she has a component of Pulmonary Langerhans Cell Histiocytosis, we want to optimize her pulmonary hypertension treatment\par \par Plan:\par - Continue Sildenafil, however, will increase to 40mg TID (prev on 40mg, 20mg, 40mg) \par - We will refer Marce to our pulmonary hypertension specialist, Dr. Sadie De Paz for consideration of repeat right heart catheterization\par - We will continue to monitor her cardiopulmonary symptoms\par \par 5. GEO\par Marce has GEO and is using CPAP nightly. \par \par 6. Pulmonary nodules \par  Previously monitored nodules stable however new RUL approx 4-5mm seen not seen on 2015 CT chest however since 2017, 2019, and most recent 3/22 has increased in size. Slow growth, however, we discussed that this could possibly be a malignancy but would not biopsy currently given its size could not rule out if false negative  and also high risk for PTX. \par -Encouraged her to follow through with repeat CT chest now as she was recommended to have it done 7/22 \par \par 7. Allergic rhinitis\par Continued sx--currently finishing steroid dose She can continue Dymista BID and Budesonide BID nasal rinse.\par \par 8. COVID-19 infection, resolved \par She tested positive for COVID in January 2022. She had weakness, headache, chest tightness. She did not require hospitalization. \par \par 9. Viral URI\par She currently has a cough and chest congestion, likely 2/2 viral URI. She tested negative for COVID-19 at urgent care and was given prednisone and reports improvement of symptoms. Current O2 sat 95%\par -check COVID-19 after 48 hours and if symptoms persist, she should keep repeating it every 48 hours. \par -If her symptoms worsen or her O2 sat goes below 88%, she should report to the ED for evaluation \par \par 10. Health maintenance\par Marce was administered Prevnar (13 valent) in May 2019. Pneumovax was administered on 8/5/21.\par \par Return to clinic: 3 months

## 2023-01-27 ENCOUNTER — NON-APPOINTMENT (OUTPATIENT)
Age: 68
End: 2023-01-27

## 2023-03-18 ENCOUNTER — APPOINTMENT (OUTPATIENT)
Dept: CT IMAGING | Facility: HOSPITAL | Age: 68
End: 2023-03-18

## 2023-03-18 ENCOUNTER — OUTPATIENT (OUTPATIENT)
Dept: OUTPATIENT SERVICES | Facility: HOSPITAL | Age: 68
LOS: 1 days | End: 2023-03-18
Payer: MEDICARE

## 2023-03-18 PROCEDURE — 71250 CT THORAX DX C-: CPT | Mod: 26,MH

## 2023-03-18 PROCEDURE — 71250 CT THORAX DX C-: CPT

## 2023-03-21 ENCOUNTER — NON-APPOINTMENT (OUTPATIENT)
Age: 68
End: 2023-03-21

## 2023-03-27 DIAGNOSIS — R91.8 OTHER NONSPECIFIC ABNORMAL FINDING OF LUNG FIELD: ICD-10-CM

## 2023-03-27 DIAGNOSIS — I28.1 ANEURYSM OF PULMONARY ARTERY: ICD-10-CM

## 2023-03-27 DIAGNOSIS — J43.2 CENTRILOBULAR EMPHYSEMA: ICD-10-CM
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
## 2023-06-08 ENCOUNTER — NON-APPOINTMENT (OUTPATIENT)
Age: 68
End: 2023-06-08

## 2023-06-19 ENCOUNTER — APPOINTMENT (OUTPATIENT)
Dept: ORTHOPEDIC SURGERY | Facility: CLINIC | Age: 68
End: 2023-06-19
Payer: MEDICARE

## 2023-06-19 VITALS — HEIGHT: 66 IN | BODY MASS INDEX: 29.25 KG/M2 | WEIGHT: 182 LBS

## 2023-06-19 DIAGNOSIS — M89.9 DISORDER OF BONE, UNSPECIFIED: ICD-10-CM

## 2023-06-19 DIAGNOSIS — M17.12 UNILATERAL PRIMARY OSTEOARTHRITIS, LEFT KNEE: ICD-10-CM

## 2023-06-19 DIAGNOSIS — M17.11 UNILATERAL PRIMARY OSTEOARTHRITIS, RIGHT KNEE: ICD-10-CM

## 2023-06-19 PROCEDURE — 20610 DRAIN/INJ JOINT/BURSA W/O US: CPT | Mod: 50

## 2023-06-19 PROCEDURE — 73562 X-RAY EXAM OF KNEE 3: CPT | Mod: 50

## 2023-06-19 PROCEDURE — 99214 OFFICE O/P EST MOD 30 MIN: CPT | Mod: 25

## 2023-06-20 NOTE — IMAGING
[Bilateral] : knee bilaterally [Moderate tricompartmental OA medial narrowing] : Moderate tricompartmental OA medial narrowing [Moderate tricompartmental OA lateral narrowing] : Moderate tricompartmental OA lateral narrowing [Advanced patellofemoral OA] : Advanced patellofemoral OA [de-identified] : Bilateral Knee Exam:                         	 \par \par General: no distress, no ligamentous laxity\par Skin: no significant pertinent finding\par Inspection: \par  Effusion: (mild Right)\par  Malalignment: (-)\par  Swelling: (-)\par  Quad atrophy: (-)\par  J-sign: (-)\par ROM: \par  0 - 130 degrees of flexion.\par Tenderness: \par  MJLT: (+)\par  MFC. (-)\par  LJLT: (+)\par  Medial patellar facet tenderness: -\par  Lateral patellar facet tenderness: -\par  Crepitus: (+\par  Patellar grind tenderness: (-)\par  Patellar tendon: (-)\par Stability: \par  Lachman: (-)\par  Varus/Valgus instability: (-)\par  Posterior drawer: (-)\par  Patellar translation: wnl\par Additional tests: \par  McMurrays test: (-)\par  Patellar apprehension: (-)\par  Patellar tilt: (-)\par  Tight lateral retinaculum: (-)\par Strength: 5/5 Q/H/TA/GS/EHL\par Neuro: In tact to light touch throughout, DTR's wnl\par Vascularity: Extremity warm and well perfused\par Gait: non antalgic\par \par   [FreeTextEntry9] : left femur distal third anterior bone lesion

## 2023-06-20 NOTE — DISCUSSION/SUMMARY
[de-identified] : bilatearl knee csi today. PT \par recommend MRI left femur eval bone lesion. \par \par The patient was advised of the diagnosis. The natural history of the pathology was explained in full. All questions were answered. The risks and benefits of conservative and interventional treatment alternatives were explained to the patient.\par \par ----------------------------------------------------------------------------\par \par The patient was advised that an advanced diagnostic/imaging study (MRI/CT/etc) will be ordered to evaluate potential pathology in the affected area(s).  The patient was further advised to follow up in the office to review the results of the study and determine further management that may be indicated.\par \par \par ----------------------------------------------------------------------------\par \par Large joint corticosteroid injection given: Bilateral knees\par \par Patient indicated for injection after trial of rest, OTC medications including aspirin, Ibuprofen, Aleve etc or prescription NSAIDS, and/or exercises at home and/ or physical therapy without satisfactory response.  Patient has symptoms including pain, swelling, and/or decreased mobility in the joint. The risks, benefits, and alternatives to corticosteroid injection were explained in full to the patient, including but not limited to infection, sepsis, bleeding, scarring, skin discoloration, temporary increase in pain, syncopal episode, failure to resolve symptoms, allergic reaction, symptom recurrence, and elevation of blood sugar in diabetics. Patient understood the risks. All questions were answered. After discussion of options, patient requested an injection. \par \par Oral informed consent was obtained and sterile technique was utilized for the procedure including the preparation of the solutions used for the injection and betadine followed by alcohol prep to the injection site. Anesthesia was given with ethyl chloride sprayed topically. The injection was delivered. Patient tolerated the procedure well. \par \par Post Procedure Instructions: Patient was advised to call if redness, pain, or fever occur and apply ice for 15 min on and 15 min off later today\par \par Medications delivered: Kenalog 10 mg, Lidocaine: 4 cc in each knee\par

## 2023-06-20 NOTE — HISTORY OF PRESENT ILLNESS
[10] : 10 [7] : 7 [Constant] : constant [] : yes [de-identified] : 06/19/2023: This is Ms. STEPH GARCIA, 68 year female presenting today for eval of bilateral knee pain R>L, for about a month. known OA for the last year. No recent trauma. notes knee swells and becomes stiff. Pain with getting in/out of car, stairs, bending down. Did b/l csi a year ago with about a month of relief. takes tylenol  / biofreeze with some help.\par \par on blood thinner [FreeTextEntry1] : bilateral knees [FreeTextEntry5] : Right knee is worse than left knee. Patient notes swelling in right knee [FreeTextEntry7] : right thigh [de-identified] : 9/1/22 [de-identified] : Dr. Askew

## 2023-06-28 RX ORDER — AZELASTINE HYDROCHLORIDE AND FLUTICASONE PROPIONATE 137; 50 UG/1; UG/1
137-50 SPRAY, METERED NASAL
Qty: 1 | Refills: 2 | Status: ACTIVE | COMMUNITY
Start: 2017-06-01 | End: 1900-01-01

## 2023-07-26 NOTE — ED ADULT TRIAGE NOTE - BP NONINVASIVE SYSTOLIC (MM HG)
-Presents with CP symptoms with typical and atypical features  -Serial troponin negative  -EKG reviewed, some T wave inversions noted  -Continue ASA, BB, CCB  -Check echo and MPI stress test    7/26/23  Stress test neg  Cont current CV meds  F/u in clinic  Likely noncardiac related pain   128

## 2023-09-11 ENCOUNTER — APPOINTMENT (OUTPATIENT)
Dept: CT IMAGING | Facility: CLINIC | Age: 68
End: 2023-09-11
Payer: MEDICARE

## 2023-09-11 PROCEDURE — 71250 CT THORAX DX C-: CPT

## 2023-09-12 ENCOUNTER — NON-APPOINTMENT (OUTPATIENT)
Age: 68
End: 2023-09-12

## 2023-09-13 ENCOUNTER — NON-APPOINTMENT (OUTPATIENT)
Age: 68
End: 2023-09-13

## 2023-09-14 ENCOUNTER — APPOINTMENT (OUTPATIENT)
Dept: PULMONOLOGY | Facility: CLINIC | Age: 68
End: 2023-09-14
Payer: MEDICARE

## 2023-09-14 ENCOUNTER — NON-APPOINTMENT (OUTPATIENT)
Age: 68
End: 2023-09-14

## 2023-09-15 ENCOUNTER — NON-APPOINTMENT (OUTPATIENT)
Age: 68
End: 2023-09-15

## 2023-09-18 ENCOUNTER — NON-APPOINTMENT (OUTPATIENT)
Age: 68
End: 2023-09-18

## 2023-09-19 ENCOUNTER — APPOINTMENT (OUTPATIENT)
Dept: PULMONOLOGY | Facility: CLINIC | Age: 68
End: 2023-09-19
Payer: MEDICARE

## 2023-09-19 VITALS
HEIGHT: 66 IN | TEMPERATURE: 97.7 F | BODY MASS INDEX: 27.32 KG/M2 | WEIGHT: 170 LBS | SYSTOLIC BLOOD PRESSURE: 119 MMHG | HEART RATE: 78 BPM | OXYGEN SATURATION: 95 % | DIASTOLIC BLOOD PRESSURE: 77 MMHG

## 2023-09-19 DIAGNOSIS — R05.9 COUGH, UNSPECIFIED: ICD-10-CM

## 2023-09-19 DIAGNOSIS — Z23 ENCOUNTER FOR IMMUNIZATION: ICD-10-CM

## 2023-09-19 PROCEDURE — 99215 OFFICE O/P EST HI 40 MIN: CPT

## 2023-09-20 ENCOUNTER — MED ADMIN CHARGE (OUTPATIENT)
Age: 68
End: 2023-09-20

## 2023-09-20 PROBLEM — Z23 ENCOUNTER FOR IMMUNIZATION: Status: ACTIVE | Noted: 2021-10-05 | Resolved: 2023-10-04

## 2023-09-21 ENCOUNTER — EMERGENCY (EMERGENCY)
Facility: HOSPITAL | Age: 68
LOS: 0 days | Discharge: HOME | End: 2023-09-21
Attending: STUDENT IN AN ORGANIZED HEALTH CARE EDUCATION/TRAINING PROGRAM
Payer: MEDICARE

## 2023-09-21 VITALS
WEIGHT: 173.94 LBS | TEMPERATURE: 98 F | SYSTOLIC BLOOD PRESSURE: 102 MMHG | HEART RATE: 99 BPM | DIASTOLIC BLOOD PRESSURE: 75 MMHG | HEIGHT: 66 IN | OXYGEN SATURATION: 94 % | RESPIRATION RATE: 19 BRPM

## 2023-09-21 VITALS
TEMPERATURE: 99 F | SYSTOLIC BLOOD PRESSURE: 104 MMHG | RESPIRATION RATE: 20 BRPM | DIASTOLIC BLOOD PRESSURE: 74 MMHG | OXYGEN SATURATION: 95 % | HEART RATE: 110 BPM

## 2023-09-21 DIAGNOSIS — R00.2 PALPITATIONS: ICD-10-CM

## 2023-09-21 DIAGNOSIS — Z87.09 PERSONAL HISTORY OF OTHER DISEASES OF THE RESPIRATORY SYSTEM: ICD-10-CM

## 2023-09-21 DIAGNOSIS — R53.83 OTHER FATIGUE: ICD-10-CM

## 2023-09-21 DIAGNOSIS — J44.9 CHRONIC OBSTRUCTIVE PULMONARY DISEASE, UNSPECIFIED: ICD-10-CM

## 2023-09-21 DIAGNOSIS — I48.91 UNSPECIFIED ATRIAL FIBRILLATION: ICD-10-CM

## 2023-09-21 DIAGNOSIS — I10 ESSENTIAL (PRIMARY) HYPERTENSION: ICD-10-CM

## 2023-09-21 DIAGNOSIS — E78.00 PURE HYPERCHOLESTEROLEMIA, UNSPECIFIED: ICD-10-CM

## 2023-09-21 LAB
ALBUMIN SERPL ELPH-MCNC: 3.7 G/DL — SIGNIFICANT CHANGE UP (ref 3.3–5)
ALP SERPL-CCNC: 49 U/L — SIGNIFICANT CHANGE UP (ref 40–120)
ALT FLD-CCNC: 19 U/L — SIGNIFICANT CHANGE UP (ref 12–78)
ANION GAP SERPL CALC-SCNC: 3 MMOL/L — LOW (ref 5–17)
AST SERPL-CCNC: 52 U/L — HIGH (ref 15–37)
BASOPHILS # BLD AUTO: 0.01 K/UL — SIGNIFICANT CHANGE UP (ref 0–0.2)
BASOPHILS NFR BLD AUTO: 0.1 % — SIGNIFICANT CHANGE UP (ref 0–2)
BILIRUB SERPL-MCNC: 1 MG/DL — SIGNIFICANT CHANGE UP (ref 0.2–1.2)
BUN SERPL-MCNC: 7 MG/DL — SIGNIFICANT CHANGE UP (ref 7–23)
CALCIUM SERPL-MCNC: 8.6 MG/DL — SIGNIFICANT CHANGE UP (ref 8.5–10.1)
CHLORIDE SERPL-SCNC: 111 MMOL/L — HIGH (ref 96–108)
CO2 SERPL-SCNC: 26 MMOL/L — SIGNIFICANT CHANGE UP (ref 22–31)
CREAT SERPL-MCNC: 0.7 MG/DL — SIGNIFICANT CHANGE UP (ref 0.5–1.3)
EGFR: 94 ML/MIN/1.73M2 — SIGNIFICANT CHANGE UP
EOSINOPHIL # BLD AUTO: 0.02 K/UL — SIGNIFICANT CHANGE UP (ref 0–0.5)
EOSINOPHIL NFR BLD AUTO: 0.2 % — SIGNIFICANT CHANGE UP (ref 0–6)
GLUCOSE SERPL-MCNC: 99 MG/DL — SIGNIFICANT CHANGE UP (ref 70–99)
HCT VFR BLD CALC: 42 % — SIGNIFICANT CHANGE UP (ref 34.5–45)
HGB BLD-MCNC: 14.2 G/DL — SIGNIFICANT CHANGE UP (ref 11.5–15.5)
IMM GRANULOCYTES NFR BLD AUTO: 0.3 % — SIGNIFICANT CHANGE UP (ref 0–0.9)
LYMPHOCYTES # BLD AUTO: 1.57 K/UL — SIGNIFICANT CHANGE UP (ref 1–3.3)
LYMPHOCYTES # BLD AUTO: 18.1 % — SIGNIFICANT CHANGE UP (ref 13–44)
MCHC RBC-ENTMCNC: 30.7 PG — SIGNIFICANT CHANGE UP (ref 27–34)
MCHC RBC-ENTMCNC: 33.8 G/DL — SIGNIFICANT CHANGE UP (ref 32–36)
MCV RBC AUTO: 90.9 FL — SIGNIFICANT CHANGE UP (ref 80–100)
MONOCYTES # BLD AUTO: 0.65 K/UL — SIGNIFICANT CHANGE UP (ref 0–0.9)
MONOCYTES NFR BLD AUTO: 7.5 % — SIGNIFICANT CHANGE UP (ref 2–14)
NEUTROPHILS # BLD AUTO: 6.39 K/UL — SIGNIFICANT CHANGE UP (ref 1.8–7.4)
NEUTROPHILS NFR BLD AUTO: 73.8 % — SIGNIFICANT CHANGE UP (ref 43–77)
NRBC # BLD: 0 /100 WBCS — SIGNIFICANT CHANGE UP (ref 0–0)
NT-PROBNP SERPL-SCNC: 241 PG/ML — HIGH (ref 0–125)
PLATELET # BLD AUTO: 251 K/UL — SIGNIFICANT CHANGE UP (ref 150–400)
POTASSIUM SERPL-MCNC: 4.8 MMOL/L — SIGNIFICANT CHANGE UP (ref 3.5–5.3)
POTASSIUM SERPL-SCNC: 4.8 MMOL/L — SIGNIFICANT CHANGE UP (ref 3.5–5.3)
PROT SERPL-MCNC: 8.2 GM/DL — SIGNIFICANT CHANGE UP (ref 6–8.3)
RBC # BLD: 4.62 M/UL — SIGNIFICANT CHANGE UP (ref 3.8–5.2)
RBC # FLD: 12.3 % — SIGNIFICANT CHANGE UP (ref 10.3–14.5)
SODIUM SERPL-SCNC: 140 MMOL/L — SIGNIFICANT CHANGE UP (ref 135–145)
TROPONIN I, HIGH SENSITIVITY RESULT: 5.6 NG/L — SIGNIFICANT CHANGE UP
WBC # BLD: 8.67 K/UL — SIGNIFICANT CHANGE UP (ref 3.8–10.5)
WBC # FLD AUTO: 8.67 K/UL — SIGNIFICANT CHANGE UP (ref 3.8–10.5)

## 2023-09-21 PROCEDURE — 93010 ELECTROCARDIOGRAM REPORT: CPT | Mod: 76

## 2023-09-21 PROCEDURE — 71045 X-RAY EXAM CHEST 1 VIEW: CPT | Mod: 26

## 2023-09-21 PROCEDURE — 99285 EMERGENCY DEPT VISIT HI MDM: CPT

## 2023-09-21 NOTE — ED PROVIDER NOTE - OBJECTIVE STATEMENT
68 F pmh HTN, COPD, asthma, Afib (on propafenone) presenting to the ED for palpitations. Patient states that she was feeling fatigued with an increased heart rate earlier this morning. She took her home medications but did not eat this morning. She denies chest pain, shortness of breath, nausea, vomiting, headache, diarrhea, fever. Pt has an upcoming appt with her cardiologist for a holter monitor placement (tomorrow)  no recent travel, leg swelling or trauma

## 2023-09-21 NOTE — ED ADULT NURSE NOTE - OBJECTIVE STATEMENT
Pt presents to ed from home c/o intermittent palpitations and dizziness since last night. Pmhx Afib, COPD, sleep apnea. Pt is home O2 dependent on 2L NC, cpap at night. Pt denies cp, sob, headache, rash, fever/chills, abdominal pain, back pain, flank pian, urinary s/s, weakness, numbness/tingling. 12 lead ekg in progress. Respirations even and unlabored at this time.

## 2023-09-21 NOTE — ED PROVIDER NOTE - NS ED ROS FT
General: Denies fever, chills  HEENT: Denies sensory changes, sore throat  Neck: Denies neck pain, neck stiffness  Resp: Denies coughing, SOB  Cardiovascular: Denies CP, LE edema; + palpitations  GI: Denies nausea, vomiting, abdominal pain, diarrhea, constipation, blood in stool  : Denies dysuria, hematuria, frequency, incontinence  MSK: Denies back pain  Neuro: Denies HA, dizziness, numbness, weakness  Skin: Denies rashes.

## 2023-09-21 NOTE — ED PROVIDER NOTE - NSICDXPASTMEDICALHX_GEN_ALL_CORE_FT
PAST MEDICAL HISTORY:  Asthma     Atrial fibrillation     Chronic sinusitis     COPD (chronic obstructive pulmonary disease)     Essential hypertension     GERD (gastroesophageal reflux disease)     Hypercholesterolemia     Sleep apnea

## 2023-09-21 NOTE — ED ADULT TRIAGE NOTE - CHIEF COMPLAINT QUOTE
palpitations since last night, denies pain, denies sob, patient normally sats around 94-95% on home oxygen 2L nasal cannula  hx of afib, copd, sleep apnea

## 2023-09-21 NOTE — ED PROVIDER NOTE - NSICDXPASTSURGICALHX_GEN_ALL_CORE_FT
no
PAST SURGICAL HISTORY:  H/O cardiac catheterization in 2012 in [facility]- denies intervention

## 2023-09-21 NOTE — ED PROVIDER NOTE - CONSIDERATION OF ADMISSION OBSERVATION
no indication for admission, labs wnl, symptoms resolved while in ED. Tachycardia resolved w/ fluids Consideration of Admission/Observation

## 2023-09-21 NOTE — ED PROVIDER NOTE - PATIENT PORTAL LINK FT
You can access the FollowMyHealth Patient Portal offered by [facility] by registering at the following website: [email or web address]. By joining [employer]’s FollowMyHealth portal, you will also be able to view your health information using other applications (apps) compatible with our system.

## 2023-09-21 NOTE — ED PROVIDER NOTE - CLINICAL SUMMARY MEDICAL DECISION MAKING FREE TEXT BOX
68 F presenting to the ED for palpitations    no chest pain, dyspnea    concern for Afib  symptoms resolved w/ fluids  labs wnl, trop negative  dispo home w/ outpatient cardiology follow-up

## 2023-09-21 NOTE — ED PROVIDER NOTE - PHYSICAL EXAMINATION
General: Well appearing female in no acute distress  HEENT: Normocephalic, atraumatic. Moist mucous membranes. Oropharynx clear. No lymphadenopathy.  Eyes: No scleral icterus. EOMI. YAAKOV.  Neck:. Soft and supple. Full ROM without pain. No midline tenderness  Cardiac: irregularly irregular rhythm. Peripheral pulses 2+ and symmetric. No LE edema.  Resp: Lungs CTAB. Speaking in full sentences.  Abd: Soft, non-tender, non-distended. No guarding or rebound. No scars, masses, or lesions.  Back: Spine midline and non-tender. No CVA tenderness.    Skin: No rashes, abrasions, or lacerations.  Neuro: AO x 3. Moves all extremities symmetrically. Motor strength and sensation grossly intact. ambulatory w/ steady gait

## 2023-09-21 NOTE — ED ADULT NURSE NOTE - NSFALLHARMRISKINTERV_ED_ALL_ED
Assistance OOB with selected safe patient handling equipment if applicable/Assistance with ambulation/Communicate risk of Fall with Harm to all staff, patient, and family/Encourage patient to sit up slowly, dangle for a short time, stand at bedside before walking/Monitor gait and stability/Orthostatic vital signs/Provide patient with walking aids/Provide visual cue: red socks, yellow wristband, yellow gown, etc/Reinforce activity limits and safety measures with patient and family/Bed in lowest position, wheels locked, appropriate side rails in place/Call bell, personal items and telephone in reach/Instruct patient to call for assistance before getting out of bed/chair/stretcher/Non-slip footwear applied when patient is off stretcher/[place] to call system/Physically safe environment - no spills, clutter or unnecessary equipment/Purposeful Proactive Rounding/Room/bathroom lighting operational, light cord in reach

## 2023-10-10 ENCOUNTER — NON-APPOINTMENT (OUTPATIENT)
Age: 68
End: 2023-10-10

## 2023-10-18 ENCOUNTER — NON-APPOINTMENT (OUTPATIENT)
Age: 68
End: 2023-10-18

## 2023-10-18 ENCOUNTER — APPOINTMENT (OUTPATIENT)
Dept: PULMONOLOGY | Facility: CLINIC | Age: 68
End: 2023-10-18
Payer: MEDICARE

## 2023-10-18 VITALS
TEMPERATURE: 98.1 F | HEART RATE: 78 BPM | HEIGHT: 66 IN | BODY MASS INDEX: 27.32 KG/M2 | OXYGEN SATURATION: 96 % | SYSTOLIC BLOOD PRESSURE: 102 MMHG | DIASTOLIC BLOOD PRESSURE: 68 MMHG | WEIGHT: 170 LBS | RESPIRATION RATE: 12 BRPM

## 2023-10-18 PROCEDURE — 99215 OFFICE O/P EST HI 40 MIN: CPT

## 2023-10-18 RX ORDER — AMLODIPINE BESYLATE 5 MG/1
5 TABLET ORAL
Refills: 0 | Status: ACTIVE | COMMUNITY

## 2023-10-19 ENCOUNTER — NON-APPOINTMENT (OUTPATIENT)
Age: 68
End: 2023-10-19

## 2023-10-19 LAB
C3 SERPL-MCNC: 108 MG/DL
C4 SERPL-MCNC: 18 MG/DL
CCP AB SER IA-ACNC: <8 UNITS
DSDNA AB SER-ACNC: 62 IU/ML
HIV1+2 AB SPEC QL IA.RAPID: NONREACTIVE
NT-PROBNP SERPL-MCNC: <36 PG/ML
RF+CCP IGG SER-IMP: NEGATIVE
RHEUMATOID FACT SER QL: <10 IU/ML
URATE SERPL-MCNC: 2.7 MG/DL

## 2023-10-20 ENCOUNTER — NON-APPOINTMENT (OUTPATIENT)
Age: 68
End: 2023-10-20

## 2023-10-20 LAB
ENA RNP AB SER IA-ACNC: <0.2 AL
ENA SCL70 IGG SER IA-ACNC: <0.2 AL

## 2023-10-24 ENCOUNTER — APPOINTMENT (OUTPATIENT)
Dept: SLEEP CENTER | Facility: HOME HEALTH | Age: 68
End: 2023-10-24

## 2023-10-24 LAB — ANA SER IF-ACNC: NEGATIVE

## 2023-10-27 ENCOUNTER — NON-APPOINTMENT (OUTPATIENT)
Age: 68
End: 2023-10-27

## 2023-10-27 ENCOUNTER — APPOINTMENT (OUTPATIENT)
Dept: PULMONOLOGY | Facility: CLINIC | Age: 68
End: 2023-10-27
Payer: MEDICARE

## 2023-10-27 PROCEDURE — 94010 BREATHING CAPACITY TEST: CPT

## 2023-10-27 PROCEDURE — 94618 PULMONARY STRESS TESTING: CPT

## 2023-10-27 PROCEDURE — 94727 GAS DIL/WSHOT DETER LNG VOL: CPT

## 2023-10-27 PROCEDURE — 94729 DIFFUSING CAPACITY: CPT

## 2023-10-27 PROCEDURE — ZZZZZ: CPT

## 2023-12-12 ENCOUNTER — NON-APPOINTMENT (OUTPATIENT)
Age: 68
End: 2023-12-12

## 2023-12-27 ENCOUNTER — NON-APPOINTMENT (OUTPATIENT)
Age: 68
End: 2023-12-27

## 2023-12-29 ENCOUNTER — NON-APPOINTMENT (OUTPATIENT)
Age: 68
End: 2023-12-29

## 2023-12-30 ENCOUNTER — APPOINTMENT (OUTPATIENT)
Dept: CT IMAGING | Facility: HOSPITAL | Age: 68
End: 2023-12-30

## 2024-01-04 ENCOUNTER — APPOINTMENT (OUTPATIENT)
Dept: PULMONOLOGY | Facility: CLINIC | Age: 69
End: 2024-01-04

## 2024-01-04 ENCOUNTER — NON-APPOINTMENT (OUTPATIENT)
Age: 69
End: 2024-01-04

## 2024-01-04 ENCOUNTER — EMERGENCY (EMERGENCY)
Facility: HOSPITAL | Age: 69
LOS: 1 days | Discharge: HOME | End: 2024-01-04
Admitting: EMERGENCY MEDICINE
Payer: MEDICARE

## 2024-01-04 VITALS
DIASTOLIC BLOOD PRESSURE: 74 MMHG | OXYGEN SATURATION: 99 % | SYSTOLIC BLOOD PRESSURE: 119 MMHG | TEMPERATURE: 98 F | RESPIRATION RATE: 16 BRPM | HEART RATE: 88 BPM

## 2024-01-04 PROCEDURE — 99283 EMERGENCY DEPT VISIT LOW MDM: CPT

## 2024-01-04 RX ORDER — ACETAMINOPHEN 500 MG
975 TABLET ORAL ONCE
Refills: 0 | Status: COMPLETED | OUTPATIENT
Start: 2024-01-04 | End: 2024-01-04

## 2024-01-04 RX ADMIN — Medication 975 MILLIGRAM(S): at 16:54

## 2024-01-04 NOTE — ED ADULT NURSE NOTE - COVID-19 ORDERING FACILITY
[facility]  - [facility]  - [facility]

## 2024-01-04 NOTE — ED PROVIDER NOTE - NSICDXPASTSURGICALHX_GEN_ALL_CORE_FT
PAST SURGICAL HISTORY:  H/O cardiac catheterization in 2012 in [facility]- denies intervention     PAST SURGICAL HISTORY:  H/O cardiac catheterization in 2012 in [facility]- denies intervention

## 2024-01-04 NOTE — ED PROVIDER NOTE - OBJECTIVE STATEMENT
69 y/o female hx COPD HTN afib on pradaxa presents to ER c/o left ear bleeding. Pt. states for the past few days has been experiencing URI symptoms including cough congestion and bl ear fullness - states has been blwoiing her nose frequently and forcefully - went to urgent care today - was told they saw some bleeding in her left ear  - placed on augmentin and sent her to ER for further evaluation. Pt. c/o pain to bl ears L>R. Denies trauma to area or using qtips at all. Denies hearing changes. Denies headache nausea vomit weakness dizziness. Denies CP SOB> 67 y/o female hx COPD HTN afib on pradaxa presents to ER c/o left ear bleeding. Pt. states for the past few days has been experiencing URI symptoms including cough congestion and bl ear fullness - states has been blwoiing her nose frequently and forcefully - went to urgent care today - was told they saw some bleeding in her left ear  - placed on augmentin and sent her to ER for further evaluation. Pt. c/o pain to bl ears L>R. Denies trauma to area or using qtips at all. Denies hearing changes. Denies headache nausea vomit weakness dizziness. Denies CP SOB>

## 2024-01-04 NOTE — ED ADULT TRIAGE NOTE - CHIEF COMPLAINT QUOTE
Pt presents for bleeding to Lt ear starting this morning. Pt on Prodaxa for history of a.fib, denies any chest pain/shortness of breath, no dizziness/headache, afebrile.

## 2024-01-04 NOTE — ED PROVIDER NOTE - COVID-19 ORDERING FACILITY

## 2024-01-04 NOTE — ED PROVIDER NOTE - NSFOLLOWUPINSTRUCTIONS_ED_ALL_ED_FT
Eardrum Rupture       An eardrum rupture is a tear (perforation) that makes a hole in the eardrum. The eardrum is a thin, round tissue inside the ear. It allows you to hear. This condition often heals on its own. There is often little or no long-term hearing loss.  Eardrum rupture can be caused by an infection, an injury, long-term ear problems, or surgery on the ear. This condition can cause you to have:  •Pain.      •Ringing in the ear.      •Fluid leaking from the ear.      •Hearing loss.      •Dizziness.        Follow these instructions at home:    Medicines     •Take over-the-counter and prescription medicines only as told by your doctor.      •If you were prescribed an antibiotic medicine, use it as told by your doctor. Do not stop using the antibiotic even if you start to feel better.      Caring for your ear   •Keep your ear dry while it heals:  •Do not let your head go under water.       •Do not swim or dive until your doctor says it is okay.        •Before you take a bath or shower, place a waterproof earplug in your ear to keep water out of your ear.      •When you play sports in which ear injuries may happen, wear a headgear with ear protection.      Managing pain and swelling   •If told, apply heat to the affected ear. Use a moist heat pack, or a heating pad, or another heat source that your doctor tells you.   •Place a towel between your skin and the heat source.       •Leave the heat on for 20–30 minutes.       •Remove the heat if your skin turns bright red. You must remove the heat if you are unable to feel pain, heat, or cold. You are more likely to get burned.        General instructions     •Continue your normal activities after your eardrum heals. Your doctor will tell you when your eardrum has healed.      •Talk to your doctor before you fly on an airplane.      •Keep all follow-up visits as told by your doctor. This is important.        Contact a doctor if you:    •Have mucus, blood, or pus leaking from your ear.      •Have a fever.      •Have ear pain.      •Cannot hear.      •Have ringing in the ear.      •Feel dizzy.        Get help right away if you:    •Have sudden hearing loss.      •Become very dizzy.      •Get very bad pain in your ear.      •Have weakness in your face.      •Cannot move parts of your face.        Summary    •An eardrum rupture is a tear that makes a hole in the eardrum.      •The eardrum will likely heal on its own within a few weeks.      •Follow instructions from your doctor about how to keep your ear dry and protected as it heals.      This information is not intended to replace advice given to you by your health care provider. Make sure you discuss any questions you have with your health care provider.

## 2024-01-04 NOTE — ED PROVIDER NOTE - PATIENT PORTAL LINK FT
You can access the FollowMyHealth Patient Portal offered by [facility] by registering at the following website: [email or web address]. By joining [employer]’s FollowMyHealth portal, you will also be able to view your health information using other applications (apps) compatible with our system. You can access the FollowMyHealth Patient Portal offered by [facility] by registering at the following website: [email or web address]. By joining [employer]’s FollowMyHealth portal, you will also be able to view your health information using other applications (apps) compatible with our system.

## 2024-01-04 NOTE — ED PROVIDER NOTE - LEFT EAR
+erythema/bleeding noted to TM at 6oclock region with breakdown of tm tissue. no EAC swelling or dc.

## 2024-01-04 NOTE — ED ADULT NURSE NOTE - NSFALLUNIVINTERV_ED_ALL_ED
Bed/Stretcher in lowest position, wheels locked, appropriate side rails in place/Call bell, personal items and telephone in reach/Instruct patient to call for assistance before getting out of bed/chair/stretcher/Non-slip footwear applied when patient is off stretcher/[place] to call system/Physically safe environment - no spills, clutter or unnecessary equipment/Purposeful proactive rounding/Room/bathroom lighting operational, light cord in reach Bed/Stretcher in lowest position, wheels locked, appropriate side rails in place/Call bell, personal items and telephone in reach/Instruct patient to call for assistance before getting out of bed/chair/stretcher/Non-slip footwear applied when patient is off stretcher/[place] to call system/Physically safe environment - no spills, clutter or unnecessary equipment/Purposeful proactive rounding/Room/bathroom lighting operational, light cord in reach

## 2024-01-04 NOTE — ED PROVIDER NOTE - CLINICAL SUMMARY MEDICAL DECISION MAKING FREE TEXT BOX
67 y/o female sent from urgent care for L ear bleeding  -likely tm perf or barotrauma vs infection  -continue augmentin  -ENT follow up

## 2024-01-04 NOTE — ED ADULT NURSE NOTE - OBJECTIVE STATEMENT
Pt arrives to intake 7. Pt is A and O(x4 and ambulatory. Hx: Afib, COPD, high cholesterol, GERD. Pt arrives to the ED complaining of left ear bleeding that started this morning. Pt is taking blood thinner for afib. Pt denies chest pain, palpitations, headaches, shortness of breath. Airway is patent, respirations are even and unlabored. Pt endorses having shingles on the left breast and back of the left side. PA made aware. Pt denies pain. Pt denies GI/ complaints. Awaiting provider orders. Plan of care ongoing, safety maintained.

## 2024-01-06 ENCOUNTER — OUTPATIENT (OUTPATIENT)
Dept: OUTPATIENT SERVICES | Facility: HOSPITAL | Age: 69
LOS: 1 days | End: 2024-01-06
Payer: MEDICARE

## 2024-01-06 ENCOUNTER — APPOINTMENT (OUTPATIENT)
Dept: CT IMAGING | Facility: HOSPITAL | Age: 69
End: 2024-01-06

## 2024-01-06 PROCEDURE — G1004: CPT

## 2024-01-06 PROCEDURE — 71250 CT THORAX DX C-: CPT | Mod: 26,MG

## 2024-01-06 PROCEDURE — 71250 CT THORAX DX C-: CPT | Mod: MG

## 2024-01-09 ENCOUNTER — RX CHANGE (OUTPATIENT)
Age: 69
End: 2024-01-09

## 2024-01-09 ENCOUNTER — NON-APPOINTMENT (OUTPATIENT)
Age: 69
End: 2024-01-09

## 2024-01-09 DIAGNOSIS — J18.9 PNEUMONIA, UNSPECIFIED ORGANISM: ICD-10-CM

## 2024-01-09 RX ORDER — LEVOFLOXACIN 500 MG/1
500 TABLET, FILM COATED ORAL DAILY
Qty: 14 | Refills: 0 | Status: ACTIVE | COMMUNITY
Start: 2024-01-09 | End: 1900-01-01

## 2024-01-10 ENCOUNTER — RX CHANGE (OUTPATIENT)
Age: 69
End: 2024-01-10

## 2024-01-23 ENCOUNTER — APPOINTMENT (OUTPATIENT)
Dept: PULMONOLOGY | Facility: CLINIC | Age: 69
End: 2024-01-23
Payer: MEDICARE

## 2024-01-23 VITALS
DIASTOLIC BLOOD PRESSURE: 78 MMHG | HEART RATE: 81 BPM | OXYGEN SATURATION: 94 % | WEIGHT: 163 LBS | SYSTOLIC BLOOD PRESSURE: 120 MMHG | BODY MASS INDEX: 26.2 KG/M2 | RESPIRATION RATE: 12 BRPM | HEIGHT: 66 IN | TEMPERATURE: 97.5 F

## 2024-01-23 VITALS
SYSTOLIC BLOOD PRESSURE: 120 MMHG | DIASTOLIC BLOOD PRESSURE: 78 MMHG | OXYGEN SATURATION: 94 % | BODY MASS INDEX: 26.2 KG/M2 | RESPIRATION RATE: 12 BRPM | HEIGHT: 66 IN | HEART RATE: 81 BPM | WEIGHT: 163 LBS

## 2024-01-23 DIAGNOSIS — J45.909 UNSPECIFIED ASTHMA, UNCOMPLICATED: ICD-10-CM

## 2024-01-23 PROCEDURE — 99215 OFFICE O/P EST HI 40 MIN: CPT

## 2024-01-23 NOTE — PROCEDURE
[FreeTextEntry1] : PFT 10/27/23 FVC: 2.51 L (96%)--> 2.85 L (108%) FEV1: 1.10 L (54%)--> 1.18 L (58%) FEV1/FVC: 44%--> 41% CER16-71%: 0.35 L/s (19%)--> 0.37 L/s (19%) T.92 L (93%) RV/T% DLCO: 6.22 (27%)  6MWT 10/27/23: 293 meters, Spo2 95% -> 85%, given 2 L o2 -> 89%.   Chest CT 23 IMPRESSION: A 5 mm solid nodule within right apex (series 3 image 52) new from prior exam. Recommend follow-up chest CT in 6 months to determine stability. Stable previously measured 7 mm indistinct subsolid opacity within right apex (series 3 image 57).  Chest CT 3/18/23 IMPRESSION: 1. 7 mm round glass opacity within the apical segment of the right upper lobe (5:96) slightly more conspicuous in size and opacity when compared to 2019 when it measured 5 mm. Continued short interval surveillance is suggested in this high risk individual, in 3-6 months to confirm stability. 2. Additional stable solid micronodules. 3. Stable substantial paraseptal as well as severe, confluent and advanced destructive emphysema again noted.  PFT 3/31/22 FVC: 2.62 L (97%) FEV1: 1.05 L (50%) FEV1/FVC: 40% FEF 25-75%: 0.30 L/s (16%) T.69 L (89%) RV/T% DLCO: 6.95 (30%)  6MWT <date> Patient walked meters during a 6 minute walk test. Resting O2 saturation was % on RA.  Heart rate bpm. End test O2 saturation was % on RA.  Heart rate bpm.  Ivis scale end of test  This signifies    PFT 20 FVC: 2.58 L (94%)--> 2.49 L (91%)   FEV1: 1.06 L (50%)--> 1.06 L (50%)   FEV1/FVC: 41%--> 42% IAT64-66%: 0.31 L/s (16%)--> 0.32 L/s (16%) T.83 L (92%) RV/T% DLCO: 6.9 (32%)  6MWT 20 Patient walked 299 meters during a 6 minute walk test. Resting O2 saturation was 95% on RA.  Heart rate 76 bpm. Patient dropped to 85% on room air at 2 minutes. Patient given 2L 02 via nasal cannula. End test O2 saturation was 89% on 2 liters O2.  Heart rate 102 bpm.  Ivis scale end of test : 0 This signifies reduced walk distance and significant desaturation  Ultrasound 20 - B-lines observed curtain sign noted. No evidence of pleural effusion. Diaphragmatic movement normal.   Cardiac cath 20 PA pressures 38/16 with mean 26. Pulmonary capillary occlusion pressure 14.0 Cardiac output 5.8. Diastolic gradient 2.0. Transpulmonary pressure gradient 12.0 (both normal). PVR (/5.8) 2.0.  [place], DLCO 19: FVC: 2.71 L (85%) FEV1: 1.26 L (50%) FEV1/FVC: 47% IWE16-97%: 0.45 L/s (17%) DLCO: 8.7 (38%) 6MWT: She declined 6MWT today. Severe obstructive lung defect. Severely decreased diffusion capacity.   EXAM: CT CHEST  PROCEDURE DATE: 2019  COMPARISON: CT chest 2017.  Lungs and large airways: Again seen is diffuse centrilobular and paraseptal emphysema.  There has been a decrease in size of previously seen 0.4 mm left upper lobe nodule, now measuring 3 mm (series 4 image 96).  There is a 4 mm nodule in the superior portion of the left lower lobe (series 4 image 186).  Pleura: No pleural effusion.  Mediastinum and hilar regions: No thoracic lymphadenopathy.  Heart and pericardium: Heart size is normal. No pericardial effusion. Severe coronary artery disease.  Vessels: Mild calcified plaque aorta. Again seen is enlargement of the main pulmonary artery measuring 3.8 cm.  Chest wall and lower neck: Normal.  Upper abdomen: Status post cholecystectomy. Subcentimeter hypodensity in right lobe of liver too small to characterize although likely representing small cyst.  Bones: Mild degenerative changes.  IMPRESSION:  1. Diffuse emphysematous changes.  2. One or two micronodules and nodules (measuring <6mm), stable from 2017. Given risk factors 12 months follow-up low dose CT screening is recommended.  3. No change in dilation of the main pulmonary artery which could be a sequela pulmonary hypertension.   [place], DLCO, 6MWT 18: FVC: 2.61 L (93%) FEV1: 1.22 L (56%) FEV1/FVC: 47% OYG31-99%: 0.39 L/s (19%) DLCO: 7.5 (32%) 6MWT: 240 meters, SpO2 start; 95% --> spO2 end: 85% Moderately severe obstructive defect. Severely reduced diffusion capacity.  6MWT was aborted at 4 minutes due to desaturation; pt also notes walk distance was limited secondary to knee pain. Reduced walk distance with significant desaturation.   EXAM:  ECHOCARDIOGRAM (CARDIOL) 2018   Normal left ventricular size and wall thickness.The left ventricular wall  motion is normal.The left ventricular ejection fraction is normal.The left ventricular ejection fraction is 60%.The right ventricle is normal in size and function.The left atrial size is normal.Right atrial size is normal.Structurally normal aortic valve.No aortic regurgitation noted.Mildly calcified anterior leaflet of the mitral valve.There is mild to moderate mitral regurgitation.Structurally normal tricuspid valve.There is trace to mild tricuspid regurgitation.There is mild pulmonary hypertension.The pulmonary artery systolic pressure is estimated to be 40 mmHg.Structurally normal pulmonic valve.There is trace pulmonic regurgitation.There is no  pericardial effusion.There is no significant change when compared to the echo from 2017.  Spirometry FVC: 2.83 100% FEV1: 1.19 54% FEV1/FVC: 42% DLCO: 31%  AAT: WNL (117)  Echo: 17: EF 55-60%, mild PHTN with PASP 40mmHg  Spirometry and DLCO 17: FVC: 2.71 L (95% pred) --> 2.58 L (90% pred) FEV1: 1.20 L (54% pred) --> 1.14 L (51% pred) FEV1/FVC: 44% --> 44% [id number]%: 0.30 L/s (14% pred) --> 0.35 L/s (16% pred) DLCO: 8.0 (32% pred) PI max 17: [id number] (55% pred) 6MWT: 382m, SpO2 start: 95% --> SpO2 end: 88% Severe obstruction. Severely reduced diffusion capacity. Vastly improved walk distance (about 300m more than last) and improvement in degree of desaturation (but still with significant desaturation).   PFT 3/28/17: FVC: 2.39 L (83% pred) --> 2.22 L (77% pred) FEV1: 1.14 L (50% pred) --> 1.09 L (48% pred) FEV1/FVC: 48% --> 49% [id number]%: 0.42 L/s (19% pred) --> 0.40 L/s (18% pred) T.84 L (92% pred) RV/T% DLCO: 8.1 (33% pred) 6MWT 3/28/17: Pt declined due to pain from heel spur Severe obstructive defect. Normal lung volumes. Very severely reduced DLCO.  Air trapping.   [place] with bronchodilator 16: FVC: 2.34 L (83% pred) --> 2.67 L (94% pred) FEV1: 1.07 L (48% pred) --> 1.24 L (56% pred) FEV1/FVC: 46% --> 46% [id number]%: 0.38 L/s (18% pred) --> 0.41 L/s (19% pred) 330cc increase in FVC post-bronchodilator. Moderate obstruction.   CXR 16: pt somewhat rotated, haziness in the LLL which is also seen on the lateral film, cannot rule out pneumonia of the LLL  Spirometry 16: FVC: 2.71 L (94% pred) FEv1: 1.23 L (55% pred) FEV1/FVC: 46% [id number]%: 0.42 L/s (19% pred) DLCO 16: 9.3 (33% pred <-- 29%) 6MWT 16: Walk time: 2:00, 86 meters, SpO2 start: 89% --> SpO2 end: 82% Improvement in FEV1 and FVC as compared to prior test.  Severe obstructive lung disease. Severely reduced diffusion capacity. Reduced walk distance but test aborted early due to desaturation.  Degree of desaturation improved since previous test.   PFTs 16 FVC: 1.71 (59%) FEV1: 0.98 (44%) FEV1/FVC 58% FEF25/75: 0.59 (27%) T.38 (83%) DLCO: 7.3 (29%) Severe obstructive lung disease with severe reduced DLCO. No significant bronchodilator response  PFTs 16 FVC: 2.57 89% FEV1: 1.19 52% FEV1/FVC: 46% DLCO 37% 6MWT: 86 meters, Baseline: 88% End: 74%  PFTs 12/17/15 FVC: 2.73 (94% of pred) FEV1: 1.31 l (57% of pred) FEV1/FVC: 48% [id number]%: 0.36 L/s (59% of pred) % DLCO 43% Pt declining 6MWT today   Spirometry: FVC: 2.46 L (86% of pred) FEV1: 1.26 l (56% of pred) FEV1/FVC: 51.2% CXV25-06%: 0.64 L/s (29% of pred) C/w moderate obstructive pulmonary disease.  RHC 10/13/15: -Pulmonary capillary occlusion pressure: 17 mm of Hg -Pulmonary artery systolic: 40 mmHg -Pulmonary artery diastolic: 20mm Hg -Mean pulmonary artery pressure: 29 mm Hg -Right atrial pressure: 12mm Hg -Cardiac output was 4.43 Based upon these numbers, the pulmonary artery diastolic and the wedge gradient is normal at 3. The trans-pulmonary pressure gradient is mildly elevated at 12. With a cardiac output of 4.43 L per minute, the pulmonary vascular resistance is normal at slightly less than 3 Woods units.  There is no increase in intrinsic pulmonary vascular resistance. The pulmonary artery pressures are also not elevated out of proportion to the left-sided pressures therefore the patient is not a candidate for advanced vasodilator therapy.  TTE 12/19/15: LVH, nomal LVEF PA sys press 34mmHg, mild MR & TR  CT chest CTAPE 08/10/15 IMPRESSION: No evidence for pulmonary emboli. No significant change in moderate emphysematous changes. No significant change in main pulmonary arterial dilatation, possibly reflecting sequelae of pulmonary arterial hypertension.

## 2024-01-23 NOTE — PHYSICAL EXAM
[TextBox_68] : diminished breath sounds at the bases, no wheezing or rhonchi noted [TextBox_125] : Dermatomal involvement of the skin on the left hand side, suggestive of healed herpes zoster lesions

## 2024-01-23 NOTE — DISCUSSION/SUMMARY
[FreeTextEntry1] : ATTENDING'S SUMMARY 1-23-24: no pallor, no icterus no JVD, no hepatojugular reflux, no HSM no cervical adenopathy, no supraclavicular adenopathy normal s1/s2, no murmurs, rubs or gallops diminished breath sounds at the bases, no wheezing or rhonchi noted Dermatomal involvement of the skin on the left hand side, suggestive of healed herpes zoster lesions no cyanosis, no clubbing, no articular manifestations, no thickening of the skin no pedal edema

## 2024-01-23 NOTE — REVIEW OF SYSTEMS
[Fever] : no fever [Wheezing] : no wheezing [Chest Tightness] : no chest tightness [Chest Discomfort] : no chest discomfort [Palpitations] : no palpitations [Edema] : no edema [TextBox_94] : herniated disc

## 2024-01-23 NOTE — ADDENDUM
[FreeTextEntry1] :    I, Dr. Larry Epps, personally performed the evaluation and management (E/M) services for this established patient who presents today with (a) new problem(s)/exacerbation of (an) existing condition(s). That E/M includes conducting the clinically appropriate interval history &/or exam, assessing all new/exacerbated conditions, and establishing a new plan of care. Today, my FLACO, Ms. Sharon Burdick was here to observe my evaluation and management service for this new problem/exacerbated condition and follow the plan of care established by me going forward.

## 2024-01-23 NOTE — HISTORY OF PRESENT ILLNESS
[TextBox_4] : 67 yo AAF with Asthma/COPD presents for follow-up. Pt with diffuse centrilobular emphysema on triple therapy and daliresp, pulmonary HTN with PAS 43mmHg on RHC on sildenafil, stable pulmonary nodules, and severe exercise restriction on home o2.   1-23-24: She has not had exacerbations of COPD since starting Daliresp  9-14-23: Her oxygen level at another physician's office was 88%. She was sitting and resting Her ribs were bothering her. She had a CT   1-24-23: Has a cough and congestion that started 4 days ago. She went to [facility] urgent care and tested negative for COVID-19. She was prescribed a course of prednisone and says that her symptoms are improving overall but not fast enough. She sometimes finds it hard to breath. Her O2 sat this morning on her pulse oximeter was 95%. It usually ranges from 94 - 95%. She is scheduled for CT appointment.   7-21-22: Has been doing well overall and breathing is stable. Using all her medications and has only needed to use the xopenex rarely (last use 2 days ago but proir to that had not used in a while.) her exercise capacity is stable--continues to have difficulty with stairs. Using home O2 on exertion prn. Using her CPAP nightly. Only recently allergies have been starting to bother her--sx of cough, postnasal drip, congestion with the change of season and heat. Went to urgent care a few days ago and was prescribed "allergy pill" and 5 day course of steroids that is about to complete.  Using budesonide BID. No recent hospitalizations. Denies wheezing or shortness of breath, dizziness, swelling in legs.  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  Continues on Breo 200-25, Dailiresp and Incruse. She using Xopenex less often. She is needing the Xopenex 3 times a day for chest tightness and shortness of breath. Normally she uses it like once a day. Feels her asthma is acting up lately with weather changes.  Continues on sildenafil (40, 20, 40 mg). Tolerating medication well.  Had echo with Dr. Vasquez and cardiac cath on 8/24/20.  On CPAP for GEO & using throughout the night. No fevers or chills.  Denies chest pain or palpitations.  She has herniated disc in her back

## 2024-01-23 NOTE — ASSESSMENT
[FreeTextEntry1] : 1-23-24:  It was a pleasure to see Marce in follow-up today. Her respiratory issues are summarized:  1. Moderately obstructive airways disease (GOLD stage C- high risk because of exacerbations) and asthma Her alpha 1 antitrypsin levels are normal. This was done since she has extensive emphysema B/L in both upper and lower lobes. She is using Breo and Incruse due to her reduced inspiratory flows. Additionally, since she was having frequent exacerbations of COPD in the past, she was started on Daliresp, which has reduced the frequency of AECOPD. She has not had a COPD exacerbation or hospitalization since starting Daliresp. Diffuse emphysematous changes were visible on CT chest.  She has been evaluated by Dr. Jose Arzate at the [facility] as well as at [facility]  PFT 3/31/22 shows stable FVC and FEV1. There is severe obstructive lung defect, FEV1/FVC: 40% and severely reduced diffusion capacity (32%). Lung volumes were normal (89%). Compared to Dec 2020, diffusion capacity is stable. Her 6MWT distance is significantly reduced from 299 meters to 192. Resting O2 saturation was 97% on RA. Patient dropped to 77% on room air at 3 minutes. Patient given 3L 02 via nasal cannula. SpO2 on 3 liters went to 94%. She continues on Breo 200-25, Daliresp and Incruse. Her exercise tolerance is now stable at 1-2 blocks. Using oxygen 2 L/min intermittently throughout the day for SOB or when she is exerting herself (e.g. house chores). Sputum culture AFB 8/5 growing Mycobacterium Parascrofulaceum Bacterial and fungal cultures are negative  Her CT imaging show both centrilobular and paraseptal emphysema with coalescing areas of destructive emphysema more prominent in the upper lobes bilaterally. The appearance of such changes are with a slightly thicker 'wall' as would be expected in emphysema, and is reminiscent of cyst-like structures, raising concern for conditions such as Pulmonary Langerhans Cell Histiocytosis. Alpha 1 antitrypsin was checked and negative in 2017.  High altitude stimulation testing High was performed. Resting O2 sat was 94% After 18 minutes of breathing 15% oxygen, end O2 sat decreased to 86%. Patient was given 2L o2 via nasal cannula. End O2 sat was 92% on 2 liters O2. I advised Marce she must fly with oxygen.  Plan: - Check CBC for eosinophil count, IgE level - Continue Breo, Incruse, Daliresp and PRN Xopenex - No need to add on Azithromycin, as she has remained without exacerbations with the initiation of Daliresp - she is not a candidate for endobronchial valve or volume reduction surgery given diffuse disease - She was evaluated by Dr. Marleny Nettles / Dr. 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 It is currently unclear the necessity of treating such a NTB, and given the improvement in her respiratory symptoms without such treatment, it becomes even less necessary. Repeat sputum culture on 10/28/21 is growing Mycobacterium Brisbanense.  Plan: -collect sputum today  3. Dyspnea on exertion Her symptoms are much improved and she is currently back to her baseline exercise tolerance. Ultrasound performed 9/29/20 showed B-lines at bases w/ curtain sign noted. No evidence of pleural effusion on right or left side. Diaphragmatic movement normal. Autoimmune serologies were done on 9/29/20. Double stranded DNA Ab was positive (83), but C4, Butler ab, RNP ab, YOMI, CH50, C3, CCP ab, BNP, RF, ESR, d-dimer were negative.  Plan: - Plan as above  4. Pneumonia Left lower lobe consolidation seen on CT is new. She didn't have systemic symptoms, but she did have respiratory symptoms. She was treated with Levaquin 500mg x 14 days. Recommend repeat CT in 6 weeks (3rd week of February)  5. Pulmonary nodules RUL nodule has not increased in size, solid component (3mm) has not increased in size, adjacent to areas of centrilobular emphysema. Repeat CT in 6 months  6. Pulmonary hypertension and hypoxemic respiratory failure Marce likely falls in the 10% of patients with COPD who develop pulmonary hypertension. She tried Adcirca in the past but was unable to tolerate it due to headaches. She is currently on Sildenafil 40mg TID. Tolerating medication well without sx.  She had right heart catheterization in Dr. Vasquez's office. PA pressures 38/16 with mean 26. Pulmonary capillary occlusion pressure 14.0 Cardiac output 5.8. Diastolic gradient 2.0. Transpulmonary pressure gradient 12.0 (both normal). PVR (12/5.8) 2.0. Based upon the right heart catheterization findings, there is no evidence of precapillary pulmonary hypertension at rest. Although, because this was an office bedside echocardiogram, we likely won't be able to measure right-sided pressures which is what we are most interested in. The pulmonary artery measures 3.8 cm on CT. She appears to have cysts in the lungs not commonly seen in patients with emphysema. Whether she has a component of Pulmonary Langerhans Cell Histiocytosis, we want to optimize her pulmonary hypertension treatment  Plan: - Continue Sildenafil, 40mg TID - Referred Marce to our pulmonary hypertension specialist, Dr. Sadie De Paz for consideration of repeat right heart catheterization - We will continue to monitor her cardiopulmonary symptoms  7. GEO Marce has GEO and is using CPAP nightly.   8. Pulmonary nodules RUL nodule has not increased in size, solid component (3mm) has not increased in size, adjacent to areas of centrilobular emphysema. Repeat CT in 6 months  9. Allergic rhinitis Continued sx--currently finishing steroid dose She can continue Dymista BID and Budesonide BID nasal rinse.  10. Health maintenance Marce was administered Prevnar (13 valent) in May 2019. Pneumovax was administered on 8/5/21. Prevnar 20 administered on prior visit  Return to clinic: 3 months (PFT, 6MWT prior).

## 2024-01-24 ENCOUNTER — NON-APPOINTMENT (OUTPATIENT)
Age: 69
End: 2024-01-24

## 2024-01-24 ENCOUNTER — APPOINTMENT (OUTPATIENT)
Dept: PULMONOLOGY | Facility: CLINIC | Age: 69
End: 2024-01-24

## 2024-01-24 LAB
ALBUMIN SERPL ELPH-MCNC: 4.4 G/DL
ALP BLD-CCNC: 49 U/L
ALT SERPL-CCNC: 9 U/L
ANION GAP SERPL CALC-SCNC: 13 MMOL/L
AST SERPL-CCNC: 17 U/L
BASOPHILS # BLD AUTO: 0.02 K/UL
BASOPHILS NFR BLD AUTO: 0.3 %
BILIRUB SERPL-MCNC: 0.5 MG/DL
BUN SERPL-MCNC: 7 MG/DL
CALCIUM SERPL-MCNC: 9.2 MG/DL
CHLORIDE SERPL-SCNC: 108 MMOL/L
CO2 SERPL-SCNC: 22 MMOL/L
CREAT SERPL-MCNC: 0.58 MG/DL
EGFR: 98 ML/MIN/1.73M2
EOSINOPHIL # BLD AUTO: 0.07 K/UL
EOSINOPHIL NFR BLD AUTO: 1.1 %
GLUCOSE SERPL-MCNC: 84 MG/DL
HCT VFR BLD CALC: 41.9 %
HGB BLD-MCNC: 13.4 G/DL
IMM GRANULOCYTES NFR BLD AUTO: 0.2 %
LYMPHOCYTES # BLD AUTO: 2.3 K/UL
LYMPHOCYTES NFR BLD AUTO: 36.3 %
MAN DIFF?: NORMAL
MCHC RBC-ENTMCNC: 30.7 PG
MCHC RBC-ENTMCNC: 32 GM/DL
MCV RBC AUTO: 96.1 FL
MONOCYTES # BLD AUTO: 0.46 K/UL
MONOCYTES NFR BLD AUTO: 7.3 %
NEUTROPHILS # BLD AUTO: 3.47 K/UL
NEUTROPHILS NFR BLD AUTO: 54.8 %
PLATELET # BLD AUTO: 239 K/UL
POTASSIUM SERPL-SCNC: 3.9 MMOL/L
PROT SERPL-MCNC: 7.7 G/DL
RBC # BLD: 4.36 M/UL
RBC # FLD: 12 %
SODIUM SERPL-SCNC: 143 MMOL/L
WBC # FLD AUTO: 6.33 K/UL

## 2024-01-25 ENCOUNTER — NON-APPOINTMENT (OUTPATIENT)
Age: 69
End: 2024-01-25

## 2024-01-25 LAB — TOTAL IGE SMQN RAST: 5 KU/L

## 2024-01-26 LAB — BACTERIA SPT CULT: NORMAL

## 2024-02-07 ENCOUNTER — NON-APPOINTMENT (OUTPATIENT)
Age: 69
End: 2024-02-07

## 2024-02-21 LAB — FUNGUS SPT CULT: ABNORMAL

## 2024-03-02 ENCOUNTER — INPATIENT (INPATIENT)
Facility: HOSPITAL | Age: 69
LOS: 0 days | Discharge: HOME | End: 2024-03-03
Attending: INTERNAL MEDICINE | Admitting: INTERNAL MEDICINE
Payer: MEDICAID

## 2024-03-02 VITALS
TEMPERATURE: 99 F | SYSTOLIC BLOOD PRESSURE: 107 MMHG | WEIGHT: 169.98 LBS | RESPIRATION RATE: 17 BRPM | HEART RATE: 96 BPM | DIASTOLIC BLOOD PRESSURE: 69 MMHG | OXYGEN SATURATION: 95 % | HEIGHT: 66 IN

## 2024-03-02 LAB
ALBUMIN SERPL ELPH-MCNC: 3.4 G/DL — SIGNIFICANT CHANGE UP (ref 3.3–5)
ALP SERPL-CCNC: 49 U/L — SIGNIFICANT CHANGE UP (ref 40–120)
ALT FLD-CCNC: 14 U/L — SIGNIFICANT CHANGE UP (ref 12–78)
ANION GAP SERPL CALC-SCNC: 5 MMOL/L — SIGNIFICANT CHANGE UP (ref 5–17)
APTT BLD: 33.6 SEC — SIGNIFICANT CHANGE UP (ref 24.5–35.6)
AST SERPL-CCNC: 16 U/L — SIGNIFICANT CHANGE UP (ref 15–37)
BASOPHILS # BLD AUTO: 0.01 K/UL — SIGNIFICANT CHANGE UP (ref 0–0.2)
BASOPHILS NFR BLD AUTO: 0.2 % — SIGNIFICANT CHANGE UP (ref 0–2)
BILIRUB SERPL-MCNC: 0.9 MG/DL — SIGNIFICANT CHANGE UP (ref 0.2–1.2)
BUN SERPL-MCNC: 9 MG/DL — SIGNIFICANT CHANGE UP (ref 7–23)
CALCIUM SERPL-MCNC: 8.6 MG/DL — SIGNIFICANT CHANGE UP (ref 8.5–10.1)
CHLORIDE SERPL-SCNC: 111 MMOL/L — HIGH (ref 96–108)
CK MB BLD-MCNC: <1.6 % — SIGNIFICANT CHANGE UP (ref 0–3.5)
CK MB CFR SERPL CALC: <1 NG/ML — SIGNIFICANT CHANGE UP (ref 0.5–3.6)
CK SERPL-CCNC: 61 U/L — SIGNIFICANT CHANGE UP (ref 26–192)
CO2 SERPL-SCNC: 27 MMOL/L — SIGNIFICANT CHANGE UP (ref 22–31)
CREAT SERPL-MCNC: 0.6 MG/DL — SIGNIFICANT CHANGE UP (ref 0.5–1.3)
EGFR: 97 ML/MIN/1.73M2 — SIGNIFICANT CHANGE UP
EOSINOPHIL # BLD AUTO: 0.02 K/UL — SIGNIFICANT CHANGE UP (ref 0–0.5)
EOSINOPHIL NFR BLD AUTO: 0.3 % — SIGNIFICANT CHANGE UP (ref 0–6)
GLUCOSE SERPL-MCNC: 97 MG/DL — SIGNIFICANT CHANGE UP (ref 70–99)
HCT VFR BLD CALC: 36.1 % — SIGNIFICANT CHANGE UP (ref 34.5–45)
HGB BLD-MCNC: 12.4 G/DL — SIGNIFICANT CHANGE UP (ref 11.5–15.5)
IMM GRANULOCYTES NFR BLD AUTO: 0.2 % — SIGNIFICANT CHANGE UP (ref 0–0.9)
INR BLD: 1.18 RATIO — SIGNIFICANT CHANGE UP (ref 0.85–1.18)
LYMPHOCYTES # BLD AUTO: 1.43 K/UL — SIGNIFICANT CHANGE UP (ref 1–3.3)
LYMPHOCYTES # BLD AUTO: 22.5 % — SIGNIFICANT CHANGE UP (ref 13–44)
MCHC RBC-ENTMCNC: 30.9 PG — SIGNIFICANT CHANGE UP (ref 27–34)
MCHC RBC-ENTMCNC: 34.3 G/DL — SIGNIFICANT CHANGE UP (ref 32–36)
MCV RBC AUTO: 90 FL — SIGNIFICANT CHANGE UP (ref 80–100)
MONOCYTES # BLD AUTO: 0.42 K/UL — SIGNIFICANT CHANGE UP (ref 0–0.9)
MONOCYTES NFR BLD AUTO: 6.6 % — SIGNIFICANT CHANGE UP (ref 2–14)
NEUTROPHILS # BLD AUTO: 4.47 K/UL — SIGNIFICANT CHANGE UP (ref 1.8–7.4)
NEUTROPHILS NFR BLD AUTO: 70.2 % — SIGNIFICANT CHANGE UP (ref 43–77)
NRBC # BLD: 0 /100 WBCS — SIGNIFICANT CHANGE UP (ref 0–0)
PLATELET # BLD AUTO: 201 K/UL — SIGNIFICANT CHANGE UP (ref 150–400)
POTASSIUM SERPL-MCNC: 3 MMOL/L — LOW (ref 3.5–5.3)
POTASSIUM SERPL-SCNC: 3 MMOL/L — LOW (ref 3.5–5.3)
PROT SERPL-MCNC: 7.4 GM/DL — SIGNIFICANT CHANGE UP (ref 6–8.3)
PROTHROM AB SERPL-ACNC: 14 SEC — HIGH (ref 9.5–13)
RBC # BLD: 4.01 M/UL — SIGNIFICANT CHANGE UP (ref 3.8–5.2)
RBC # FLD: 12.8 % — SIGNIFICANT CHANGE UP (ref 10.3–14.5)
SODIUM SERPL-SCNC: 143 MMOL/L — SIGNIFICANT CHANGE UP (ref 135–145)
TROPONIN I, HIGH SENSITIVITY RESULT: 7.3 NG/L — SIGNIFICANT CHANGE UP
WBC # BLD: 6.36 K/UL — SIGNIFICANT CHANGE UP (ref 3.8–10.5)
WBC # FLD AUTO: 6.36 K/UL — SIGNIFICANT CHANGE UP (ref 3.8–10.5)

## 2024-03-02 PROCEDURE — 93010 ELECTROCARDIOGRAM REPORT: CPT

## 2024-03-02 PROCEDURE — 99223 1ST HOSP IP/OBS HIGH 75: CPT

## 2024-03-02 PROCEDURE — 99285 EMERGENCY DEPT VISIT HI MDM: CPT

## 2024-03-02 RX ORDER — CALCIUM CARBONATE 500(1250)
1 TABLET ORAL
Qty: 0 | Refills: 0 | DISCHARGE

## 2024-03-02 RX ORDER — PROPAFENONE HCL 150 MG
150 TABLET ORAL THREE TIMES A DAY
Refills: 0 | Status: DISCONTINUED | OUTPATIENT
Start: 2024-03-02 | End: 2024-03-03

## 2024-03-02 RX ORDER — FLUTICASONE PROPIONATE 50 MCG
1 SPRAY, SUSPENSION NASAL
Refills: 0 | Status: DISCONTINUED | OUTPATIENT
Start: 2024-03-02 | End: 2024-03-03

## 2024-03-02 RX ORDER — ROFLUMILAST 500 UG/1
1 TABLET ORAL
Qty: 0 | Refills: 0 | DISCHARGE

## 2024-03-02 RX ORDER — DILTIAZEM HCL 120 MG
60 CAPSULE, EXT RELEASE 24 HR ORAL EVERY 6 HOURS
Refills: 0 | Status: DISCONTINUED | OUTPATIENT
Start: 2024-03-02 | End: 2024-03-03

## 2024-03-02 RX ORDER — UMECLIDINIUM 62.5 UG/1
0 AEROSOL, POWDER ORAL
Qty: 0 | Refills: 0 | DISCHARGE

## 2024-03-02 RX ORDER — UMECLIDINIUM 62.5 UG/1
62.5 AEROSOL, POWDER ORAL
Qty: 0 | Refills: 0 | DISCHARGE

## 2024-03-02 RX ORDER — SODIUM CHLORIDE 9 MG/ML
1000 INJECTION INTRAMUSCULAR; INTRAVENOUS; SUBCUTANEOUS
Refills: 0 | Status: DISCONTINUED | OUTPATIENT
Start: 2024-03-02 | End: 2024-03-03

## 2024-03-02 RX ORDER — BUDESONIDE AND FORMOTEROL FUMARATE DIHYDRATE 160; 4.5 UG/1; UG/1
2 AEROSOL RESPIRATORY (INHALATION)
Refills: 0 | Status: DISCONTINUED | OUTPATIENT
Start: 2024-03-02 | End: 2024-03-03

## 2024-03-02 RX ORDER — APIXABAN 2.5 MG/1
5 TABLET, FILM COATED ORAL EVERY 12 HOURS
Refills: 0 | Status: DISCONTINUED | OUTPATIENT
Start: 2024-03-02 | End: 2024-03-03

## 2024-03-02 RX ORDER — LOSARTAN POTASSIUM 100 MG/1
100 TABLET, FILM COATED ORAL DAILY
Refills: 0 | Status: DISCONTINUED | OUTPATIENT
Start: 2024-03-02 | End: 2024-03-02

## 2024-03-02 RX ORDER — POTASSIUM CHLORIDE 20 MEQ
40 PACKET (EA) ORAL ONCE
Refills: 0 | Status: COMPLETED | OUTPATIENT
Start: 2024-03-02 | End: 2024-03-02

## 2024-03-02 RX ORDER — FLUTICASONE FUROATE AND VILANTEROL TRIFENATATE 100; 25 UG/1; UG/1
1 POWDER RESPIRATORY (INHALATION)
Qty: 0 | Refills: 0 | DISCHARGE

## 2024-03-02 RX ORDER — SODIUM CHLORIDE 9 MG/ML
500 INJECTION INTRAMUSCULAR; INTRAVENOUS; SUBCUTANEOUS ONCE
Refills: 0 | Status: COMPLETED | OUTPATIENT
Start: 2024-03-02 | End: 2024-03-02

## 2024-03-02 RX ORDER — APIXABAN 2.5 MG/1
1 TABLET, FILM COATED ORAL
Refills: 0 | DISCHARGE

## 2024-03-02 RX ORDER — POTASSIUM CHLORIDE 20 MEQ
40 PACKET (EA) ORAL EVERY 4 HOURS
Refills: 0 | Status: COMPLETED | OUTPATIENT
Start: 2024-03-02 | End: 2024-03-02

## 2024-03-02 RX ORDER — CHOLECALCIFEROL (VITAMIN D3) 125 MCG
1 CAPSULE ORAL
Qty: 0 | Refills: 0 | DISCHARGE

## 2024-03-02 RX ORDER — ROFLUMILAST 500 UG/1
500 TABLET ORAL DAILY
Refills: 0 | Status: DISCONTINUED | OUTPATIENT
Start: 2024-03-02 | End: 2024-03-03

## 2024-03-02 RX ORDER — ALBUTEROL 90 UG/1
2 AEROSOL, METERED ORAL EVERY 6 HOURS
Refills: 0 | Status: DISCONTINUED | OUTPATIENT
Start: 2024-03-02 | End: 2024-03-03

## 2024-03-02 RX ORDER — SIMVASTATIN 20 MG/1
1 TABLET, FILM COATED ORAL
Qty: 0 | Refills: 0 | DISCHARGE

## 2024-03-02 RX ORDER — TIOTROPIUM BROMIDE 18 UG/1
2 CAPSULE ORAL; RESPIRATORY (INHALATION) DAILY
Refills: 0 | Status: DISCONTINUED | OUTPATIENT
Start: 2024-03-02 | End: 2024-03-03

## 2024-03-02 RX ORDER — BACLOFEN 100 %
1 POWDER (GRAM) MISCELLANEOUS
Qty: 0 | Refills: 0 | DISCHARGE

## 2024-03-02 RX ORDER — ATORVASTATIN CALCIUM 80 MG/1
20 TABLET, FILM COATED ORAL AT BEDTIME
Refills: 0 | Status: DISCONTINUED | OUTPATIENT
Start: 2024-03-02 | End: 2024-03-03

## 2024-03-02 RX ORDER — GABAPENTIN 400 MG/1
100 CAPSULE ORAL THREE TIMES A DAY
Refills: 0 | Status: DISCONTINUED | OUTPATIENT
Start: 2024-03-02 | End: 2024-03-03

## 2024-03-02 RX ORDER — CALCIUM CARBONATE 500(1250)
1 TABLET ORAL DAILY
Refills: 0 | Status: DISCONTINUED | OUTPATIENT
Start: 2024-03-02 | End: 2024-03-02

## 2024-03-02 RX ORDER — SODIUM CHLORIDE 9 MG/ML
1000 INJECTION INTRAMUSCULAR; INTRAVENOUS; SUBCUTANEOUS ONCE
Refills: 0 | Status: COMPLETED | OUTPATIENT
Start: 2024-03-02 | End: 2024-03-02

## 2024-03-02 RX ADMIN — Medication 40 MILLIEQUIVALENT(S): at 13:45

## 2024-03-02 RX ADMIN — SODIUM CHLORIDE 1000 MILLILITER(S): 9 INJECTION INTRAMUSCULAR; INTRAVENOUS; SUBCUTANEOUS at 10:10

## 2024-03-02 RX ADMIN — Medication 60 MILLIGRAM(S): at 17:49

## 2024-03-02 RX ADMIN — GABAPENTIN 100 MILLIGRAM(S): 400 CAPSULE ORAL at 13:45

## 2024-03-02 RX ADMIN — ROFLUMILAST 500 MICROGRAM(S): 500 TABLET ORAL at 18:55

## 2024-03-02 RX ADMIN — SODIUM CHLORIDE 500 MILLILITER(S): 9 INJECTION INTRAMUSCULAR; INTRAVENOUS; SUBCUTANEOUS at 14:18

## 2024-03-02 RX ADMIN — Medication 40 MILLIGRAM(S): at 22:17

## 2024-03-02 RX ADMIN — SODIUM CHLORIDE 125 MILLILITER(S): 9 INJECTION INTRAMUSCULAR; INTRAVENOUS; SUBCUTANEOUS at 13:45

## 2024-03-02 RX ADMIN — Medication 40 MILLIEQUIVALENT(S): at 17:50

## 2024-03-02 RX ADMIN — Medication 40 MILLIEQUIVALENT(S): at 10:10

## 2024-03-02 RX ADMIN — SODIUM CHLORIDE 1000 MILLILITER(S): 9 INJECTION INTRAMUSCULAR; INTRAVENOUS; SUBCUTANEOUS at 11:22

## 2024-03-02 RX ADMIN — ATORVASTATIN CALCIUM 20 MILLIGRAM(S): 80 TABLET, FILM COATED ORAL at 22:18

## 2024-03-02 RX ADMIN — Medication 150 MILLIGRAM(S): at 22:16

## 2024-03-02 RX ADMIN — SODIUM CHLORIDE 125 MILLILITER(S): 9 INJECTION INTRAMUSCULAR; INTRAVENOUS; SUBCUTANEOUS at 13:50

## 2024-03-02 RX ADMIN — BUDESONIDE AND FORMOTEROL FUMARATE DIHYDRATE 2 PUFF(S): 160; 4.5 AEROSOL RESPIRATORY (INHALATION) at 18:22

## 2024-03-02 RX ADMIN — GABAPENTIN 100 MILLIGRAM(S): 400 CAPSULE ORAL at 22:16

## 2024-03-02 RX ADMIN — TIOTROPIUM BROMIDE 2 PUFF(S): 18 CAPSULE ORAL; RESPIRATORY (INHALATION) at 18:23

## 2024-03-02 RX ADMIN — APIXABAN 5 MILLIGRAM(S): 2.5 TABLET, FILM COATED ORAL at 17:50

## 2024-03-02 RX ADMIN — Medication 60 MILLIGRAM(S): at 13:45

## 2024-03-02 RX ADMIN — Medication 150 MILLIGRAM(S): at 17:49

## 2024-03-02 RX ADMIN — Medication 1 SPRAY(S): at 17:50

## 2024-03-02 NOTE — ED PROVIDER NOTE - WET READ LAUNCH FT
ED Time Seen By Provider Entered On:  10/22/2017 7:05     Performed On:  10/22/2017 5:30  by Sammy Frausto DO      Time Seen By Provider   Time Seen by Provider :   10/22/2017 05:30    Sammy Frausto DO - 10/22/2017 7:04            There are no Wet Read(s) to document.

## 2024-03-02 NOTE — ED ADULT NURSE NOTE - NSFALLHARMRISKINTERV_ED_ALL_ED

## 2024-03-02 NOTE — H&P ADULT - NSHPLABSRESULTS_GEN_ALL_CORE
LABS:                        12.4   6.36  )-----------( 201      ( 02 Mar 2024 07:45 )             36.1     03-02    143  |  111<H>  |  9   ----------------------------<  97  3.0<L>   |  27  |  0.60    Ca    8.6      02 Mar 2024 07:45    TPro  7.4  /  Alb  3.4  /  TBili  0.9  /  DBili  x   /  AST  16  /  ALT  14  /  AlkPhos  49  03-02    PT/INR - ( 02 Mar 2024 07:45 )   PT: 14.0 sec;   INR: 1.18 ratio         PTT - ( 02 Mar 2024 07:45 )  PTT:33.6 sec  Urinalysis Basic - ( 02 Mar 2024 07:45 )    Color: x / Appearance: x / SG: x / pH: x  Gluc: 97 mg/dL / Ketone: x  / Bili: x / Urobili: x   Blood: x / Protein: x / Nitrite: x   Leuk Esterase: x / RBC: x / WBC x   Sq Epi: x / Non Sq Epi: x / Bacteria: x          RADIOLOGY & ADDITIONAL TESTS:

## 2024-03-02 NOTE — ED ADULT TRIAGE NOTE - CHIEF COMPLAINT QUOTE
BIBEMS from home. PT c/o palpitation since last night. As per EMS Pt given 19 mg Cardizem and 400 ml NS. Left AC 18G inserted by EMS. Pmhx Afib. HTN, NKDA

## 2024-03-02 NOTE — H&P ADULT - NSICDXPASTSURGICALHX_GEN_ALL_CORE_FT
PAST SURGICAL HISTORY:  H/O cardiac catheterization in 2012 in [facility]- denies intervention

## 2024-03-02 NOTE — ED PROVIDER NOTE - CCCP TRG CHIEF CMPLNT
[Dear  ___] : Dear  [unfilled], [Please see my note below.] : Please see my note below. [Consult Closing:] : Thank you very much for allowing me to participate in the care of this patient.  If you have any questions, please do not hesitate to contact me. [Sincerely,] : Sincerely, [FreeTextEntry3] : Harris Olvera MD\par  Professor of Neurology\par  Director of Neurology Outreach Programs \par  [facility]\par  palpitations

## 2024-03-02 NOTE — PATIENT PROFILE ADULT - IS THERE A SUSPICION OF ABUSE/NEGLIGENCE?
Asthma    CAD (coronary artery disease)    Diabetes    DM2 (diabetes mellitus, type 2)    Essential hypertension    Gastroesophageal reflux disease without esophagitis    HLD (hyperlipidemia)    HTN (hypertension)    Hypothyroid    Hypothyroidism, unspecified type    NSTEMI (non-ST elevated myocardial infarction)    Pure hypercholesterolemia    Uncomplicated asthma, unspecified asthma severity no

## 2024-03-02 NOTE — ED ADULT NURSE NOTE - CHPI ED NUR SYMPTOMS NEG
no chest pain/no chills/no congestion/no diaphoresis/no dizziness/no fever/no nausea/no shortness of breath/no syncope/no vomiting No

## 2024-03-02 NOTE — ED PROVIDER NOTE - CLINICAL SUMMARY MEDICAL DECISION MAKING FREE TEXT BOX
68-year-old female with history of atrial fib on Eliquis, COPD on oxygen as needed, hypertension, chronic sinusitis, hyperlipidemia and sleep apnea presents with palpitations which started last night.  Patient states that her  recently passed away on Monday and feels that that might have been the trigger for her palpitations.  She denies flulike symptoms, dizziness, headache, visual or speech changes, chest pain or shortness of breath.  This morning patient called EMS for persistent symptoms and generalized weakness, patient was given prednisone 10 mg and IV fluids and route.  Patient reports feeling better, on exam patient is awake and alert and oriented x 3, nontoxic/non-lethargic appearing and in no respiratory distress, pertinent findings on exam include a healed shingles rash to patient's left anterior chest wall and left upper mid back, no active fluid-filled lesions chest hyperpigmented areas where the rash was.  Patient does still report having itching and burning sensation, otherwise her exam is benign.  Labs ordered, IV fluids, monitoring, cardizem prn, wilil reassess and dispo

## 2024-03-02 NOTE — ED PROVIDER NOTE - CARE PLAN
Principal Discharge DX:	Atrial fibrillation with RVR   1 Principal Discharge DX:	Atrial fibrillation with RVR  Secondary Diagnosis:	Hypokalemia

## 2024-03-02 NOTE — ED PROVIDER NOTE - NSICDXPASTSURGICALHX_GEN_ALL_CORE_FT
PAST SURGICAL HISTORY:  H/O cardiac catheterization in 2012 in [facility]- denies intervention

## 2024-03-02 NOTE — ED ADULT NURSE NOTE - NSICDXPASTSURGICALHX_GEN_ALL_CORE_FT
PAST SURGICAL HISTORY:  H/O cardiac catheterization in 2012 in [facility]- denies intervention

## 2024-03-02 NOTE — H&P ADULT - NSHPPHYSICALEXAM_GEN_ALL_CORE
PHYSICAL EXAMINATION:  Vital Signs Last 24 Hrs  T(C): 37.1 (02 Mar 2024 11:03), Max: 37.3 (02 Mar 2024 06:50)  T(F): 98.7 (02 Mar 2024 11:03), Max: 99.1 (02 Mar 2024 06:50)  HR: 106 (02 Mar 2024 12:05) (96 - 112)  BP: 103/82 (02 Mar 2024 12:05) (103/82 - 123/69)  BP(mean): --  RR: 22 (02 Mar 2024 12:05) (17 - 22)  SpO2: 96% (02 Mar 2024 12:05) (95% - 96%)    Parameters below as of 02 Mar 2024 12:05  Patient On (Oxygen Delivery Method): room air      CAPILLARY BLOOD GLUCOSE          GENERAL: NAD,   ENMT: mucous membranes moist  NECK: supple, No JVD  CHEST/LUNG: clear to auscultation bilaterally; no rales, rhonchi, or wheezing b/l  HEART: normal S1, S2  ABDOMEN: BS+, soft, ND, NT   EXTREMITIES:  pulses palpable; no clubbing, cyanosis, or edema b/l LEs  NEURO: awake, alert, interactive; moves all extremities PHYSICAL EXAMINATION:  Vital Signs Last 24 Hrs  T(C): 37.1 (02 Mar 2024 11:03), Max: 37.3 (02 Mar 2024 06:50)  T(F): 98.7 (02 Mar 2024 11:03), Max: 99.1 (02 Mar 2024 06:50)  HR: 106 (02 Mar 2024 12:05) (96 - 112)  BP: 103/82 (02 Mar 2024 12:05) (103/82 - 123/69)  BP(mean): --  RR: 22 (02 Mar 2024 12:05) (17 - 22)  SpO2: 96% (02 Mar 2024 12:05) (95% - 96%)    Parameters below as of 02 Mar 2024 12:05  Patient On (Oxygen Delivery Method): room air      CAPILLARY BLOOD GLUCOSE          GENERAL: NAD, seen in ER, comfortable, alert on RA, no wheeze or CP  ENMT: mucous membranes moist  NECK: supple, No JVD  CHEST/LUNG: clear to auscultation bilaterally; no rales, rhonchi, or wheezing b/l  HEART: normal S1, S2  ABDOMEN: BS+, soft, ND, NT   EXTREMITIES:  pulses palpable; no clubbing, cyanosis, or edema b/l LEs  NEURO: awake, alert, interactive; moves all extremities

## 2024-03-02 NOTE — H&P ADULT - ASSESSMENT
68-year-old female PMH of atrial fib on Eliquis, COPD on oxygen as needed, hypertension, chronic sinusitis, hyperlipidemia and sleep apnea presents with palpitations which started last night.  Patient states that her  recently passed away on Monday and feels that that might have been the trigger for her palpitations.  She denies flulike symptoms, dizziness, chest pain or shortness of breath.  This morning patient called EMS for persistent symptoms and generalized weakness, patient was given prednisone 10 mg and IV fluids and route.  Patient reports feeling better, on exam patient is awake and alert and oriented x 3, nontoxic/non-lethargic appearing and in no respiratory distress. Was given IVF and IV Cardizem push in ER for rate control.    Plan:   68-year-old female PMH of atrial fib on Eliquis, COPD on oxygen as needed, hypertension, chronic sinusitis, hyperlipidemia and sleep apnea presents with palpitations which started last night.  Patient states that her  recently passed away on Monday and feels that that might have been the trigger for her palpitations.  She denies flulike symptoms, dizziness, chest pain or shortness of breath.  This morning patient called EMS for persistent symptoms and generalized weakness, patient was given prednisone 10 mg and IV fluids and route.  Patient reports feeling better, on exam patient is awake and alert and oriented x 3, nontoxic/non-lethargic appearing and in no respiratory distress. Was given IVF and IV Cardizem push in ER for rate control.      Plan:  Admit to tele for rapid A.fib and mild dehydration. Will start IVF for now /h. No history of CHF or cardiac stents. Will add Cardizem 60 mg QID     for rate control, continue Eliquis 5 mg bid from home and Propafanone from home 150 mg tid. TSH in AM. Initial trop normal 7.3, K was replaced. No active     infections as no fevers,  WBC normal at 6.3. Glucose normal at 97.       Will continue home meds: Sildenafil 40 mg tid and Roflumilast from home non formulary 500 mcg/day both for pulmonary HTN. Continue NC at 2.0 lpm.     COPD stable, continue Symbicort and Spiriva here.     Will stop Cozaar and Norvasc, BP low and will not control heart rate.       Neurontin added for prior left sided shingles, well healed, still some residual pain.       Consider Cardio consult in AM for rate control if needed.  68-year-old female PMH of atrial fib on Eliquis, COPD on oxygen as needed, hypertension, chronic sinusitis, hyperlipidemia and sleep apnea presents with palpitations which started last night.  Patient states that her  recently passed away on Monday and feels that that might have been the trigger for her palpitations.  She denies flulike symptoms, dizziness, chest pain or shortness of breath.  This morning patient called EMS for persistent symptoms and generalized weakness, patient was given prednisone 10 mg and IV fluids and route.  Patient reports feeling better, on exam patient is awake and alert and oriented x 3, nontoxic/non-lethargic appearing and in no respiratory distress. Was given IVF and IV Cardizem push in ER for rate control.      Plan:  Admit to tele for rapid A.fib and mild dehydration. Will start IVF for now /h. No history of CHF or cardiac stents. Will add Cardizem 60 mg QID     for rate control, continue Eliquis 5 mg bid from home and Propafanone from home 150 mg tid. TSH in AM. Initial trop normal 7.3, K was replaced. No active     infections as no fevers,  WBC normal at 6.3. Glucose normal at 97.       Will continue home meds: Sildenafil 40 mg tid and Roflumilast from home non formulary 500 mcg/day both for pulmonary HTN. Continue NC at 2.0 lpm.     COPD stable, continue Symbicort and Spiriva here.     Will stop Cozaar and Norvasc, BP low and will not control heart rate.       Neurontin added for prior left sided shingles, well healed, still some residual pain.       Consider Cardio consult in AM for rate control if needed.     All home meds were confirmed with daughter at bedside.

## 2024-03-02 NOTE — H&P ADULT - HISTORY OF PRESENT ILLNESS
68-year-old female PMH of atrial fib on Eliquis, COPD on oxygen as needed, hypertension, chronic sinusitis, hyperlipidemia and sleep apnea presents with palpitations which started last night.  Patient states that her  recently passed away on Monday and feels that that might have been the trigger for her palpitations.  She denies flulike symptoms, dizziness, chest pain or shortness of breath.  This morning patient called EMS for persistent symptoms and generalized weakness, patient was given prednisone 10 mg and IV fluids and route.  Patient reports feeling better, on exam patient is awake and alert and oriented x 3, nontoxic/non-lethargic appearing and in no respiratory distress. Was given IVF and IV Cardizem push in ER for rate control.   68-year-old female PMH of chronic atrial fib on Eliquis, COPD on oxygen as needed at home and pulmonary HTN, hypertension, chronic sinusitis, hyperlipidemia and sleep apnea presents with palpitations which started last night.  Patient states that her  recently passed away on Monday and feels that might have been the trigger for her palpitations.  She denies flulike symptoms, dizziness, chest pain or shortness of breath.  This morning patient called EMS for persistent symptoms and generalized weakness, patient was given prednisone 10 mg and IV fluids and route.  Patient reports feeling better after IVF, on exam patient is awake and alert and oriented x 3, nontoxic/non-lethargic appearing and in no respiratory distress.     Was given IVF and IV Cardizem push in ER for rate control.

## 2024-03-02 NOTE — PATIENT PROFILE ADULT - FALL HARM RISK - RISK INTERVENTIONS
Communicate Fall Risk and Risk Factors to all staff, patient, and family/Reinforce activity limits and safety measures with patient and family/Use of alarms - bed, chair and/or voice tab/Visual Cue: Yellow wristband/Bed in lowest position, wheels locked, appropriate side rails in place/Call bell, personal items and telephone in reach/Instruct patient to call for assistance before getting out of bed or chair/Non-slip footwear when patient is out of bed/[place] to call system/Physically safe environment - no spills, clutter or unnecessary equipment/Purposeful Proactive Rounding/Room/bathroom lighting operational, light cord in reach

## 2024-03-02 NOTE — ED ADULT NURSE NOTE - OBJECTIVE STATEMENT
Patient is alert and oriented x4. Came in for palpitations that started last night. No complaints of chest pain at this time. Noted shingles scars on upper back and left breast area. PMH COPD and afib, on eliquis. No shortness of breath or difficulty breathing complained. Afib on the monitor, on continuos monitoring.

## 2024-03-02 NOTE — H&P ADULT - HIV OFFER
Opt out Well appearing, awake, alert, oriented to person, place, time/situation and in no apparent distress. normal...

## 2024-03-02 NOTE — ED PROVIDER NOTE - SKIN, MLM
Skin normal color for race, warm, dry and intact. +healed shingles rash left chest and left upper back

## 2024-03-03 ENCOUNTER — TRANSCRIPTION ENCOUNTER (OUTPATIENT)
Age: 69
End: 2024-03-03

## 2024-03-03 VITALS
DIASTOLIC BLOOD PRESSURE: 69 MMHG | SYSTOLIC BLOOD PRESSURE: 106 MMHG | HEART RATE: 97 BPM | TEMPERATURE: 98 F | OXYGEN SATURATION: 94 % | RESPIRATION RATE: 18 BRPM

## 2024-03-03 LAB
ANION GAP SERPL CALC-SCNC: 2 MMOL/L — LOW (ref 5–17)
BUN SERPL-MCNC: 7 MG/DL — SIGNIFICANT CHANGE UP (ref 7–23)
CALCIUM SERPL-MCNC: 8.8 MG/DL — SIGNIFICANT CHANGE UP (ref 8.5–10.1)
CHLORIDE SERPL-SCNC: 118 MMOL/L — HIGH (ref 96–108)
CO2 SERPL-SCNC: 26 MMOL/L — SIGNIFICANT CHANGE UP (ref 22–31)
CREAT SERPL-MCNC: 0.61 MG/DL — SIGNIFICANT CHANGE UP (ref 0.5–1.3)
EGFR: 97 ML/MIN/1.73M2 — SIGNIFICANT CHANGE UP
GLUCOSE SERPL-MCNC: 97 MG/DL — SIGNIFICANT CHANGE UP (ref 70–99)
HCV AB S/CO SERPL IA: 0.11 S/CO — SIGNIFICANT CHANGE UP (ref 0–0.99)
HCV AB SERPL-IMP: SIGNIFICANT CHANGE UP
POTASSIUM SERPL-MCNC: 4.5 MMOL/L — SIGNIFICANT CHANGE UP (ref 3.5–5.3)
POTASSIUM SERPL-SCNC: 4.5 MMOL/L — SIGNIFICANT CHANGE UP (ref 3.5–5.3)
SODIUM SERPL-SCNC: 146 MMOL/L — HIGH (ref 135–145)
TROPONIN I, HIGH SENSITIVITY RESULT: 5.4 NG/L — SIGNIFICANT CHANGE UP

## 2024-03-03 PROCEDURE — 99239 HOSP IP/OBS DSCHRG MGMT >30: CPT

## 2024-03-03 RX ORDER — GABAPENTIN 400 MG/1
1 CAPSULE ORAL
Qty: 0 | Refills: 0 | DISCHARGE
Start: 2024-03-03

## 2024-03-03 RX ORDER — FLUTICASONE FUROATE AND VILANTEROL TRIFENATATE 100; 25 UG/1; UG/1
1 POWDER RESPIRATORY (INHALATION)
Qty: 0 | Refills: 0 | DISCHARGE

## 2024-03-03 RX ORDER — LOSARTAN POTASSIUM 100 MG/1
1 TABLET, FILM COATED ORAL
Qty: 0 | Refills: 0 | DISCHARGE

## 2024-03-03 RX ORDER — DILTIAZEM HCL 120 MG
1 CAPSULE, EXT RELEASE 24 HR ORAL
Qty: 30 | Refills: 0
Start: 2024-03-03

## 2024-03-03 RX ORDER — DILTIAZEM HCL 120 MG
120 CAPSULE, EXT RELEASE 24 HR ORAL DAILY
Refills: 0 | Status: DISCONTINUED | OUTPATIENT
Start: 2024-03-03 | End: 2024-03-03

## 2024-03-03 RX ORDER — ROFLUMILAST 500 UG/1
1 TABLET ORAL
Qty: 0 | Refills: 0 | DISCHARGE
Start: 2024-03-03

## 2024-03-03 RX ORDER — PROPAFENONE HCL 150 MG
1 TABLET ORAL
Qty: 0 | Refills: 0 | DISCHARGE

## 2024-03-03 RX ADMIN — Medication 150 MILLIGRAM(S): at 06:23

## 2024-03-03 RX ADMIN — Medication 150 MILLIGRAM(S): at 15:04

## 2024-03-03 RX ADMIN — Medication 120 MILLIGRAM(S): at 15:05

## 2024-03-03 RX ADMIN — GABAPENTIN 100 MILLIGRAM(S): 400 CAPSULE ORAL at 15:05

## 2024-03-03 RX ADMIN — Medication 40 MILLIGRAM(S): at 06:23

## 2024-03-03 RX ADMIN — TIOTROPIUM BROMIDE 2 PUFF(S): 18 CAPSULE ORAL; RESPIRATORY (INHALATION) at 12:24

## 2024-03-03 RX ADMIN — Medication 40 MILLIGRAM(S): at 15:05

## 2024-03-03 RX ADMIN — GABAPENTIN 100 MILLIGRAM(S): 400 CAPSULE ORAL at 06:23

## 2024-03-03 RX ADMIN — APIXABAN 5 MILLIGRAM(S): 2.5 TABLET, FILM COATED ORAL at 06:23

## 2024-03-03 RX ADMIN — ROFLUMILAST 500 MICROGRAM(S): 500 TABLET ORAL at 12:23

## 2024-03-03 RX ADMIN — Medication 60 MILLIGRAM(S): at 07:22

## 2024-03-03 RX ADMIN — SODIUM CHLORIDE 125 MILLILITER(S): 9 INJECTION INTRAMUSCULAR; INTRAVENOUS; SUBCUTANEOUS at 08:14

## 2024-03-03 RX ADMIN — SODIUM CHLORIDE 125 MILLILITER(S): 9 INJECTION INTRAMUSCULAR; INTRAVENOUS; SUBCUTANEOUS at 06:23

## 2024-03-03 RX ADMIN — Medication 1 SPRAY(S): at 06:24

## 2024-03-03 NOTE — DISCHARGE NOTE PROVIDER - NSDCCPCAREPLAN_GEN_ALL_CORE_FT
PRINCIPAL DISCHARGE DIAGNOSIS  Diagnosis: Atrial fibrillation with RVR  Assessment and Plan of Treatment:       SECONDARY DISCHARGE DIAGNOSES  Diagnosis: Hypokalemia  Assessment and Plan of Treatment:     Diagnosis: COPD, mild  Assessment and Plan of Treatment:     Diagnosis: GEO on CPAP  Assessment and Plan of Treatment:

## 2024-03-03 NOTE — DISCHARGE NOTE PROVIDER - NSDCMRMEDTOKEN_GEN_ALL_CORE_FT
albuterol 0.63 mg/3 mL (0.021%) inhalation solution: 3 milliliter(s) by nebulizer 3 times a day   baclofen 10 mg oral tablet: 1 tab(s) orally once a day (at bedtime)  Breo Ellipta 200 mcg-25 mcg/inh inhalation powder: 1 puff(s) inhaled once a day  dilTIAZem 120 mg/24 hours oral capsule, extended release: 1 cap(s) orally once a day  Eliquis 5 mg oral tablet: 1 tab(s) orally 2 times a day  Flonase 50 mcg/inh nasal spray: 1 spray(s) intranasally once a day   gabapentin 100 mg oral capsule: 1 cap(s) orally 3 times a day  montelukast 10 mg oral tablet: 1 tab(s) orally once a day (in the evening)  NexIUM 40 mg oral delayed release capsule: 1 cap(s) orally once a day  propafenone 150 mg oral tablet: 1 tab(s) orally every 8 hours  roflumilast 500 mcg oral tablet: 1 tab(s) orally once a day  sildenafil 20 mg oral tablet: 1 tab(s) orally 3 times a day  sildenafil 20 mg oral tablet: 0.5 tab(s) orally 3 times a day  simvastatin 20 mg oral tablet: 1 tab(s) orally once a day (at bedtime)  Vitamin D3 1000 intl units oral tablet: 1 tab(s) orally once a day  Xopenex 0.31 mg/3 mL inhalation solution: 3 milliliter(s) inhaled 3 times a day

## 2024-03-03 NOTE — DISCHARGE NOTE PROVIDER - NSDCFUADDAPPT_GEN_ALL_CORE_FT
APPTS ARE READY TO BE MADE: [X] YES    Best Family or Patient Contact (if needed):    Additional Information about above appointments (if needed):    1:   2:   3:     Other comments or requests:    APPTS ARE READY TO BE MADE: [X] YES    Best Family or Patient Contact (if needed):    Additional Information about above appointments (if needed):    1:   2:   3:     Other comments or requests:     Patient advised they did not want to proceed with scheduling appointments with their established Cardiologist. They will coordinate care on their own.

## 2024-03-03 NOTE — DISCHARGE NOTE PROVIDER - NSDCFUSCHEDAPPT_GEN_ALL_CORE_FT
Sadie De Paz  [facility]  [address]  Scheduled Appointment: 03/15/2024    Larry Epps  [facility] [place] [id number]  Scheduled Appointment: 04/16/2024

## 2024-03-03 NOTE — DISCHARGE NOTE PROVIDER - HOSPITAL COURSE
68-year-old female PMH of atrial fib on Eliquis, COPD on oxygen as needed, hypertension, chronic sinusitis, hyperlipidemia and sleep apnea presents with palpitations which started last night.  Patient states that her  recently passed away on Monday and feels that that might have been the trigger for her palpitations.  She denies flulike symptoms, dizziness, chest pain or shortness of breath.  This morning patient called EMS for persistent symptoms and generalized weakness, patient was given prednisone 10 mg and IV fluids and route.  Patient reports feeling better, on exam patient is awake and alert and oriented x 3, nontoxic/non-lethargic appearing and in no respiratory distress. Was given IVF and IV Cardizem push in ER for rate control.    # Chronic Afib with RVR - now converted to NSR.  Feels well, no s/s of HF.  Reports having close f/u with her Cardiologist, next visit Tuesday (48h).  Will decrease Cardizem to Cardizem CD 120mg daily.  Eliquis, Propafenone.   # COPD stable, continue Symbicort and Spiriva here. Not requiring O2.   # HTN - stop remainder of antihypertensives on discharge, as BP low normal on Cardizem.   # GEO - continue home CPAP.  # Inpatient DVT Proph - on anticoagulation     Offered further cardiac workup including TTE.  Pt feels well, wishes d/c to make arrangements due to recent death of .  Mood appropriate, normal affect.  Outpatient cardiology followup Tuesday.    Disposition: Stable for discharge.  Outpatient followup discussed.  Total time spent on discharge is  60 minutes.

## 2024-03-03 NOTE — DISCHARGE NOTE NURSING/CASE MANAGEMENT/SOCIAL WORK - PATIENT PORTAL LINK FT
You can access the FollowMyHealth Patient Portal offered by [facility] by registering at the following website: [email or web address]. By joining [employer]’s FollowMyHealth portal, you will also be able to view your health information using other applications (apps) compatible with our system.

## 2024-03-03 NOTE — PROGRESS NOTE ADULT - SUBJECTIVE AND OBJECTIVE BOX
Patient: STEPH GARCIA [id number] 68y Female                            Hospitalist Attending Note    No complaints.  No chest pain / palpitations.   Feels well, wishing d/c home.    Tele - NSR HR 80s.   Ambulating freely.     ____________________PHYSICAL EXAM:  GENERAL:  NAD Alert and Oriented x 3   HEENT: NCAT  CARDIOVASCULAR:  S1, S2  LUNGS: CTAB RRR  ABDOMEN:  soft, (-) tenderness, (-) distension, (+) bowel sounds, (-) guarding, (-) rebound (-) rigidity  EXTREMITIES:  no cyanosis / clubbing / edema.   ____________________     VITALS:  Vital Signs Last 24 Hrs  T(C): 37.1 (03 Mar 2024 11:13), Max: 37.2 (02 Mar 2024 16:05)  T(F): 98.8 (03 Mar 2024 11:13), Max: 98.9 (02 Mar 2024 16:05)  HR: 82 (03 Mar 2024 11:13) (82 - 130)  BP: 102/68 (03 Mar 2024 11:13) (97/62 - 118/74)  BP(mean): --  RR: 19 (03 Mar 2024 11:13) (18 - 22)  SpO2: 96% (03 Mar 2024 11:13) (93% - 96%)    Parameters below as of 03 Mar 2024 11:13  Patient On (Oxygen Delivery Method): room air     Daily Height in cm: 167.64 (02 Mar 2024 13:28)    Daily   CAPILLARY BLOOD GLUCOSE        I&O's Summary      HISTORY:  PAST MEDICAL & SURGICAL HISTORY:  Asthma      COPD (chronic obstructive pulmonary disease)      Hypercholesterolemia      Essential hypertension      Chronic sinusitis      GERD (gastroesophageal reflux disease)      Atrial fibrillation      Sleep apnea      H/O cardiac catheterization  in 2012 in [facility]- denies intervention      Allergies    No Known Allergies    Intolerances    oxycodone (Nausea)     LABS:                        12.4   6.36  )-----------( 201      ( 02 Mar 2024 07:45 )             36.1     03-03    146<H>  |  118<H>  |  7   ----------------------------<  97  4.5   |  26  |  0.61    Ca    8.8      03 Mar 2024 06:52    TPro  7.4  /  Alb  3.4  /  TBili  0.9  /  DBili  x   /  AST  16  /  ALT  14  /  AlkPhos  49  03-02    PT/INR - ( 02 Mar 2024 07:45 )   PT: 14.0 sec;   INR: 1.18 ratio         PTT - ( 02 Mar 2024 07:45 )  PTT:33.6 sec  LIVER FUNCTIONS - ( 02 Mar 2024 07:45 )  Alb: 3.4 g/dL / Pro: 7.4 gm/dL / ALK PHOS: 49 U/L / ALT: 14 U/L / AST: 16 U/L / GGT: x           Urinalysis Basic - ( 03 Mar 2024 06:52 )    Color: x / Appearance: x / SG: x / pH: x  Gluc: 97 mg/dL / Ketone: x  / Bili: x / Urobili: x   Blood: x / Protein: x / Nitrite: x   Leuk Esterase: x / RBC: x / WBC x   Sq Epi: x / Non Sq Epi: x / Bacteria: x      CARDIAC MARKERS ( 02 Mar 2024 07:45 )  x     / x     / 61 U/L / x     / <1.0 ng/mL          MEDICATIONS:  MEDICATIONS  (STANDING):  apixaban 5 milliGRAM(s) Oral every 12 hours  atorvastatin 20 milliGRAM(s) Oral at bedtime  budesonide 160 MICROgram(s)/formoterol 4.5 MICROgram(s) Inhaler 2 Puff(s) Inhalation two times a day  diltiazem    milliGRAM(s) Oral daily  fluticasone propionate 50 MICROgram(s)/spray Nasal Spray 1 Spray(s) Both Nostrils two times a day  gabapentin 100 milliGRAM(s) Oral three times a day  propafenone 150 milliGRAM(s) Oral three times a day  roflumilast 500 MICROGram(s) Oral daily  sildenafil (REVATIO) 40 milliGRAM(s) Oral three times a day  sodium chloride 0.9%. 1000 milliLiter(s) (125 mL/Hr) IV Continuous <Continuous>  tiotropium 2.5 MICROgram(s) Inhaler 2 Puff(s) Inhalation daily    MEDICATIONS  (PRN):  albuterol    90 MICROgram(s) HFA Inhaler 2 Puff(s) Inhalation every 6 hours PRN Shortness of Breath and/or Wheezing

## 2024-03-03 NOTE — DISCHARGE NOTE NURSING/CASE MANAGEMENT/SOCIAL WORK - NSDCPEFALRISK_GEN_ALL_CORE
For information on Fall & Injury Prevention, visit: [email or web address] OR  [email or web address] OR  https://www.cdc.gov/steadi/patient.html

## 2024-03-03 NOTE — PROGRESS NOTE ADULT - ASSESSMENT
68-year-old female PMH of atrial fib on Eliquis, COPD on oxygen as needed, hypertension, chronic sinusitis, hyperlipidemia and sleep apnea presents with palpitations which started last night.  Patient states that her  recently passed away on Monday and feels that that might have been the trigger for her palpitations.  She denies flulike symptoms, dizziness, chest pain or shortness of breath.  This morning patient called EMS for persistent symptoms and generalized weakness, patient was given prednisone 10 mg and IV fluids and route.  Patient reports feeling better, on exam patient is awake and alert and oriented x 3, nontoxic/non-lethargic appearing and in no respiratory distress. Was given IVF and IV Cardizem push in ER for rate control.    # Chronic Afib with RVR - now converted to NSR.  Feels well, no s/s of HF.  Reports having close f/u with her Cardiologist, next visit Tuesday (48h).  Will decrease Cardizem to Cardizem CD 120mg daily.  Eliquis, Propafenone.   # COPD stable, continue Symbicort and Spiriva here. Not requiring O2.   # HTN - stop remainder of antihypertensives on discharge, as BP low normal on Cardizem.   # GEO - continue home CPAP.  # Inpatient DVT Proph - on anticoagulation     Offered further cardiac workup including TTE.  Pt feels well, wishes d/c to make arrangements due to recent death of .  Mood appropriate, normal affect.  Outpatient cardiology followup Tuesday.

## 2024-03-03 NOTE — DISCHARGE NOTE PROVIDER - NSDCFUADDINST_GEN_ALL_CORE_FT
It is important to see your primary physician as well as any specialty physicians within the next week to perform a comprehensive medical review.  Call their offices for an appointment as soon as you leave the hospital.  You will also need to see them for renewal of your medications.  If have any difficulty following with a physician, contact the [facility] [phone] or via [email or web address].   To obtain your results, you can access the [employer] Patient Portal at [email or web address].  Your medical issues appear to be stable at this time, but if your symptoms recur or worsen, contact your physicians and/or return to the hospital if necessary.  If you encounter any issues or questions with your medication, call your physicians before stopping the medication.  Do not drive.  Limit your diet to 2 grams of sodium daily.

## 2024-03-07 ENCOUNTER — RX RENEWAL (OUTPATIENT)
Age: 69
End: 2024-03-07

## 2024-03-08 DIAGNOSIS — I48.20 CHRONIC ATRIAL FIBRILLATION, UNSPECIFIED: ICD-10-CM

## 2024-03-08 DIAGNOSIS — I10 ESSENTIAL (PRIMARY) HYPERTENSION: ICD-10-CM

## 2024-03-08 DIAGNOSIS — Z79.01 LONG TERM (CURRENT) USE OF ANTICOAGULANTS: ICD-10-CM

## 2024-03-08 DIAGNOSIS — E78.00 PURE HYPERCHOLESTEROLEMIA, UNSPECIFIED: ICD-10-CM

## 2024-03-08 DIAGNOSIS — K21.9 GASTRO-ESOPHAGEAL REFLUX DISEASE WITHOUT ESOPHAGITIS: ICD-10-CM

## 2024-03-08 DIAGNOSIS — Z63.4 DISAPPEARANCE AND DEATH OF FAMILY MEMBER: ICD-10-CM

## 2024-03-08 DIAGNOSIS — G47.33 OBSTRUCTIVE SLEEP APNEA (ADULT) (PEDIATRIC): ICD-10-CM

## 2024-03-08 DIAGNOSIS — Z99.89 DEPENDENCE ON OTHER ENABLING MACHINES AND DEVICES: ICD-10-CM

## 2024-03-08 DIAGNOSIS — Z87.891 PERSONAL HISTORY OF NICOTINE DEPENDENCE: ICD-10-CM

## 2024-03-08 DIAGNOSIS — E86.0 DEHYDRATION: ICD-10-CM

## 2024-03-08 DIAGNOSIS — J44.9 CHRONIC OBSTRUCTIVE PULMONARY DISEASE, UNSPECIFIED: ICD-10-CM

## 2024-03-08 DIAGNOSIS — J32.9 CHRONIC SINUSITIS, UNSPECIFIED: ICD-10-CM

## 2024-03-08 DIAGNOSIS — E87.6 HYPOKALEMIA: ICD-10-CM

## 2024-03-08 DIAGNOSIS — Z79.51 LONG TERM (CURRENT) USE OF INHALED STEROIDS: ICD-10-CM

## 2024-03-08 DIAGNOSIS — I27.20 PULMONARY HYPERTENSION, UNSPECIFIED: ICD-10-CM

## 2024-03-08 SDOH — SOCIAL STABILITY - SOCIAL INSECURITY: DISSAPEARANCE AND DEATH OF FAMILY MEMBER: Z63.4

## 2024-03-10 LAB — ACID FAST STN SPT: NORMAL

## 2024-03-15 ENCOUNTER — NON-APPOINTMENT (OUTPATIENT)
Age: 69
End: 2024-03-15

## 2024-03-24 ENCOUNTER — RESULT REVIEW (OUTPATIENT)
Age: 69
End: 2024-03-24

## 2024-03-24 ENCOUNTER — APPOINTMENT (OUTPATIENT)
Dept: CT IMAGING | Facility: HOSPITAL | Age: 69
End: 2024-03-24

## 2024-03-24 ENCOUNTER — OUTPATIENT (OUTPATIENT)
Dept: OUTPATIENT SERVICES | Facility: HOSPITAL | Age: 69
LOS: 1 days | End: 2024-03-24
Payer: MEDICARE

## 2024-03-24 PROCEDURE — 71250 CT THORAX DX C-: CPT | Mod: 26,MH

## 2024-03-24 PROCEDURE — 71250 CT THORAX DX C-: CPT | Mod: MH

## 2024-03-25 NOTE — PROCEDURE
[FreeTextEntry1] : 24 CT CHEST 1. Since 2023, there is new consolidation with air bronchograms in the left lower lobe, suspicious for pneumonia. 2. A previously seen 5 mm solid nodule in the right upper lobe has resolved.  3. No significant change 5 mm part solid nodule or opacity with possible 3 mm solid component right upper lobe. Continued follow-up chest CT recommended in 3-6 months. ***** PFT 10/27/23 FVC: 2.51 L (96%)--> 2.85 L (108%) FEV1: 1.10 L (54%)--> 1.18 L (58%) FEV1/FVC: 44%--> 41% OLU06-20%: 0.35 L/s (19%)--> 0.37 L/s (19%) T.92 L (93%) RV/T% DLCO: 6.22 (27%)  6MWT 10/27/23: 293 meters, Spo2 95% -> 85%, given 2 L o2 -> 89%.10/18/23  ***** LABS Pro BNP <36 HIV non reactive Uric acid WNL Rheumatoid factor, C3, C4, CCP, scleroderma, RNP, YOMI WNL ds DNA positive ***** Cardiac cath 20 PA pressures 38/16 with mean 26. Pulmonary capillary occlusion pressure 14.0 Cardiac output 5.8. Diastolic gradient 2.0. Transpulmonary pressure gradient 12.0 (both normal). PVR (12/5.8) 2.0.  EXAM: ECHOCARDIOGRAM (CARDIOL) 2018 Normal left ventricular size and wall thickness.The left ventricular wall motion is normal.The left ventricular ejection fraction is normal.The left ventricular ejection fraction is 60%.The right ventricle is normal in size and function.The left atrial size is normal.Right atrial size is normal.Structurally normal aortic valve.No aortic regurgitation noted.Mildly calcified anterior leaflet of the mitral valve.There is mild to moderate mitral regurgitation.Structurally normal tricuspid valve.There is trace to mild tricuspid regurgitation.There is mild pulmonary hypertension.The pulmonary artery systolic pressure is estimated to be 40 mmHg.Structurally normal pulmonic valve.There is trace pulmonic regurgitation.There is no pericardial effusion.There is no significant change when compared to the echo from 2017.  RH 10/13/15: -Pulmonary capillary occlusion pressure: 17 mm of Hg -Pulmonary artery systolic: 40 mmHg -Pulmonary artery diastolic: 20mm Hg -Mean pulmonary artery pressure: 29 mm Hg -Right atrial pressure: 12mm Hg -Cardiac output was 4.43 Based upon these numbers, the pulmonary artery diastolic and the wedge gradient is normal at 3. The trans-pulmonary pressure gradient is mildly elevated at 12. With a cardiac output of 4.43 L per minute, the pulmonary vascular resistance is normal at slightly less than 3 Woods units. There is no increase in intrinsic pulmonary vascular resistance. The pulmonary artery pressures are also not elevated out of proportion to the left-sided pressures therefore the patient is not a candidate for advanced vasodilator therapy.

## 2024-03-25 NOTE — HISTORY OF PRESENT ILLNESS
[TextBox_4] : 69 yo F w/ hx of asthma/COPD, AF on propafenone and pradaxa,  following with Dr. Epps referred for PH. Initial RHC in 2015 with mild PH (PA 40/20/29, PAWP 17, CO 4.43, PVR 2.7WU - pre and post cap PH). Due to decreasing exercise tolerance she was placed on sildenafil 20mg TID in Feb 2016. She briefly trialed Adcirca but developed severe headaches, resumed sildenafil. She had a repeat RHC 2020 with PA 38/16/26, PAWP 14, CO 5.8, PVR 2WU. Over the years she has continued to demonstrate mod to severe obstruction (FEV1 48-54%) with severely reduced DLCO (now decreased to 30%), CT chest with substantial paraseptal and severe, confluent, advanced destructive emphysema w/ stable RUL 7mm nodule. She has been maintained on daliresp, breo, incruse, minimal exacerbations, but continued decline in 6MWT, now referred to lung [facility] at [facility] and deemed to be too early given good functional status and no hypoxia at rest. In 2021 sildenafil was increased to 40mg TID.   Of note, had recent ED visit 9/21/23 for palpitations, found to be in rapid AF that resolved with IVF. Last ECG 2/28/20 with sinus tach though she has no AF.  She feels pretty well. Wearing a heart monitor for 1 month to assess the status of her Afib after her recent hospitalization.  Amlodipine 5 mg added by her cardiologist for elevated BP  Exercise tolerance is limited. can clean house a little. Can't do steps for many years. OK at rest. once she exerts herself she is SOB and her SpO2 drops. Only uses O2 outside the home when she is not feeling well. Slow decline. Sildenafil has helped a little. No dizziness of hypotension. No cough except if she has allergies. Occasionally chest tightness from asthma. only uses rescue once a month. No nighttime symptoms. Weight has been stable. Does not like using CPAP. Thinking about Inspire.   PH Risk factors: IVDU or substance use including alcohol :(-) Anorexigens :(-) Amphetamines :(-) Rheumatological dx :(-) Congenital heart dx :(-) Liver dx :(-) Htn,Afib, diastolic dysfunction, large LA, valvular dx :(+AF and +HTN) Metabolic syndrome including obesity :(+ obesity) Parenchymal lung dx :(asthma and COPD) GEO :(+ GEO ,uses CPAP) Hx of DVT or PE: (-) Hx of splenectomy :(-) Kidney dx :(-) Hematological/Malignancy/Anemia :(-) HIV:(-)  3-29-24

## 2024-03-25 NOTE — ASSESSMENT
[FreeTextEntry1] : 69 yo F w/ hx of Asthma/COPD, AF, mild PH which was found to be pre and post capillary in 2015 but given progression of symptoms she was placed on sildenafil, referred to PH clinic for persistent MARIE  #PH - Likely WHO Group II/III. Prior RHC's with mild PH, has mod to severe obstruction on PFTs and substantial paraseptal and severe confluent destructive emphysema, not a candidate for valve placement, and NYHA FC II (unable to do stairs), mildly elevated PAWP. She has exertional hypoxia to 77% on 6MWT (3/2022) requiring 3L NC, only walked 192 meters. However she has no hypoxia at rest. Her symptoms have been chronic with possible slow progression over the last 10 yrs, denies frequent exacerbations or chronic cough. On roflumilast and Breo/Incruse. No risk factors for WHO Group I PAH, negative CTD markers 3 yrs ago, no clinical manifestations but will resend panel today.  Given severity of lung disease would not be unexpected to have mild PH, data insufficient to recommend vasodilator therapy in these patients although patients with "vascular phenotype" (mod to severe PH out of proportion to lung disease) may benefit from sildenafil. Ongoing trials are attempting to assess Tyvaso in use of PH 2/2 COPD, currently not approved.  In setting of chronic symptoms with minimal progression over the years there is low likelihood this is progressive PH; however, would obtain NTproBNP and repeat TTE. If she has PH or abnormal RV on TTE would warrant repeat RHC to reassess severity of PH as well as CTA to r/o component of CTEPD. If BNP and TTE are relatively normal we would recommend continue current therapy. Also agree with close monitoring of AF which is poorly tolerant and contributes to frequent symptoms in lung disease. Holter monitor in place.  In Summary - F/U holter monitor and AF burden, consider EP consultation - F/U NTproBNP and TTE - if abnormal will pursue RHC and CTA. - CTD panel  F/U after above studies.

## 2024-03-29 ENCOUNTER — APPOINTMENT (OUTPATIENT)
Dept: PULMONOLOGY | Facility: CLINIC | Age: 69
End: 2024-03-29

## 2024-03-29 ENCOUNTER — NON-APPOINTMENT (OUTPATIENT)
Age: 69
End: 2024-03-29

## 2024-04-08 RX ORDER — BUDESONIDE AND FORMOTEROL FUMARATE DIHYDRATE 160; 4.5 UG/1; UG/1
160-4.5 AEROSOL RESPIRATORY (INHALATION) TWICE DAILY
Qty: 1 | Refills: 5 | Status: ACTIVE | COMMUNITY
Start: 2024-03-05 | End: 1900-01-01

## 2024-04-09 ENCOUNTER — NON-APPOINTMENT (OUTPATIENT)
Age: 69
End: 2024-04-09

## 2024-04-10 ENCOUNTER — NON-APPOINTMENT (OUTPATIENT)
Age: 69
End: 2024-04-10

## 2024-04-10 PROBLEM — A31.0 MAI (MYCOBACTERIUM AVIUM-INTRACELLULARE): Status: ACTIVE | Noted: 2021-10-28

## 2024-04-11 ENCOUNTER — RX CHANGE (OUTPATIENT)
Age: 69
End: 2024-04-11

## 2024-04-16 ENCOUNTER — APPOINTMENT (OUTPATIENT)
Dept: PULMONOLOGY | Facility: CLINIC | Age: 69
End: 2024-04-16
Payer: MEDICARE

## 2024-04-16 VITALS
HEART RATE: 85 BPM | DIASTOLIC BLOOD PRESSURE: 83 MMHG | HEIGHT: 66 IN | RESPIRATION RATE: 16 BRPM | BODY MASS INDEX: 26.36 KG/M2 | WEIGHT: 164 LBS | OXYGEN SATURATION: 88 % | SYSTOLIC BLOOD PRESSURE: 146 MMHG | TEMPERATURE: 97.52 F

## 2024-04-16 DIAGNOSIS — I27.20 PULMONARY HYPERTENSION, UNSPECIFIED: ICD-10-CM

## 2024-04-16 DIAGNOSIS — R91.1 SOLITARY PULMONARY NODULE: ICD-10-CM

## 2024-04-16 DIAGNOSIS — J44.9 CHRONIC OBSTRUCTIVE PULMONARY DISEASE, UNSPECIFIED: ICD-10-CM

## 2024-04-16 DIAGNOSIS — A31.0 PULMONARY MYCOBACTERIAL INFECTION: ICD-10-CM

## 2024-04-16 DIAGNOSIS — R06.09 OTHER FORMS OF DYSPNEA: ICD-10-CM

## 2024-04-16 DIAGNOSIS — G47.33 OBSTRUCTIVE SLEEP APNEA (ADULT) (PEDIATRIC): ICD-10-CM

## 2024-04-16 PROCEDURE — 99215 OFFICE O/P EST HI 40 MIN: CPT

## 2024-04-16 PROCEDURE — G2211 COMPLEX E/M VISIT ADD ON: CPT

## 2024-04-16 NOTE — REVIEW OF SYSTEMS
[Cough] : cough [Dyspnea] : dyspnea [SOB on Exertion] : sob on exertion [Hay Fever] : hay fever [Watery Eyes] : watery eyes [Itchy Eyes] : itchy eyes [Nasal Discharge] : nasal discharge [Arthralgias] : arthralgias [Back Pain] : back pain [Negative] : Endocrine [Fever] : no fever [Chest Tightness] : no chest tightness [Wheezing] : no wheezing [Chest Discomfort] : no chest discomfort [Edema] : no edema [Palpitations] : no palpitations [TextBox_94] : herniated disc

## 2024-04-16 NOTE — DISCUSSION/SUMMARY
[FreeTextEntry1] : ATTENDING'S SUMMARY 4-16-24: no pallor, no icterus no JVD, no hepatojugular reflux, no HSM no cervical adenopathy, no supraclavicular adenopathy normal s1/s2, no murmurs, rubs or gallops Good air entry bilaterally, no wheezing, rhonchi. Few crackles noted at the right base posteriorly no cyanosis, no clubbing, no articular manifestations, no thickening of the skin no pedal edema

## 2024-04-16 NOTE — HISTORY OF PRESENT ILLNESS
[TextBox_4] : 69 yo AAF with Asthma/COPD presents for follow-up. Pt with diffuse centrilobular emphysema on triple therapy and daliresp, pulmonary HTN with PAS 43mmHg on RHC on sildenafil, stable pulmonary nodules, and severe exercise restriction on home o2.   4-16-24:   1-23-24: She has not had exacerbations of COPD since starting Daliresp  9-14-23: Her oxygen level at another physician's office was 88%. She was sitting and resting Her ribs were bothering her. She had a CT   1-24-23: Has a cough and congestion that started 4 days ago. She went to [facility] urgent care and tested negative for COVID-19. She was prescribed a course of prednisone and says that her symptoms are improving overall but not fast enough. She sometimes finds it hard to breath. Her O2 sat this morning on her pulse oximeter was 95%. It usually ranges from 94 - 95%. She is scheduled for CT appointment.   7-21-22: Has been doing well overall and breathing is stable. Using all her medications and has only needed to use the xopenex rarely (last use 2 days ago but proir to that had not used in a while.) her exercise capacity is stable--continues to have difficulty with stairs. Using home O2 on exertion prn. Using her CPAP nightly. Only recently allergies have been starting to bother her--sx of cough, postnasal drip, congestion with the change of season and heat. Went to urgent care a few days ago and was prescribed "allergy pill" and 5 day course of steroids that is about to complete.  Using budesonide BID. No recent hospitalizations. Denies wheezing or shortness of breath, dizziness, swelling in legs.  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  Continues on Breo 200-25, Dailiresp and Incruse. She using Xopenex less often. She is needing the Xopenex 3 times a day for chest tightness and shortness of breath. Normally she uses it like once a day. Feels her asthma is acting up lately with weather changes.  Continues on sildenafil (40, 20, 40 mg). Tolerating medication well.  Had echo with Dr. Vasquez and cardiac cath on 8/24/20.  On CPAP for GEO & using throughout the night. No fevers or chills.  Denies chest pain or palpitations.  She has herniated disc in her back

## 2024-04-16 NOTE — PROCEDURE
[FreeTextEntry1] : Chest CT 3/24/24 IMPRESSION: 1. Interval resolution of a left lower lobe pneumonia within the posterior basal segment of the left lower lobe with a newly developing area of consolidation involving the left anteromedial basal segment. Short interval surveillance imaging to confirm resolution. 2. Stable groundglass nodule within the apical segment of the right upper lobe unchanged from 2019. 3. Severe centrilobular emphysema. 4. Mild cylindrical bronchiectasis.  PFT 10/27/23 FVC: 2.51 L (96%)--> 2.85 L (108%) FEV1: 1.10 L (54%)--> 1.18 L (58%) FEV1/FVC: 44%--> 41% RTA50-40%: 0.35 L/s (19%)--> 0.37 L/s (19%) T.92 L (93%) RV/T% DLCO: 6.22 (27%)  6MWT 10/27/23: 293 meters, Spo2 95% -> 85%, given 2 L o2 -> 89%.   Chest CT 23 IMPRESSION: A 5 mm solid nodule within right apex (series 3 image 52) new from prior exam. Recommend follow-up chest CT in 6 months to determine stability. Stable previously measured 7 mm indistinct subsolid opacity within right apex (series 3 image 57).  Chest CT 3/18/23 IMPRESSION: 1. 7 mm round glass opacity within the apical segment of the right upper lobe (5:96) slightly more conspicuous in size and opacity when compared to 2019 when it measured 5 mm. Continued short interval surveillance is suggested in this high risk individual, in 3-6 months to confirm stability. 2. Additional stable solid micronodules. 3. Stable substantial paraseptal as well as severe, confluent and advanced destructive emphysema again noted.  PFT 3/31/22 FVC: 2.62 L (97%) FEV1: 1.05 L (50%) FEV1/FVC: 40% FEF 25-75%: 0.30 L/s (16%) T.69 L (89%) RV/T% DLCO: 6.95 (30%)  6MWT <date> Patient walked meters during a 6 minute walk test. Resting O2 saturation was % on RA.  Heart rate bpm. End test O2 saturation was % on RA.  Heart rate bpm.  Ivis scale end of test  This signifies    PFT 20 FVC: 2.58 L (94%)--> 2.49 L (91%)   FEV1: 1.06 L (50%)--> 1.06 L (50%)   FEV1/FVC: 41%--> 42% YZC42-01%: 0.31 L/s (16%)--> 0.32 L/s (16%) T.83 L (92%) RV/T% DLCO: 6.9 (32%)  6MWT 20 Patient walked 299 meters during a 6 minute walk test. Resting O2 saturation was 95% on RA.  Heart rate 76 bpm. Patient dropped to 85% on room air at 2 minutes. Patient given 2L 02 via nasal cannula. End test O2 saturation was 89% on 2 liters O2.  Heart rate 102 bpm.  Ivis scale end of test : 0 This signifies reduced walk distance and significant desaturation  Ultrasound 20 - B-lines observed curtain sign noted. No evidence of pleural effusion. Diaphragmatic movement normal.   Cardiac cath 20 PA pressures 38/16 with mean 26. Pulmonary capillary occlusion pressure 14.0 Cardiac output 5.8. Diastolic gradient 2.0. Transpulmonary pressure gradient 12.0 (both normal). PVR (12/5.8) 2.0.  [place], DLCO 19: FVC: 2.71 L (85%) FEV1: 1.26 L (50%) FEV1/FVC: 47% NSU47-21%: 0.45 L/s (17%) DLCO: 8.7 (38%) 6MWT: She declined 6MWT today. Severe obstructive lung defect. Severely decreased diffusion capacity.   EXAM: CT CHEST  PROCEDURE DATE: 2019  COMPARISON: CT chest 2017.  Lungs and large airways: Again seen is diffuse centrilobular and paraseptal emphysema.  There has been a decrease in size of previously seen 0.4 mm left upper lobe nodule, now measuring 3 mm (series 4 image 96).  There is a 4 mm nodule in the superior portion of the left lower lobe (series 4 image 186).  Pleura: No pleural effusion.  Mediastinum and hilar regions: No thoracic lymphadenopathy.  Heart and pericardium: Heart size is normal. No pericardial effusion. Severe coronary artery disease.  Vessels: Mild calcified plaque aorta. Again seen is enlargement of the main pulmonary artery measuring 3.8 cm.  Chest wall and lower neck: Normal.  Upper abdomen: Status post cholecystectomy. Subcentimeter hypodensity in right lobe of liver too small to characterize although likely representing small cyst.  Bones: Mild degenerative changes.  IMPRESSION:  1. Diffuse emphysematous changes.  2. One or two micronodules and nodules (measuring <6mm), stable from 2017. Given risk factors 12 months follow-up low dose CT screening is recommended.  3. No change in dilation of the main pulmonary artery which could be a sequela pulmonary hypertension.   [place], DLCO, 6MWT 18: FVC: 2.61 L (93%) FEV1: 1.22 L (56%) FEV1/FVC: 47% DLE37-26%: 0.39 L/s (19%) DLCO: 7.5 (32%) 6MWT: 240 meters, SpO2 start; 95% --> spO2 end: 85% Moderately severe obstructive defect. Severely reduced diffusion capacity.  6MWT was aborted at 4 minutes due to desaturation; pt also notes walk distance was limited secondary to knee pain. Reduced walk distance with significant desaturation.   EXAM:  ECHOCARDIOGRAM (CARDIOL) 2018   Normal left ventricular size and wall thickness.The left ventricular wall  motion is normal.The left ventricular ejection fraction is normal.The left ventricular ejection fraction is 60%.The right ventricle is normal in size and function.The left atrial size is normal.Right atrial size is normal.Structurally normal aortic valve.No aortic regurgitation noted.Mildly calcified anterior leaflet of the mitral valve.There is mild to moderate mitral regurgitation.Structurally normal tricuspid valve.There is trace to mild tricuspid regurgitation.There is mild pulmonary hypertension.The pulmonary artery systolic pressure is estimated to be 40 mmHg.Structurally normal pulmonic valve.There is trace pulmonic regurgitation.There is no  pericardial effusion.There is no significant change when compared to the echo from 2017.  Spirometry FVC: 2.83 100% FEV1: 1.19 54% FEV1/FVC: 42% DLCO: 31%  AAT: WNL (117)  Echo: 17: EF 55-60%, mild PHTN with PASP 40mmHg  Spirometry and DLCO 17: FVC: 2.71 L (95% pred) --> 2.58 L (90% pred) FEV1: 1.20 L (54% pred) --> 1.14 L (51% pred) FEV1/FVC: 44% --> 44% NPK27-65%: 0.30 L/s (14% pred) --> 0.35 L/s (16% pred) DLCO: 8.0 (32% pred) PI max 17: 54bbQ3D (55% pred) 6MWT: 382m, SpO2 start: 95% --> SpO2 end: 88% Severe obstruction. Severely reduced diffusion capacity. Vastly improved walk distance (about 300m more than last) and improvement in degree of desaturation (but still with significant desaturation).   PFT 3/28/17: FVC: 2.39 L (83% pred) --> 2.22 L (77% pred) FEV1: 1.14 L (50% pred) --> 1.09 L (48% pred) FEV1/FVC: 48% --> 49% RDL26-13%: 0.42 L/s (19% pred) --> 0.40 L/s (18% pred) T.84 L (92% pred) RV/T% DLCO: 8.1 (33% pred) 6MWT 3/28/17: Pt declined due to pain from heel spur Severe obstructive defect. Normal lung volumes. Very severely reduced DLCO.  Air trapping.   Abhinav with bronchodilator 16: FVC: 2.34 L (83% pred) --> 2.67 L (94% pred) FEV1: 1.07 L (48% pred) --> 1.24 L (56% pred) FEV1/FVC: 46% --> 46% ZDW50-99%: 0.38 L/s (18% pred) --> 0.41 L/s (19% pred) 330cc increase in FVC post-bronchodilator. Moderate obstruction.   CXR 16: pt somewhat rotated, haziness in the LLL which is also seen on the lateral film, cannot rule out pneumonia of the LLL  Spirometry 16: FVC: 2.71 L (94% pred) FEv1: 1.23 L (55% pred) FEV1/FVC: 46% VMU65-24%: 0.42 L/s (19% pred) DLCO 16: 9.3 (33% pred <-- 29%) 6MWT 16: Walk time: 2:00, 86 meters, SpO2 start: 89% --> SpO2 end: 82% Improvement in FEV1 and FVC as compared to prior test.  Severe obstructive lung disease. Severely reduced diffusion capacity. Reduced walk distance but test aborted early due to desaturation.  Degree of desaturation improved since previous test.   PFTs 16 FVC: 1.71 (59%) FEV1: 0.98 (44%) FEV1/FVC 58% FEF25/75: 0.59 (27%) T.38 (83%) DLCO: 7.3 (29%) Severe obstructive lung disease with severe reduced DLCO. No significant bronchodilator response  PFTs 16 FVC: 2.57 89% FEV1: 1.19 52% FEV1/FVC: 46% DLCO 37% 6MWT: 86 meters, Baseline: 88% End: 74%  PFTs 12/17/15 FVC: 2.73 (94% of pred) FEV1: 1.31 l (57% of pred) FEV1/FVC: 48% NCF18-28%: 0.36 L/s (59% of pred) % DLCO 43% Pt declining 6MWT today   Spirometry: FVC: 2.46 L (86% of pred) FEV1: 1.26 l (56% of pred) FEV1/FVC: 51.2% IEL82-79%: 0.64 L/s (29% of pred) C/w moderate obstructive pulmonary disease.  RHC 10/13/15: -Pulmonary capillary occlusion pressure: 17 mm of Hg -Pulmonary artery systolic: 40 mmHg -Pulmonary artery diastolic: 20mm Hg -Mean pulmonary artery pressure: 29 mm Hg -Right atrial pressure: 12mm Hg -Cardiac output was 4.43 Based upon these numbers, the pulmonary artery diastolic and the wedge gradient is normal at 3. The trans-pulmonary pressure gradient is mildly elevated at 12. With a cardiac output of 4.43 L per minute, the pulmonary vascular resistance is normal at slightly less than 3 Woods units.  There is no increase in intrinsic pulmonary vascular resistance. The pulmonary artery pressures are also not elevated out of proportion to the left-sided pressures therefore the patient is not a candidate for advanced vasodilator therapy.  TTE 12/19/15: LVH, nomal LVEF PA sys press 34mmHg, mild MR & TR  CT chest CTAPE 08/10/15 IMPRESSION: No evidence for pulmonary emboli. No significant change in moderate emphysematous changes. No significant change in main pulmonary arterial dilatation, possibly reflecting sequelae of pulmonary arterial hypertension.

## 2024-04-16 NOTE — ADDENDUM
[FreeTextEntry1] :  I, Sharon Burdick, am scribing for and in the presence of Dr. Larry Epps the following sections HISTORY OF PRESENT ILLNESSS, PAST MEDICAL/FAMILY/SOCIAL HISTORY; REVIEW OF SYSTEMS; VITAL SIGNS; PHYSICAL EXAM; DISPOSITION  I, Dr. Larry Epps, personally performed the evaluation and management (E/M) services for this established patient who presents today with (a) new problem(s)/exacerbation of (an) existing condition(s). That E/M includes conducting the clinically appropriate interval history &/or exam, assessing all new/exacerbated conditions, and establishing a new plan of care. Today, my FLACO, Ms. Sharon Burdick was here to observe my evaluation and management service for this new problem/exacerbated condition and follow the plan of care established by me going forward. For this patient, Sharon acted as my scribe and wrote the note that i dictated to her.

## 2024-04-16 NOTE — PHYSICAL EXAM
[No Acute Distress] : no acute distress [Normal Oropharynx] : normal oropharynx [Normal Appearance] : normal appearance [No Neck Mass] : no neck mass [Normal Rate/Rhythm] : normal rate/rhythm [Normal S1, S2] : normal s1, s2 [No Murmurs] : no murmurs [No Resp Distress] : no resp distress [No Abnormalities] : no abnormalities [Benign] : benign [Normal Gait] : normal gait [No Clubbing] : no clubbing [No Cyanosis] : no cyanosis [No Edema] : no edema [FROM] : FROM [Normal Color/ Pigmentation] : normal color/ pigmentation [No Focal Deficits] : no focal deficits [Oriented x3] : oriented x3 [Normal Affect] : normal affect [TextBox_68] : Good air entry bilaterally, no wheezing, rhonchi. Few crackles noted at the right base posteriorly [TextBox_125] : Dermatomal involvement of the skin on the left hand side, suggestive of healed herpes zoster lesions

## 2024-04-16 NOTE — ASSESSMENT
[FreeTextEntry1] : 4-16-24:  It was a pleasure to see Marce in follow-up today. Her respiratory issues are summarized:  1. COPD Gold stage 3 mMRC 3, CAT 17 She has a history of frequent exacerbations and till such time as she was put on daliresp, was having frequent exacerbations requiring antibiotics and visit to the ER. With the daliresp, her exacerbations became significantly less. For 2 years, she didn't have any COPD exacerbations. We will keep her on Symbicort and Incruse. We will restart Daliresp  2. Left lower lobe opacity There was a small, dense left lower lobe opacity in the basal segment of the left lower lobe. Over a period of 2 months, the opacity has migrated anteriorly. I suspect that she has either aspiration pneumonia or organizing pneumonia. We will repeat a CT of the chest in 2 months. My index of suspicion for this being cancer is low because she has migrating opacities.  3. Lung Transplant I have had a conversation with Dr. Ernesto Sin. He will evaluate the patient at [facility] for lung transplant candidate  4. 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 This does not require treatment. In contrast to mycobacterium abscesses, this is not a contraindication to lung transplant.    5. Autoimmune disease In the past she had a ds-DNA ab that was elevated to 83. All the other tests have been negative. Today we will repeat these tests to see if there is any evidence of lupus.  6. Pulmonary hypertension Marce likely falls in the 10% of patients with severe COPD who develop pulmonary hypertension. She is currently on Sildenafil 40mg TID. Tolerating medication well without sx. She has been requested to set an appointment with Dr. Sadie De Paz.  7. GEO Marce has GEO and is using CPAP nightly.   8. Pulmonary nodules No change in size of 3mm right upper lobe nodule. Follow up CT will be performed.   9. Health maintenance Marce was administered Prevnar (13 valent) in May 2019. Pneumovax was administered on 8/5/21. Prevnar 20 administered on prior visit.  Return to clinic: 6 months (PFT, 6MWT prior).

## 2024-04-17 LAB
C3 SERPL-MCNC: 122 MG/DL
C4 SERPL-MCNC: 18 MG/DL
DSDNA AB SER-ACNC: <1 IU/ML
ENA RNP AB SER IA-ACNC: <0.2 AL
ENA SM AB SER IA-ACNC: <0.2 AL
RHEUMATOID FACT SER QL: <10 IU/ML

## 2024-04-18 LAB — ANA SER IF-ACNC: NEGATIVE

## 2024-04-19 ENCOUNTER — NON-APPOINTMENT (OUTPATIENT)
Age: 69
End: 2024-04-19

## 2024-04-29 ENCOUNTER — NON-APPOINTMENT (OUTPATIENT)
Age: 69
End: 2024-04-29

## 2024-05-10 ENCOUNTER — NON-APPOINTMENT (OUTPATIENT)
Age: 69
End: 2024-05-10

## 2024-05-15 ENCOUNTER — EMERGENCY (EMERGENCY)
Facility: HOSPITAL | Age: 69
LOS: 0 days | Discharge: HOME | End: 2024-05-15
Attending: EMERGENCY MEDICINE
Payer: MEDICARE

## 2024-05-15 VITALS
OXYGEN SATURATION: 96 % | RESPIRATION RATE: 17 BRPM | HEART RATE: 80 BPM | TEMPERATURE: 99 F | SYSTOLIC BLOOD PRESSURE: 138 MMHG | DIASTOLIC BLOOD PRESSURE: 84 MMHG

## 2024-05-15 VITALS
RESPIRATION RATE: 20 BRPM | WEIGHT: 160.06 LBS | DIASTOLIC BLOOD PRESSURE: 95 MMHG | OXYGEN SATURATION: 90 % | TEMPERATURE: 99 F | HEIGHT: 66 IN | HEART RATE: 111 BPM | SYSTOLIC BLOOD PRESSURE: 155 MMHG

## 2024-05-15 DIAGNOSIS — J45.909 UNSPECIFIED ASTHMA, UNCOMPLICATED: ICD-10-CM

## 2024-05-15 DIAGNOSIS — R35.0 FREQUENCY OF MICTURITION: ICD-10-CM

## 2024-05-15 DIAGNOSIS — Z88.5 ALLERGY STATUS TO NARCOTIC AGENT: ICD-10-CM

## 2024-05-15 DIAGNOSIS — Z20.822 CONTACT WITH AND (SUSPECTED) EXPOSURE TO COVID-19: ICD-10-CM

## 2024-05-15 DIAGNOSIS — I48.91 UNSPECIFIED ATRIAL FIBRILLATION: ICD-10-CM

## 2024-05-15 DIAGNOSIS — Z83.3 FAMILY HISTORY OF DIABETES MELLITUS: ICD-10-CM

## 2024-05-15 DIAGNOSIS — N39.0 URINARY TRACT INFECTION, SITE NOT SPECIFIED: ICD-10-CM

## 2024-05-15 DIAGNOSIS — Z87.891 PERSONAL HISTORY OF NICOTINE DEPENDENCE: ICD-10-CM

## 2024-05-15 DIAGNOSIS — E78.00 PURE HYPERCHOLESTEROLEMIA, UNSPECIFIED: ICD-10-CM

## 2024-05-15 DIAGNOSIS — Z79.01 LONG TERM (CURRENT) USE OF ANTICOAGULANTS: ICD-10-CM

## 2024-05-15 DIAGNOSIS — R53.1 WEAKNESS: ICD-10-CM

## 2024-05-15 DIAGNOSIS — J44.9 CHRONIC OBSTRUCTIVE PULMONARY DISEASE, UNSPECIFIED: ICD-10-CM

## 2024-05-15 DIAGNOSIS — I10 ESSENTIAL (PRIMARY) HYPERTENSION: ICD-10-CM

## 2024-05-15 LAB
ALBUMIN SERPL ELPH-MCNC: 3.5 G/DL — SIGNIFICANT CHANGE UP (ref 3.3–5)
ALP SERPL-CCNC: 49 U/L — SIGNIFICANT CHANGE UP (ref 40–120)
ALT FLD-CCNC: 17 U/L — SIGNIFICANT CHANGE UP (ref 12–78)
ANION GAP SERPL CALC-SCNC: 7 MMOL/L — SIGNIFICANT CHANGE UP (ref 5–17)
APPEARANCE UR: CLEAR — SIGNIFICANT CHANGE UP
APTT BLD: 32.9 SEC — SIGNIFICANT CHANGE UP (ref 24.5–35.6)
AST SERPL-CCNC: 15 U/L — SIGNIFICANT CHANGE UP (ref 15–37)
BASOPHILS # BLD AUTO: 0.01 K/UL — SIGNIFICANT CHANGE UP (ref 0–0.2)
BASOPHILS NFR BLD AUTO: 0.2 % — SIGNIFICANT CHANGE UP (ref 0–2)
BILIRUB SERPL-MCNC: 0.7 MG/DL — SIGNIFICANT CHANGE UP (ref 0.2–1.2)
BILIRUB UR-MCNC: NEGATIVE — SIGNIFICANT CHANGE UP
BUN SERPL-MCNC: 9 MG/DL — SIGNIFICANT CHANGE UP (ref 7–23)
CALCIUM SERPL-MCNC: 9.1 MG/DL — SIGNIFICANT CHANGE UP (ref 8.5–10.1)
CHLORIDE SERPL-SCNC: 110 MMOL/L — HIGH (ref 96–108)
CO2 SERPL-SCNC: 26 MMOL/L — SIGNIFICANT CHANGE UP (ref 22–31)
COLOR SPEC: YELLOW — SIGNIFICANT CHANGE UP
CREAT SERPL-MCNC: 0.59 MG/DL — SIGNIFICANT CHANGE UP (ref 0.5–1.3)
DIFF PNL FLD: NEGATIVE — SIGNIFICANT CHANGE UP
EGFR: 97 ML/MIN/1.73M2 — SIGNIFICANT CHANGE UP
EOSINOPHIL # BLD AUTO: 0.03 K/UL — SIGNIFICANT CHANGE UP (ref 0–0.5)
EOSINOPHIL NFR BLD AUTO: 0.5 % — SIGNIFICANT CHANGE UP (ref 0–6)
GLUCOSE SERPL-MCNC: 99 MG/DL — SIGNIFICANT CHANGE UP (ref 70–99)
GLUCOSE UR QL: NEGATIVE MG/DL — SIGNIFICANT CHANGE UP
HCT VFR BLD CALC: 36.8 % — SIGNIFICANT CHANGE UP (ref 34.5–45)
HGB BLD-MCNC: 12.5 G/DL — SIGNIFICANT CHANGE UP (ref 11.5–15.5)
IMM GRANULOCYTES NFR BLD AUTO: 0.4 % — SIGNIFICANT CHANGE UP (ref 0–0.9)
INR BLD: 1 RATIO — SIGNIFICANT CHANGE UP (ref 0.85–1.18)
KETONES UR-MCNC: NEGATIVE MG/DL — SIGNIFICANT CHANGE UP
LEUKOCYTE ESTERASE UR-ACNC: ABNORMAL
LIDOCAIN IGE QN: 36 U/L — SIGNIFICANT CHANGE UP (ref 13–75)
LYMPHOCYTES # BLD AUTO: 1.2 K/UL — SIGNIFICANT CHANGE UP (ref 1–3.3)
LYMPHOCYTES # BLD AUTO: 21.2 % — SIGNIFICANT CHANGE UP (ref 13–44)
MAGNESIUM SERPL-MCNC: 2 MG/DL — SIGNIFICANT CHANGE UP (ref 1.6–2.6)
MCHC RBC-ENTMCNC: 31.5 PG — SIGNIFICANT CHANGE UP (ref 27–34)
MCHC RBC-ENTMCNC: 34 G/DL — SIGNIFICANT CHANGE UP (ref 32–36)
MCV RBC AUTO: 92.7 FL — SIGNIFICANT CHANGE UP (ref 80–100)
MONOCYTES # BLD AUTO: 0.29 K/UL — SIGNIFICANT CHANGE UP (ref 0–0.9)
MONOCYTES NFR BLD AUTO: 5.1 % — SIGNIFICANT CHANGE UP (ref 2–14)
NEUTROPHILS # BLD AUTO: 4.11 K/UL — SIGNIFICANT CHANGE UP (ref 1.8–7.4)
NEUTROPHILS NFR BLD AUTO: 72.6 % — SIGNIFICANT CHANGE UP (ref 43–77)
NITRITE UR-MCNC: NEGATIVE — SIGNIFICANT CHANGE UP
NRBC # BLD: 0 /100 WBCS — SIGNIFICANT CHANGE UP (ref 0–0)
NT-PROBNP SERPL-SCNC: 24 PG/ML — SIGNIFICANT CHANGE UP (ref 0–125)
PH UR: 7 — SIGNIFICANT CHANGE UP (ref 5–8)
PLATELET # BLD AUTO: 181 K/UL — SIGNIFICANT CHANGE UP (ref 150–400)
POTASSIUM SERPL-MCNC: 3.2 MMOL/L — LOW (ref 3.5–5.3)
POTASSIUM SERPL-SCNC: 3.2 MMOL/L — LOW (ref 3.5–5.3)
PROT SERPL-MCNC: 7.8 GM/DL — SIGNIFICANT CHANGE UP (ref 6–8.3)
PROT UR-MCNC: NEGATIVE MG/DL — SIGNIFICANT CHANGE UP
PROTHROM AB SERPL-ACNC: 12 SEC — SIGNIFICANT CHANGE UP (ref 9.5–13)
RAPID RVP RESULT: SIGNIFICANT CHANGE UP
RBC # BLD: 3.97 M/UL — SIGNIFICANT CHANGE UP (ref 3.8–5.2)
RBC # FLD: 11.9 % — SIGNIFICANT CHANGE UP (ref 10.3–14.5)
SARS-COV-2 RNA SPEC QL NAA+PROBE: SIGNIFICANT CHANGE UP
SODIUM SERPL-SCNC: 143 MMOL/L — SIGNIFICANT CHANGE UP (ref 135–145)
SP GR SPEC: 1.01 — SIGNIFICANT CHANGE UP (ref 1–1.03)
TROPONIN I, HIGH SENSITIVITY RESULT: 4.7 NG/L — SIGNIFICANT CHANGE UP
UROBILINOGEN FLD QL: 2 MG/DL (ref 0.2–1)
WBC # BLD: 5.66 K/UL — SIGNIFICANT CHANGE UP (ref 3.8–10.5)
WBC # FLD AUTO: 5.66 K/UL — SIGNIFICANT CHANGE UP (ref 3.8–10.5)

## 2024-05-15 PROCEDURE — 71046 X-RAY EXAM CHEST 2 VIEWS: CPT | Mod: 26

## 2024-05-15 PROCEDURE — 99285 EMERGENCY DEPT VISIT HI MDM: CPT

## 2024-05-15 RX ORDER — NITROFURANTOIN MACROCRYSTAL 50 MG
100 CAPSULE ORAL ONCE
Refills: 0 | Status: COMPLETED | OUTPATIENT
Start: 2024-05-15 | End: 2024-05-15

## 2024-05-15 RX ORDER — POTASSIUM CHLORIDE 20 MEQ
40 PACKET (EA) ORAL ONCE
Refills: 0 | Status: COMPLETED | OUTPATIENT
Start: 2024-05-15 | End: 2024-05-15

## 2024-05-15 RX ORDER — NITROFURANTOIN MACROCRYSTAL 50 MG
1 CAPSULE ORAL
Qty: 14 | Refills: 0
Start: 2024-05-15 | End: 2024-05-21

## 2024-05-15 RX ADMIN — Medication 100 MILLIGRAM(S): at 11:39

## 2024-05-15 RX ADMIN — Medication 40 MILLIEQUIVALENT(S): at 11:12

## 2024-05-15 NOTE — ED ADULT NURSE NOTE - OBJECTIVE STATEMENT
70 yo female, A&Ox4, BIBA c/o generalized weakness started this morning and "pounding in both ears" started last night. Patient states she pounding on ears were accompanied by palpitations. No aggravating or relieving factors. Pt denies SOB, N/V, diaphoresis abdominal pain, no history of similar symptoms in the past On PRN O2 at 2L NC. PMH HTN, COPD, Afib, sleep apnea

## 2024-05-15 NOTE — ED PROVIDER NOTE - CROS ED ENMT ALL NEG
Outreach attempt was made to schedule a Medicare Wellness Visit. This was the first attempt. Contact was not made, left message.  
negative...

## 2024-05-15 NOTE — ED ADULT NURSE NOTE - CHIEF COMPLAINT
INTERVAL HPI/OVERNIGHT EVENTS:  Pt seen and examined at bedside.   Pt resting comfortably, BM overnight as per nurse    MEDICATIONS  (STANDING):  chlorhexidine 2% Cloths 1 Application(s) Topical <User Schedule>  dextrose 5% + sodium chloride 0.9%. 1000 milliLiter(s) (75 mL/Hr) IV Continuous <Continuous>  enoxaparin Injectable 40 milliGRAM(s) SubCutaneous daily  glucagon  Injectable 0.5 milliGRAM(s) IV Push once  insulin lispro (ADMELOG) corrective regimen sliding scale   SubCutaneous every 6 hours  midodrine. 5 milliGRAM(s) Oral three times a day  pantoprazole  Injectable 40 milliGRAM(s) IV Push two times a day  piperacillin/tazobactam IVPB.. 3.375 Gram(s) IV Intermittent every 8 hours  QUEtiapine 12.5 milliGRAM(s) Oral at bedtime    MEDICATIONS  (PRN):  acetaminophen  Suppository .. 650 milliGRAM(s) Rectal every 6 hours PRN Temp greater or equal to 38C (100.4F)  bisacodyl Suppository 10 milliGRAM(s) Rectal daily PRN Constipation      Vital Signs Last 24 Hrs  T(C): 37.1 (23 Jan 2022 10:00), Max: 37.2 (23 Jan 2022 09:00)  T(F): 98.8 (23 Jan 2022 10:00), Max: 99 (23 Jan 2022 09:00)  HR: 90 (23 Jan 2022 10:00) (81 - 95)  BP: 119/65 (23 Jan 2022 10:00) (104/59 - 130/70)  BP(mean): 77 (23 Jan 2022 10:00) (69 - 85)  RR: 25 (23 Jan 2022 10:00) (15 - 26)  SpO2: 100% (23 Jan 2022 10:00) (98% - 100%)    Physical:  General: Awake, alert, NAD.  Respirations: Trach to vent   Abdomen: Soft nondistended, nontender. no rebound, no guarding, no peritonitis, PEG to gravity     I&O's Detail    22 Jan 2022 07:01  -  23 Jan 2022 07:00  --------------------------------------------------------  IN:    dextrose 5% + sodium chloride 0.9%: 675 mL    IV PiggyBack: 275 mL    sodium chloride 0.9%: 2000 mL  Total IN: 2950 mL    OUT:    Incontinent per Condom Catheter (mL): 1500 mL  Total OUT: 1500 mL    Total NET: 1450 mL      23 Jan 2022 07:01  -  23 Jan 2022 11:18  --------------------------------------------------------  IN:    dextrose 5% + sodium chloride 0.9%: 300 mL    IV PiggyBack: 25 mL  Total IN: 325 mL    OUT:  Total OUT: 0 mL    Total NET: 325 mL          LABS:                        7.9    6.93  )-----------( 163      ( 23 Jan 2022 05:55 )             25.3             01-23    147<H>  |  119<H>  |  31<H>  ----------------------------<  121<H>  3.3<L>   |  21<L>  |  0.79    Ca    9.3      23 Jan 2022 05:55  Phos  2.7     01-23  Mg     1.9     01-23    TPro  4.4<L>  /  Alb  0.9<L>  /  TBili  0.5  /  DBili  x   /  AST  13  /  ALT  <6<L>  /  AlkPhos  79  01-23     The patient is a 69y Female complaining of weakness.

## 2024-05-15 NOTE — ED PROVIDER NOTE - NSICDXPASTSURGICALHX_GEN_ALL_CORE_FT
PAST SURGICAL HISTORY:  H/O cardiac catheterization in 2012 in [facility]- denies intervention

## 2024-05-15 NOTE — ED ADULT NURSE NOTE - NSFALLHARMRISKINTERV_ED_ALL_ED

## 2024-05-15 NOTE — ED PROVIDER NOTE - GASTROINTESTINAL NEGATIVE STATEMENT, MLM
no abdominal pain, no bloating, no constipation, no diarrhea, no blood in stool, no nausea and no vomiting.

## 2024-05-15 NOTE — ED PROVIDER NOTE - CARE PROVIDER_API CALL
Charly Vasquez  Cardiology  [address] [place] [address]. [address]  [place] [address]  Phone: [phone]  Fax: [phone]  Established Patient  Follow Up Time: 7-10 Days

## 2024-05-15 NOTE — ED PROVIDER NOTE - OBJECTIVE STATEMENT
The patient woke up this morning and noted that she had generalized weakness. She took her pulse and noted it was in the 120s and its associated with increased urination. She became concerned so she called EMS to come to the ED.

## 2024-05-15 NOTE — ED ADULT TRIAGE NOTE - BEFAST ARM SIDE DRIFT
Gen: AAOx3, NAD  Pulm: No respiratory distress, normal effort  Abd: Obese, soft, NT/ND, well healed surgical scars  Ext: No edema, WWP No

## 2024-05-15 NOTE — ED PROVIDER NOTE - CLINICAL SUMMARY MEDICAL DECISION MAKING FREE TEXT BOX
69F c/o generalized weakness  - 69F c/o generalized weakness  -history concerning for uti but given copd will also need to check for resp infx and anemia as she is on ac

## 2024-05-15 NOTE — ED PROVIDER NOTE - CPE EDP NEURO NORM
Forms faxed in to Jessica of [facility],copies sent to scanning
normal...

## 2024-05-15 NOTE — ED ADULT TRIAGE NOTE - CHIEF COMPLAINT QUOTE
BIBA for generalized weakness started this morning and "pounding in both ears" started last night. On 2L NC home o2. PMH HTN, COPD, afib

## 2024-05-15 NOTE — ED PROVIDER NOTE - PATIENT PORTAL LINK FT
You can access the FollowMyHealth Patient Portal offered by [facility] by registering at the following website: [email or web address]. By joining [email or web address]’s FollowMyHealth portal, you will also be able to view your health information using other applications (apps) compatible with our system.

## 2024-05-15 NOTE — ED PROVIDER NOTE - ENMT NEGATIVE STATEMENT, MLM
Ears: no ear pain and no hearing problems. Nose: no nasal congestion no epistaxis, and no nasal drainage. Mouth/Throat: no dysphagia, no hoarseness and no throat pain. Neck: no lumps, no pain, no stiffness and no swollen glands.

## 2024-05-16 LAB
CULTURE RESULTS: SIGNIFICANT CHANGE UP
SPECIMEN SOURCE: SIGNIFICANT CHANGE UP

## 2024-05-17 NOTE — ED POST DISCHARGE NOTE - OTHER
Patient called back ED since she just received a call about her results. Reviewed post discharge note written by JOSE Noriega. Patient has been taking Macrobid as prescribed for UTI, states that she has no urinary symptoms currently. Advised to follow-up w/ PMD.

## 2024-06-10 ENCOUNTER — APPOINTMENT (OUTPATIENT)
Dept: PULMONOLOGY | Facility: CLINIC | Age: 69
End: 2024-06-10

## 2024-06-10 NOTE — HISTORY OF PRESENT ILLNESS
[Former] : former [TextBox_4] :  PMH: 68 y.o.  female former smoker with PMH of GERD, HTN, HLD, A. fib, Asthma/COPD (diffuse centrilobular emphysema on triple therapy and daliresp), pulmonary HTN with PAS 43mmHg on RHC on sildenafil, TAD, stable pulmonary nodules, and severe exercise restriction on home o2.     1. COPD f/b Dr. Epps, maintain Symbicort/Incruse/Daliresp  2. Left lower lobe opacity There was a small, dense left lower lobe opacity in the basal segment of the left lower lobe. Over a period of 2 months, the opacity has migrated anteriorly. I suspect that she has either aspiration pneumonia or organizing pneumonia. We will repeat a CT of the chest in 2 months. My index of suspicion for this being cancer is low because she has migrating opacities.   4. 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 This does not require treatment. In contrast to mycobacterium abscesses, this is not a contraindication to lung transplant.  5. Autoimmune disease In the past she had a ds-DNA ab that was elevated to 83. All the other tests have been negative. Today we will repeat these tests to see if there is any evidence of lupus.  6. Pulmonary hypertension Marce likely falls in the 10% of patients with severe COPD who develop pulmonary hypertension. She is currently on Sildenafil 40mg TID. Tolerating medication well without sx. She has been requested to set an appointment with Dr. Sadie De Paz, but no show 24  7. GEO Marce has GEO and is using CPAP nightly.  8. Pulmonary nodules No change in size of 3mm right upper lobe nodule. Follow up CT will be performed.  9. Health maintenance Marce was administered Prevnar (13 valent) in May 2019. Pneumovax was administered on 21. Prevnar 20 administered on prior visit.    When/how diagnosed with primary pulm dx?, COPD 2/2 smoking.             Oxygen requirement *** at rest *** on exertion *** at night      Quality of life:      Functional status:   [] performs activities of daily living with NO assistance   [] performs activities of daily living with SOME assistance   [] performs activities of daily living with TOTAL assistance      Exposures:      Prior hospitalizations, ICU admission or intubations:      Hx covid infection, blood transfusions or pregnancies:      Health maintenance/vaccines:   COVID vax:      Family History:      Social History:   Lives with:   ETOH/tobacco use: smoked 1/2 PPD, started at age 17, smoked roughly 20 years (quit roughly )      DATA REVIEWED:  PFTs 10/27/2023 FVC: 2.51 L (96%)--> 2.85 L (108%) FEV1: 1.10 L (54%)--> 1.18 L (58%) T.92 L (93%) DLCO: 6.22 (27%)     6MWT 10/27/23: 293 meters, Spo2 95% -> 85%, given 2 L o2 -> 89%.     CT CHEST 3/24/2024 IMPRESSION: 1. Interval resolution of a left lower lobe pneumonia within the posterior basal segment of the left lower lobe with a newly developing area of consolidation involving the left anteromedial basal segment. Short interval surveillance imaging to confirm resolution. 2. Stable groundglass nodule within the apical segment of the right upper lobe unchanged from 2019. 3. Severe centrilobular emphysema. 4. Mild cylindrical bronchiectasis.  2024 IMPRESSION: 1. Since 2023, there is new consolidation with air bronchograms in the left lower lobe, suspicious for pneumonia. 2. A previously seen 5 mm solid nodule in the right upper lobe has resolved. 3. No significant change 5 mm part solid nodule or opacity with possible 3 mm solid component right upper lobe. Continued follow-up chest CT recommended in 3-6 months.    ECHO      RHC/C Cardiac cath 20 PA pressures 38/16 with mean 26. Pulmonary capillary occlusion pressure 14.0 Cardiac output 5.8. Diastolic gradient 2.0. Transpulmonary pressure gradient 12.0 (both normal). PVR (12/5.8) 2.0.  [TextBox_11] : 0.5 [TextBox_13] : 20

## 2024-06-10 NOTE — REASON FOR VISIT
[Initial] : an initial visit [Sleep Apnea] : sleep apnea [COPD] : COPD [Pulmonary Hypertension] : pulmonary hypertension [TextBox_44] : Lung Transplant Evaluation [TextBox_13] : Dr. Larry Epps

## 2024-06-10 NOTE — ASSESSMENT
[FreeTextEntry1] : 68 y.o.  female former smoker with PMH of GERD, HTN, HLD, A. fib, Asthma/COPD (diffuse centrilobular emphysema on triple therapy and daliresp), pulmonary HTN with PAS 43mmHg on RHC on sildenafil, TAD, stable pulmonary nodules, and severe exercise restriction on home o2.    #Lung Transplant Evaluation      FOLLOW UPS:      RTC in *****  Patient asked relevant questions which were answered. Patient education provided. Teach back performed.  Above discussed with Dr. Sin

## 2024-07-14 ENCOUNTER — OUTPATIENT (OUTPATIENT)
Dept: OUTPATIENT SERVICES | Facility: HOSPITAL | Age: 69
LOS: 1 days | End: 2024-07-14
Payer: MEDICARE

## 2024-07-14 PROCEDURE — 71250 CT THORAX DX C-: CPT

## 2024-07-14 PROCEDURE — 71250 CT THORAX DX C-: CPT | Mod: 26,MH

## 2024-07-15 ENCOUNTER — NON-APPOINTMENT (OUTPATIENT)
Age: 69
End: 2024-07-15

## 2024-08-22 NOTE — ED ADULT TRIAGE NOTE - NS ED NURSE BANDS TYPE
SW/CM Ongoing Pediatric Plan of Care Note     Support Systems   Family members    Parent/Family Goals  Patient/Family Discharge Goal: Home     Identified Barriers to Discharge  Medical clearance and ongoing inpatient management     Recommendations for Discharge  Home    Disposition  Home with parent    Progress Note  Will is a 14 yo M who on 8/14 underwent diagnostic laparoscopy with drainage of intra-abdominal abscesses, washout, and lap appendectomy.  On 8/21 underwent IR drainage of 2 intraabdominal abscesses and drain placement x2.      Continues on IV Abx.  Discussion nutritional needs, possible parenteral nutrition.     Pediatric Surgery is primary, Pediatric Hospitalist on consult for co-management.     Ami Blanchard BSN RN CPN  Pediatric Care Manager   [phone]     Name band;

## 2024-09-08 ENCOUNTER — INPATIENT (INPATIENT)
Facility: HOSPITAL | Age: 69
LOS: 1 days | Discharge: HOME | End: 2024-09-10
Attending: STUDENT IN AN ORGANIZED HEALTH CARE EDUCATION/TRAINING PROGRAM | Admitting: STUDENT IN AN ORGANIZED HEALTH CARE EDUCATION/TRAINING PROGRAM
Payer: MEDICARE

## 2024-09-08 VITALS
WEIGHT: 160.06 LBS | RESPIRATION RATE: 20 BRPM | OXYGEN SATURATION: 94 % | TEMPERATURE: 99 F | SYSTOLIC BLOOD PRESSURE: 110 MMHG | HEART RATE: 107 BPM | HEIGHT: 66 IN | DIASTOLIC BLOOD PRESSURE: 87 MMHG

## 2024-09-08 LAB
ALBUMIN SERPL ELPH-MCNC: 3.6 G/DL — SIGNIFICANT CHANGE UP (ref 3.3–5)
ALP SERPL-CCNC: 57 U/L — SIGNIFICANT CHANGE UP (ref 40–120)
ALT FLD-CCNC: 18 U/L — SIGNIFICANT CHANGE UP (ref 12–78)
ANION GAP SERPL CALC-SCNC: 8 MMOL/L — SIGNIFICANT CHANGE UP (ref 5–17)
APPEARANCE UR: CLEAR — SIGNIFICANT CHANGE UP
AST SERPL-CCNC: 13 U/L — LOW (ref 15–37)
BACTERIA # UR AUTO: ABNORMAL /HPF
BASOPHILS # BLD AUTO: 0.02 K/UL — SIGNIFICANT CHANGE UP (ref 0–0.2)
BASOPHILS NFR BLD AUTO: 0.3 % — SIGNIFICANT CHANGE UP (ref 0–2)
BILIRUB SERPL-MCNC: 0.6 MG/DL — SIGNIFICANT CHANGE UP (ref 0.2–1.2)
BILIRUB UR-MCNC: NEGATIVE — SIGNIFICANT CHANGE UP
BUN SERPL-MCNC: 9 MG/DL — SIGNIFICANT CHANGE UP (ref 7–23)
CALCIUM SERPL-MCNC: 9.1 MG/DL — SIGNIFICANT CHANGE UP (ref 8.5–10.1)
CHLORIDE SERPL-SCNC: 108 MMOL/L — SIGNIFICANT CHANGE UP (ref 96–108)
CO2 SERPL-SCNC: 26 MMOL/L — SIGNIFICANT CHANGE UP (ref 22–31)
COLOR SPEC: YELLOW — SIGNIFICANT CHANGE UP
CREAT SERPL-MCNC: 0.7 MG/DL — SIGNIFICANT CHANGE UP (ref 0.5–1.3)
DIFF PNL FLD: ABNORMAL
EGFR: 93 ML/MIN/1.73M2 — SIGNIFICANT CHANGE UP
EOSINOPHIL # BLD AUTO: 0.04 K/UL — SIGNIFICANT CHANGE UP (ref 0–0.5)
EOSINOPHIL NFR BLD AUTO: 0.5 % — SIGNIFICANT CHANGE UP (ref 0–6)
EPI CELLS # UR: PRESENT
GLUCOSE SERPL-MCNC: 97 MG/DL — SIGNIFICANT CHANGE UP (ref 70–99)
GLUCOSE UR QL: NEGATIVE MG/DL — SIGNIFICANT CHANGE UP
HCT VFR BLD CALC: 37 % — SIGNIFICANT CHANGE UP (ref 34.5–45)
HGB BLD-MCNC: 12.7 G/DL — SIGNIFICANT CHANGE UP (ref 11.5–15.5)
IMM GRANULOCYTES NFR BLD AUTO: 0.1 % — SIGNIFICANT CHANGE UP (ref 0–0.9)
KETONES UR-MCNC: NEGATIVE MG/DL — SIGNIFICANT CHANGE UP
LEUKOCYTE ESTERASE UR-ACNC: ABNORMAL
LYMPHOCYTES # BLD AUTO: 2.67 K/UL — SIGNIFICANT CHANGE UP (ref 1–3.3)
LYMPHOCYTES # BLD AUTO: 35.9 % — SIGNIFICANT CHANGE UP (ref 13–44)
MCHC RBC-ENTMCNC: 31.7 PG — SIGNIFICANT CHANGE UP (ref 27–34)
MCHC RBC-ENTMCNC: 34.3 G/DL — SIGNIFICANT CHANGE UP (ref 32–36)
MCV RBC AUTO: 92.3 FL — SIGNIFICANT CHANGE UP (ref 80–100)
MONOCYTES # BLD AUTO: 0.54 K/UL — SIGNIFICANT CHANGE UP (ref 0–0.9)
MONOCYTES NFR BLD AUTO: 7.3 % — SIGNIFICANT CHANGE UP (ref 2–14)
NEUTROPHILS # BLD AUTO: 4.16 K/UL — SIGNIFICANT CHANGE UP (ref 1.8–7.4)
NEUTROPHILS NFR BLD AUTO: 55.9 % — SIGNIFICANT CHANGE UP (ref 43–77)
NITRITE UR-MCNC: NEGATIVE — SIGNIFICANT CHANGE UP
NRBC # BLD: 0 /100 WBCS — SIGNIFICANT CHANGE UP (ref 0–0)
PH UR: 6 — SIGNIFICANT CHANGE UP (ref 5–8)
PLATELET # BLD AUTO: 213 K/UL — SIGNIFICANT CHANGE UP (ref 150–400)
POTASSIUM SERPL-MCNC: 3.5 MMOL/L — SIGNIFICANT CHANGE UP (ref 3.5–5.3)
POTASSIUM SERPL-SCNC: 3.5 MMOL/L — SIGNIFICANT CHANGE UP (ref 3.5–5.3)
PROT SERPL-MCNC: 7.5 GM/DL — SIGNIFICANT CHANGE UP (ref 6–8.3)
PROT UR-MCNC: SIGNIFICANT CHANGE UP MG/DL
RBC # BLD: 4.01 M/UL — SIGNIFICANT CHANGE UP (ref 3.8–5.2)
RBC # FLD: 12.5 % — SIGNIFICANT CHANGE UP (ref 10.3–14.5)
RBC CASTS # UR COMP ASSIST: 5 /HPF — HIGH (ref 0–4)
SODIUM SERPL-SCNC: 142 MMOL/L — SIGNIFICANT CHANGE UP (ref 135–145)
SP GR SPEC: 1.01 — SIGNIFICANT CHANGE UP (ref 1–1.03)
UROBILINOGEN FLD QL: 1 MG/DL — SIGNIFICANT CHANGE UP (ref 0.2–1)
WBC # BLD: 7.44 K/UL — SIGNIFICANT CHANGE UP (ref 3.8–10.5)
WBC # FLD AUTO: 7.44 K/UL — SIGNIFICANT CHANGE UP (ref 3.8–10.5)
WBC UR QL: 6 /HPF — HIGH (ref 0–5)

## 2024-09-08 PROCEDURE — 93010 ELECTROCARDIOGRAM REPORT: CPT

## 2024-09-08 PROCEDURE — 99223 1ST HOSP IP/OBS HIGH 75: CPT

## 2024-09-08 PROCEDURE — 99285 EMERGENCY DEPT VISIT HI MDM: CPT

## 2024-09-08 PROCEDURE — 71046 X-RAY EXAM CHEST 2 VIEWS: CPT | Mod: 26

## 2024-09-08 RX ORDER — SODIUM CHLORIDE 9 MG/ML
1000 INJECTION INTRAMUSCULAR; INTRAVENOUS; SUBCUTANEOUS ONCE
Refills: 0 | Status: COMPLETED | OUTPATIENT
Start: 2024-09-08 | End: 2024-09-08

## 2024-09-08 RX ORDER — DILTIAZEM HYDROCHLORIDE 5 MG/ML
10 INJECTION INTRAVENOUS ONCE
Refills: 0 | Status: COMPLETED | OUTPATIENT
Start: 2024-09-08 | End: 2024-09-08

## 2024-09-08 RX ADMIN — Medication 100 MILLIGRAM(S): at 21:00

## 2024-09-08 RX ADMIN — Medication 1000 MILLIGRAM(S): at 21:32

## 2024-09-08 RX ADMIN — DILTIAZEM HYDROCHLORIDE 10 MILLIGRAM(S): 5 INJECTION INTRAVENOUS at 21:30

## 2024-09-08 RX ADMIN — SODIUM CHLORIDE 1000 MILLILITER(S): 9 INJECTION INTRAMUSCULAR; INTRAVENOUS; SUBCUTANEOUS at 20:01

## 2024-09-08 NOTE — ED ADULT TRIAGE NOTE - CHIEF COMPLAINT QUOTE
c/o generalized weakness since morning denies fever denies n/v c/o shortness of breath hx copd uses home o2 prn used today with some relief denies chest pain denies any pain at present

## 2024-09-08 NOTE — ED PROVIDER NOTE - PHYSICAL EXAMINATION
General: Appears well and nontoxic  Mentation: AAO x 3  psych: mood appropriate  HEENT: airway patent, conjunctivae clear bilaterally  Resp: symmetric chest rise, no resp distress, breath sounds CTA bilaterally  Cardio: Tachycardia, Afib  GI: soft/nondistended/nontender  Neuro: sensation and motor function grossly intact  Skin: no cyanosis, no jaundice  MSK: normal movement of all extremities  Lymph/Vasc: no LE edema Undermining Type: Entire Wound

## 2024-09-08 NOTE — ED ADULT NURSE NOTE - OBJECTIVE STATEMENT
Pt aaox3. Pt c/o generalized weakness started this am, sob. Pt walk with steady gait.  Pmhx Copd use oxygen at home prn, afib on eliquis, Asthma. Allergy Oxy

## 2024-09-08 NOTE — ED ADULT NURSE NOTE - NSFALLUNIVINTERV_ED_ALL_ED
Bed/Stretcher in lowest position, wheels locked, appropriate side rails in place/Call bell, personal items and telephone in reach/Instruct patient to call for assistance before getting out of bed/chair/stretcher/Non-slip footwear applied when patient is off stretcher/[place] to call system/Physically safe environment - no spills, clutter or unnecessary equipment/Purposeful proactive rounding/Room/bathroom lighting operational, light cord in reach

## 2024-09-08 NOTE — ED PROVIDER NOTE - NSICDXPASTSURGICALHX_GEN_ALL_CORE_FT
PAST SURGICAL HISTORY:  H/O cardiac catheterization in 2012 in [facility]- denies intervention

## 2024-09-08 NOTE — ED PROVIDER NOTE - CLINICAL SUMMARY MEDICAL DECISION MAKING FREE TEXT BOX
69-year-old female with a past medical history of hypertension, hyperlipidemia, A-fib on Eliquis and diltiazem, COPD presenting with general malaise. Patient states she woke up feeling well. In the middle of the day she started feeling generally unwell. Denies chest pain, difficulty breathing, palpitations, nausea, vomiting, abdominal pain, dysuria. Upon arrival to the emergency department patient's EKG showed A-fib with RVR. Patient states she is compliant with medications. Physical exam reveals well-appearing individual, heart rate irregular and tachycardic, clear breath sounds bilaterally, soft nontender abdomen, no lower extremity edema. Labs, heart monitor, IV fluids, chest x-ray, urinalysis.

## 2024-09-08 NOTE — ED ADULT TRIAGE NOTE - AVIAN FLU EXPOSURE
Jerry Muller  YOB: 1952    Date of Service:  3/6/2018      HPI:  Dannielle Pollard was seen in the internal medicine office today for:  Chief Complaint  Patient presents with  · Atrial fibrillation. · Dyslipidemia. · Impaired fasting glucose. · Javed. · and continued evaluation and management of chronic medical problems. We addressed the following new, acute or uncontrolled/unstable issues:    1. The atrial fibrillation remains the biggest issue. We had seen him for this just about a month ago. He did see Dr. Maggie Sanchez the next day. He was started on Eliquis and flecainide. He feels lousy nonspecifically on the flecainide. He just does not like the medicine. He has had 3 more bouts of atrial fibrillation. The most recent was about a week ago and it just lasted a few minutes. Then about 3 weeks before that he had 15 to 20 minutes of atrial fibrillation so overall he has had a rather dramatic improvement with the frequency but he still does not like the flecainide. Dr. Maggie Sanchez has been managing his case. They did a stress test, non-nuclear. We reviewed this. It was nondiagnostic. He did exercise 10 METS, however. He did not have chest pain but did become dyspneic. He had an echo which showed a little bit of mitral regurgitation and aortic regurgitation. The left atrium was 43 mm which is not too big. He is not very excited about the Eliquis. We talked about his CHADS VASc 2 score and risk for stroke. Dr. Maggie Sanchez felt he should be on the Eliquis. He has questions about other options other than the flecainide. I spoke with Dr. Clyde Lazo on the phone as well and he is wondering about possibly propafenone as an alternate. The patient has a close relationship with Dr. Clyde Lazo because of his wife and is going to be talking with him tomorrow and we will see how that visit goes. We also discussed the possibility of ablation which might play a role here in this case.   When he has the atrial fibrillation he is aware of it. He feels palpitations. He does not have dizziness. He is no longer having chest pain with this. He is not dyspneic. He has not had signs or symptoms of stroke. No bleeding. We addressed the following chronic issues:    · Lipids - He continues to work on diet. · Impaired fasting glucose  - We discussed diet and activity level as it relates to blood sugars and he is working on this. · He also has tended to have occasional burns on his hands from his work and is asking to have some Silvadene available which I think is reasonable. · We talked about the shingles vaccine which he wants to wait on. I encouraged him to get the Prevnar though and the tetanus. Because he has [employer] I gave him prescriptions for these. Review of Systems:  Constitutional:  Negative for chills, fever, and weight loss. Positive for malaise. HENT:  Negative for congestion, ear pain, and sore throat. Eyes:  Negative for blurred vision, double vision, pain and discharge. Respiratory:  Negative for cough, shortness of breath, and wheezing. Cardiovascular:  Negative for chest pain, palpitations, and PND. Positive for atrial fibrillation. Gastrointestinal:  Negative for abdominal pain, blood in stool, constipation, diarrhea, nausea and vomiting. Genitourinary:  Negative for dysuria, frequency, and hematuria. Musculoskeletal:  Negative for myalgias. Skin:  Negative for rash. Neurological:  Negative for tingling, sensory change, speech change, focal weakness, seizures, and headaches. Endo/Heme/Allergies:  Does not bruise/bleed easily. Psychiatric/Behavioral:  Negative for hallucinations and suicidal ideas.        Patient Active Problem List   Diagnosis    Impaired fasting glucose    Lipid disorder    Osteoarthritis of right shoulder    Seasonal allergic rhinitis    Paroxysmal atrial fibrillation (HCC)    Non-rheumatic mitral regurgitation    Burn Medications:    Current Outpatient Prescriptions   Medication Sig Dispense Refill    silver sulfADIAZINE (SILVADENE) 1 % cream Apply topically daily prn burn 400 g 0    ELIQUIS 5 MG TABS tablet Take 5 mg by mouth 2 times daily  1    flecainide (TAMBOCOR) 100 MG tablet taking 50 mg qam, 100 mg qpm       No current facility-administered medications for this visit. Past Medical History:        Diagnosis Date    Erectile dysfunction     Viagra, intolerant    Headache syndrome, complicated     clearly consistent with migraine headaches in the past, more recently with tension type headaches.  Hemorrhoids     Impaired fasting glucose 09/07    4.1) Two hour postprandial glucose 84    Lipid disorder     8.5% 10 yr risk- calc 6/1/15    Non-rheumatic mitral regurgitation 3/6/2018    1+ on echo 1+ AR 2/17, Nondiagnostic exercise stress test 2/17 in [place]    Osteoarthritis of right shoulder 7/10/2015    Paroxysmal atrial fibrillation ([facility] [address] 6/2/7971    Umbilical hernia        Family Hx and Social Hx    family history includes Asthma in his father; Other in his mother; Pacemaker in his mother. History   Smoking Status    Never Smoker   Smokeless Tobacco    Never Used     History   Alcohol Use    0.0 oz/week     Comment: Infrequent alcohol, perhaps 2 or 3 drinks a year       Vitals:    03/06/18 1436   BP: (!) 90/44   Site: Right Arm   Position: Sitting   Cuff Size: Large Adult   Pulse: 60   Weight: 169 lb (76.7 kg)   Height: 5' 6\" (1.676 m)        Vitals Reviewed. Body mass index is 27.28 kg/m². Wt Readings from Last 3 Encounters:   03/06/18 169 lb (76.7 kg)   02/06/18 176 lb 12.8 oz (80.2 kg)   01/22/18 175 lb 12.8 oz (79.7 kg)     BP Readings from Last 3 Encounters:   03/06/18 (!) 90/44   02/06/18 (!) 102/56   01/22/18 120/68       Physical Examination:  Constitutional:  He appears well-developed and well-nourished. No distress. HENT:  Head: Normocephalic and atraumatic.   Right Ear: No

## 2024-09-09 DIAGNOSIS — R82.90 UNSPECIFIED ABNORMAL FINDINGS IN URINE: ICD-10-CM

## 2024-09-09 DIAGNOSIS — I48.91 UNSPECIFIED ATRIAL FIBRILLATION: ICD-10-CM

## 2024-09-09 DIAGNOSIS — Z86.69 PERSONAL HISTORY OF OTHER DISEASES OF THE NERVOUS SYSTEM AND SENSE ORGANS: ICD-10-CM

## 2024-09-09 DIAGNOSIS — Z87.09 PERSONAL HISTORY OF OTHER DISEASES OF THE RESPIRATORY SYSTEM: ICD-10-CM

## 2024-09-09 LAB
A1C WITH ESTIMATED AVERAGE GLUCOSE RESULT: 5 % — SIGNIFICANT CHANGE UP (ref 4–5.6)
ALBUMIN SERPL ELPH-MCNC: 3.5 G/DL — SIGNIFICANT CHANGE UP (ref 3.3–5)
ALP SERPL-CCNC: 51 U/L — SIGNIFICANT CHANGE UP (ref 40–120)
ALT FLD-CCNC: 18 U/L — SIGNIFICANT CHANGE UP (ref 12–78)
ANION GAP SERPL CALC-SCNC: 4 MMOL/L — LOW (ref 5–17)
AST SERPL-CCNC: 16 U/L — SIGNIFICANT CHANGE UP (ref 15–37)
BILIRUB SERPL-MCNC: 0.8 MG/DL — SIGNIFICANT CHANGE UP (ref 0.2–1.2)
BUN SERPL-MCNC: 7 MG/DL — SIGNIFICANT CHANGE UP (ref 7–23)
CALCIUM SERPL-MCNC: 8.7 MG/DL — SIGNIFICANT CHANGE UP (ref 8.5–10.1)
CHLORIDE SERPL-SCNC: 114 MMOL/L — HIGH (ref 96–108)
CHOLEST SERPL-MCNC: 165 MG/DL — SIGNIFICANT CHANGE UP
CO2 SERPL-SCNC: 25 MMOL/L — SIGNIFICANT CHANGE UP (ref 22–31)
CREAT SERPL-MCNC: 0.63 MG/DL — SIGNIFICANT CHANGE UP (ref 0.5–1.3)
CULTURE RESULTS: SIGNIFICANT CHANGE UP
EGFR: 95 ML/MIN/1.73M2 — SIGNIFICANT CHANGE UP
ESTIMATED AVERAGE GLUCOSE: 97 MG/DL — SIGNIFICANT CHANGE UP (ref 68–114)
GLUCOSE SERPL-MCNC: 98 MG/DL — SIGNIFICANT CHANGE UP (ref 70–99)
HCT VFR BLD CALC: 38.5 % — SIGNIFICANT CHANGE UP (ref 34.5–45)
HDLC SERPL-MCNC: 73 MG/DL — SIGNIFICANT CHANGE UP
HGB BLD-MCNC: 13 G/DL — SIGNIFICANT CHANGE UP (ref 11.5–15.5)
LIPID PNL WITH DIRECT LDL SERPL: 77 MG/DL — SIGNIFICANT CHANGE UP
MCHC RBC-ENTMCNC: 31.3 PG — SIGNIFICANT CHANGE UP (ref 27–34)
MCHC RBC-ENTMCNC: 33.8 G/DL — SIGNIFICANT CHANGE UP (ref 32–36)
MCV RBC AUTO: 92.5 FL — SIGNIFICANT CHANGE UP (ref 80–100)
NON HDL CHOLESTEROL: 92 MG/DL — SIGNIFICANT CHANGE UP
NRBC # BLD: 0 /100 WBCS — SIGNIFICANT CHANGE UP (ref 0–0)
PLATELET # BLD AUTO: 226 K/UL — SIGNIFICANT CHANGE UP (ref 150–400)
POTASSIUM SERPL-MCNC: 3.8 MMOL/L — SIGNIFICANT CHANGE UP (ref 3.5–5.3)
POTASSIUM SERPL-SCNC: 3.8 MMOL/L — SIGNIFICANT CHANGE UP (ref 3.5–5.3)
PROT SERPL-MCNC: 7.3 GM/DL — SIGNIFICANT CHANGE UP (ref 6–8.3)
RBC # BLD: 4.16 M/UL — SIGNIFICANT CHANGE UP (ref 3.8–5.2)
RBC # FLD: 12.7 % — SIGNIFICANT CHANGE UP (ref 10.3–14.5)
SODIUM SERPL-SCNC: 143 MMOL/L — SIGNIFICANT CHANGE UP (ref 135–145)
SPECIMEN SOURCE: SIGNIFICANT CHANGE UP
TRIGL SERPL-MCNC: 76 MG/DL — SIGNIFICANT CHANGE UP
WBC # BLD: 6.83 K/UL — SIGNIFICANT CHANGE UP (ref 3.8–10.5)
WBC # FLD AUTO: 6.83 K/UL — SIGNIFICANT CHANGE UP (ref 3.8–10.5)

## 2024-09-09 RX ORDER — DORZOLAMIDE HCL 2 %
1 DROPS OPHTHALMIC (EYE)
Refills: 0 | Status: DISCONTINUED | OUTPATIENT
Start: 2024-09-09 | End: 2024-09-10

## 2024-09-09 RX ORDER — DILTIAZEM HYDROCHLORIDE 5 MG/ML
120 INJECTION INTRAVENOUS ONCE
Refills: 0 | Status: COMPLETED | OUTPATIENT
Start: 2024-09-09 | End: 2024-09-09

## 2024-09-09 RX ORDER — BRIMONIDINE TARTRATE 2 MG/ML
1 SOLUTION/ DROPS OPHTHALMIC
Refills: 0 | Status: DISCONTINUED | OUTPATIENT
Start: 2024-09-09 | End: 2024-09-10

## 2024-09-09 RX ORDER — KETOROLAC TROMETHAMINE 0.5 %
1 DROPS OPHTHALMIC (EYE)
Refills: 0 | Status: DISCONTINUED | OUTPATIENT
Start: 2024-09-09 | End: 2024-09-10

## 2024-09-09 RX ORDER — DILTIAZEM HYDROCHLORIDE 5 MG/ML
240 INJECTION INTRAVENOUS DAILY
Refills: 0 | Status: DISCONTINUED | OUTPATIENT
Start: 2024-09-10 | End: 2024-09-10

## 2024-09-09 RX ORDER — DILTIAZEM HYDROCHLORIDE 5 MG/ML
120 INJECTION INTRAVENOUS DAILY
Refills: 0 | Status: DISCONTINUED | OUTPATIENT
Start: 2024-09-09 | End: 2024-09-09

## 2024-09-09 RX ORDER — UMECLIDINIUM 62.5 UG/1
0 AEROSOL, POWDER ORAL
Refills: 0 | DISCHARGE

## 2024-09-09 RX ORDER — ACETAMINOPHEN 325 MG/1
650 TABLET ORAL EVERY 6 HOURS
Refills: 0 | Status: DISCONTINUED | OUTPATIENT
Start: 2024-09-09 | End: 2024-09-10

## 2024-09-09 RX ORDER — ONDANSETRON 2 MG/ML
4 INJECTION, SOLUTION INTRAMUSCULAR; INTRAVENOUS EVERY 8 HOURS
Refills: 0 | Status: DISCONTINUED | OUTPATIENT
Start: 2024-09-09 | End: 2024-09-10

## 2024-09-09 RX ORDER — APIXABAN 5 MG/1
5 TABLET, FILM COATED ORAL EVERY 12 HOURS
Refills: 0 | Status: DISCONTINUED | OUTPATIENT
Start: 2024-09-09 | End: 2024-09-10

## 2024-09-09 RX ORDER — FLUTICASONE PROPIONATE AND SALMETEROL 250; 50 UG/1; UG/1
1 POWDER RESPIRATORY (INHALATION)
Refills: 0 | Status: DISCONTINUED | OUTPATIENT
Start: 2024-09-09 | End: 2024-09-09

## 2024-09-09 RX ORDER — MONTELUKAST SODIUM 5 MG/1
10 TABLET, CHEWABLE ORAL AT BEDTIME
Refills: 0 | Status: DISCONTINUED | OUTPATIENT
Start: 2024-09-09 | End: 2024-09-10

## 2024-09-09 RX ORDER — ROFLUMILAST 500 UG/1
500 TABLET ORAL DAILY
Refills: 0 | Status: DISCONTINUED | OUTPATIENT
Start: 2024-09-09 | End: 2024-09-10

## 2024-09-09 RX ORDER — MAGNESIUM, ALUMINUM HYDROXIDE 200-225/5
30 SUSPENSION, ORAL (FINAL DOSE FORM) ORAL EVERY 4 HOURS
Refills: 0 | Status: DISCONTINUED | OUTPATIENT
Start: 2024-09-09 | End: 2024-09-10

## 2024-09-09 RX ORDER — KETOROLAC TROMETHAMINE 0.5 %
1 DROPS OPHTHALMIC (EYE)
Refills: 0 | DISCHARGE

## 2024-09-09 RX ORDER — SILDENAFIL 25 MG/1
40 TABLET, FILM COATED ORAL THREE TIMES A DAY
Refills: 0 | Status: DISCONTINUED | OUTPATIENT
Start: 2024-09-09 | End: 2024-09-10

## 2024-09-09 RX ORDER — UMECLIDINIUM 62.5 UG/1
1 AEROSOL, POWDER ORAL
Refills: 0 | DISCHARGE

## 2024-09-09 RX ORDER — BRINZOLAMIDE/BRIMONIDINE TARTRATE 10; 2 MG/ML; MG/ML
1 SUSPENSION/ DROPS OPHTHALMIC
Refills: 0 | DISCHARGE

## 2024-09-09 RX ORDER — OFLOXACIN 3 MG/ML
1 SOLUTION/ DROPS OPHTHALMIC
Refills: 0 | DISCHARGE

## 2024-09-09 RX ORDER — BUDESONIDE AND FORMOTEROL FUMARATE 80; 4.5 UG/1; UG/1
2 AEROSOL, METERED RESPIRATORY (INHALATION)
Refills: 0 | Status: DISCONTINUED | OUTPATIENT
Start: 2024-09-09 | End: 2024-09-10

## 2024-09-09 RX ORDER — OFLOXACIN 3 MG/ML
1 SOLUTION/ DROPS OPHTHALMIC
Refills: 0 | Status: DISCONTINUED | OUTPATIENT
Start: 2024-09-09 | End: 2024-09-10

## 2024-09-09 RX ORDER — PROPAFENONE HCL 225 MG
150 CAPSULE, EXTENDED RELEASE 12 HR ORAL THREE TIMES A DAY
Refills: 0 | Status: DISCONTINUED | OUTPATIENT
Start: 2024-09-09 | End: 2024-09-09

## 2024-09-09 RX ORDER — BUDESONIDE AND FORMOTEROL FUMARATE 80; 4.5 UG/1; UG/1
2 AEROSOL, METERED RESPIRATORY (INHALATION)
Refills: 0 | DISCHARGE

## 2024-09-09 RX ORDER — LIDOCAINE/BENZALKONIUM/ALCOHOL
1 SOLUTION, NON-ORAL TOPICAL DAILY
Refills: 0 | Status: DISCONTINUED | OUTPATIENT
Start: 2024-09-09 | End: 2024-09-10

## 2024-09-09 RX ADMIN — Medication 150 MILLIGRAM(S): at 06:54

## 2024-09-09 RX ADMIN — BUDESONIDE AND FORMOTEROL FUMARATE 2 PUFF(S): 80; 4.5 AEROSOL, METERED RESPIRATORY (INHALATION) at 18:09

## 2024-09-09 RX ADMIN — SILDENAFIL 40 MILLIGRAM(S): 25 TABLET, FILM COATED ORAL at 21:51

## 2024-09-09 RX ADMIN — APIXABAN 5 MILLIGRAM(S): 5 TABLET, FILM COATED ORAL at 17:50

## 2024-09-09 RX ADMIN — DILTIAZEM HYDROCHLORIDE 120 MILLIGRAM(S): 5 INJECTION INTRAVENOUS at 06:36

## 2024-09-09 RX ADMIN — APIXABAN 5 MILLIGRAM(S): 5 TABLET, FILM COATED ORAL at 06:36

## 2024-09-09 RX ADMIN — SILDENAFIL 40 MILLIGRAM(S): 25 TABLET, FILM COATED ORAL at 14:13

## 2024-09-09 RX ADMIN — OFLOXACIN 1 DROP(S): 3 SOLUTION/ DROPS OPHTHALMIC at 17:52

## 2024-09-09 RX ADMIN — Medication 10 MILLIGRAM(S): at 21:52

## 2024-09-09 RX ADMIN — BRIMONIDINE TARTRATE 1 DROP(S): 2 SOLUTION/ DROPS OPHTHALMIC at 21:38

## 2024-09-09 RX ADMIN — Medication 1 DROP(S): at 18:08

## 2024-09-09 RX ADMIN — MONTELUKAST SODIUM 10 MILLIGRAM(S): 5 TABLET, CHEWABLE ORAL at 21:51

## 2024-09-09 RX ADMIN — DILTIAZEM HYDROCHLORIDE 120 MILLIGRAM(S): 5 INJECTION INTRAVENOUS at 14:05

## 2024-09-09 RX ADMIN — Medication 1 DROP(S): at 17:52

## 2024-09-09 RX ADMIN — Medication 1 PATCH: at 19:38

## 2024-09-09 RX ADMIN — Medication 1 PATCH: at 14:10

## 2024-09-09 NOTE — PROGRESS NOTE ADULT - PROBLEM SELECTOR PLAN 1
- Rate control with home Diltiazem of 240 mg CD  - Monitor on tele  - Patient stopped her home Propafenone one month prior because it wasn't effective  - Has an appointment with her cardiologist at [facility] in two weeks and is pending an ablation  - TTE low yield so defer  - If HR is okay will discharge w/ OP follow up. Rates may be fluctuating. If difficult to control will consult cardiology  - Continue with Eliquis

## 2024-09-09 NOTE — H&P ADULT - NSHPPHYSICALEXAM_GEN_ALL_CORE
GENERAL: NAD, comfortable in bed  HEAD:  Atraumatic, Normocephalic  EYES: EOMI, PERRL, conjunctiva and sclera clear  NECK: Supple, No JVD  LUNG: Clear to auscultation bilaterally  HEART: Irregularly irregular. No murmurs, rubs, or gallops  ABDOMEN: Soft, Nontender, Nondistended; Normal Bowel sounds   PSYCH: normal mood and affect  NEUROLOGY: AAOx3, non-focal   SKIN: No rashes or lesions

## 2024-09-09 NOTE — H&P ADULT - ASSESSMENT
69-year-old female with a PMH of HTN, HLD, Afib, COPD (home 02 PRN) presents to the ED for acute onset of fatigue and shortness of breath started in the afternoon. Endorses in the morning when she woke up, she was well then as the day progresses, she started to fell unwell, used home O2 with minimal relief which prompted ED visit. In the ED patient was found to be in Afib with RVR. Endorses she has been complaint with her medication. Denies chest pain, palpitation, dizziness, headache, N/V/D/C, dysuria, or any other acute symptoms. Labs unremarkable.  Normotensive. HR:118. Afebrile. UA: Moderate Leuk-esterase, 6-WBC. Received Ceftriaxone. 1L-NS, Diltiazem 10mg IV.

## 2024-09-09 NOTE — H&P ADULT - NSICDXPASTMEDICALHX_GEN_ALL_CORE_FT
Render In Strict Bullet Format?: No Detail Level: Zone Defer Treatment (Provide Reason For Deferment In Text Field Below): Clobetesol PAST MEDICAL HISTORY:  Asthma     Atrial fibrillation     Chronic sinusitis     COPD (chronic obstructive pulmonary disease)     Essential hypertension     GERD (gastroesophageal reflux disease)     Hypercholesterolemia     Sleep apnea

## 2024-09-09 NOTE — H&P ADULT - PROBLEM SELECTOR PLAN 3
UA: Moderate Leuk-esterase, 6-WBC.  Received Ceftriaxone in the ED   NO urinary symptom   - Continue antibiotics if Cx is positive

## 2024-09-09 NOTE — H&P ADULT - PROBLEM SELECTOR PLAN 1
Upon ED arrival found to be in Afib with RVR . Received Diltiazem 10mg IV   last Echo in file was  2021   - Tele  - Eliquis 5mg BID  - Diltiazem 120mg   - Monitor Upon ED arrival found to be in Afib with RVR . Received Diltiazem 10mg IV   last Echo in file was  2021   - Tele  - Eliquis 5mg BID  - Diltiazem 120mg   - Propafenone 150mg TID   - Monitor

## 2024-09-09 NOTE — PROGRESS NOTE ADULT - ASSESSMENT
69F with PMHx of HTN, chronic atrial fibrillation, GEO (on CPAP) COPD (with home O2), and pulmonary hypertension presenting for SOB and fatigue. Patient found to have atrial fibrillation with RVR which correlates with her symptoms. Patient admitted for further management of her atrial fibrillation.

## 2024-09-09 NOTE — PATIENT PROFILE ADULT - FALL HARM RISK - RISK INTERVENTIONS
Communicate Fall Risk and Risk Factors to all staff, patient, and family/Reinforce activity limits and safety measures with patient and family/Use of alarms - bed, chair and/or voice tab/Visual Cue: Yellow wristband/Bed in lowest position, wheels locked, appropriate side rails in place/Call bell, personal items and telephone in reach/Instruct patient to call for assistance before getting out of bed or chair/Non-slip footwear when patient is out of bed/[place] to call system/Physically safe environment - no spills, clutter or unnecessary equipment/Purposeful Proactive Rounding/Room/bathroom lighting operational, light cord in reach

## 2024-09-09 NOTE — PROGRESS NOTE ADULT - PROBLEM SELECTOR PLAN 4
"    Assessment:       well child exam, new patient, seen to establish with our office      1  Health check for child over 34 days old        2  Screening for early childhood developmental handicap        3  Gross motor delay        4  Fine motor delay        5  Emotional dysregulation        6  Speech delay               Plan:          1  Anticipatory guidance: Gave handout on well-child issues at this age  Specific topics reviewed: avoid potential choking hazards (large, spherical, or coin shaped foods), car seat issues, including proper placement and transition to toddler seat at 20 pounds, child-proof home with cabinet locks, outlet plugs, window guards, and stair safety [place], discipline issues (limit-setting, positive reinforcement), importance of varied diet, Poison Control phone number [phone] and smoke detectors  2  Immunizations today: per orders      3  Follow-up visit in 6 months for next well child visit, or sooner as needed  Patient seen for well exam, he has some developmental delays, he is being evaluated for IU20, also some emotional dysregulation, discussed trying a low sensory area for when he gets upset, warning when something is going to change, let him \"cry it out\" in a safe space, mom hoping IU will help with this as well  UTD with vaccines, will see him in fall for 2 year PE, to follow up on development and will get flu vaccine    Developmental Screening:  Patient was screened for risk of developmental, behavorial, and social delays using the following standardized screening tool: Ages and Stages Questionnaire (ASQ)  Developmental screening result: Fail    Failed gross motor and personal social, watch for speech, fine motor, passed problem solving, being evaluated for IU and had speech therapy, made great strides       Subjective:     Elsi Spangler is a 2 y o  male who is here for this well child visit      Current Issues:  Can be very stubborn, tantrums, small things set him off " and then he cannot calm down, hits, throws things, seems worse if parent tries to intervene    Well Child Assessment:  History was provided by the mother  Ramon Mae lives with his mother, father and brother  Interval problems do not include caregiver depression or chronic stress at home  Nutrition  Types of intake include cereals, cow's milk, eggs, fish, fruits, meats and vegetables (eats a good variety)  Elimination  (Some interest in potty training)   Behavioral  Behavioral issues include throwing tantrums  Sleep  The patient sleeps in his own bed  Average sleep duration is 11 (occasionally will take a nap, sometimes refuses) hours  There are no sleep problems  Safety  Home is child-proofed? yes  There is no smoking in the home  Home has working smoke alarms? yes  Home has working carbon monoxide alarms? yes  There is an appropriate car seat in use  Screening  Immunizations are up-to-date  There are no risk factors for hearing loss  There are no risk factors for anemia  There are no risk factors for tuberculosis  There are no risk factors for apnea  Social  The caregiver enjoys the child  Childcare is provided at  (being evaluated for the  , was re- evaluated for speech but did not qualify)  The childcare provider is a  provider  Sibling interactions are good (most of the time)  The following portions of the patient's history were reviewed and updated as appropriate:   He  has a past medical history of History of failure to thrive syndrome (06/23/2023) and History of tibial fracture (06/23/2023)  He   Patient Active Problem List    Diagnosis Date Noted   • Gross motor delay 06/23/2023   • Fine motor delay 06/23/2023   • Emotional dysregulation 06/23/2023   • Speech delay 06/23/2023     He  has a past surgical history that includes Circumcision    His family history includes ADD / ADHD in his maternal uncle and mother; Depression in his mother; Hypertension in his maternal "grandfather and maternal grandmother; No Known Problems in his brother  He  has no history on file for tobacco use, alcohol use, and drug use  No current outpatient medications on file  No current facility-administered medications for this visit  He has No Known Allergies       Developmental 18 Months Appropriate     Question Response Comments    If ball is rolled toward child, child will roll it back (not hand it back) Yes  Yes on 6/23/2023 (Age - 1y)    Can drink from a regular cup (not one with a spout) without spilling Yes  Yes on 6/23/2023 (Age - 1y)      Developmental 24 Months Appropriate     Question Response Comments    Copies caretaker's actions, e g  while doing housework Yes  Yes on 6/23/2023 (Age - 1y)    Can put one small (< 2\") block on top of another without it falling Yes  Yes on 6/23/2023 (Age - 1y)    Appropriately uses at least 3 words other than 'flor' and 'mama' Yes  Yes on 6/23/2023 (Age - 1y)    Can take > 4 steps backwards without losing balance, e g  when pulling a toy Yes  Yes on 6/23/2023 (Age - 1y)    Can take off clothes, including pants and pullover shirts Yes  Yes on 6/23/2023 (Age - 1y)    Can walk up steps by self without holding onto the next stair Yes  Yes on 6/23/2023 (Age - 1y)    Can point to at least 1 part of body when asked, without prompting Yes  Yes on 6/23/2023 (Age - 1y)    Feeds with utensil without spilling much Yes  Yes on 6/23/2023 (Age - 1y)    Helps to  toys or carry dishes when asked Yes  Yes on 6/23/2023 (Age - 1y)    Can kick a small ball (e g  tennis ball) forward without support Yes  Yes on 6/23/2023 (Age - 1y)      Developmental 3 Years Appropriate     Question Response Comments    Child can stack 4 small (< 2\") blocks without them falling Yes  Yes on 6/23/2023 (Age - 1y)    Speaks in 2-word sentences Yes  Yes on 6/23/2023 (Age - 1y)    Can identify at least 2 of pictures of cat, bird, horse, dog, person Yes  Yes on 6/23/2023 (Age - 1y)    " "Throws ball overhand, straight, and toward someone's stomach/chest from a distance of 5 feet Yes  Yes on 6/23/2023 (Age - 1y)    Adequately follows instructions: 'put the paper on the floor; put the paper on the chair; give the paper to me' Yes  Yes on 6/23/2023 (Age - 1y)    Copies a drawing of a straight vertical line No  No on 6/23/2023 (Age - 1y)    Can jump over paper placed on floor (no running jump) Yes  Yes on 6/23/2023 (Age - 1y)    Can put on own shoes Yes  Yes on 6/23/2023 (Age - 1y)               Objective:      Growth parameters are noted and are appropriate for age  Wt Readings from Last 1 Encounters:   06/23/23 14 5 kg (32 lb) (60 %, Z= 0 26)*     * Growth percentiles are based on [facility] (Boys, 2-20 Years) data  Ht Readings from Last 1 Encounters:   06/23/23 3' 1\" (0 94 m) (51 %, Z= 0 02)*     * Growth percentiles are based on CDC (Boys, 2-20 Years) data  Body mass index is 16 43 kg/m²  Vitals:    06/23/23 1442   Pulse: 92   Resp: 22   Weight: 14 5 kg (32 lb)   Height: 3' 1\" (0 94 m)       Physical Exam  Vitals and nursing note reviewed  Constitutional:       General: He is active  Appearance: Normal appearance  He is well-developed  HENT:      Head: Normocephalic and atraumatic  Right Ear: Tympanic membrane and ear canal normal       Left Ear: Tympanic membrane and ear canal normal       Nose: Nose normal       Mouth/Throat:      Mouth: Mucous membranes are moist       Pharynx: Oropharynx is clear  Eyes:      General: Red reflex is present bilaterally  Lids are normal       Extraocular Movements: Extraocular movements intact  Conjunctiva/sclera: Conjunctivae normal    Cardiovascular:      Rate and Rhythm: Normal rate and regular rhythm  Pulses: Normal pulses  Heart sounds: S1 normal and S2 normal  No murmur heard  Pulmonary:      Effort: Pulmonary effort is normal       Breath sounds: Normal breath sounds and air entry     Abdominal:      Palpations: Abdomen " Home CPAP is soft  There is no mass  Tenderness: There is no abdominal tenderness  Comments: No hepato-splenomegaly     Genitourinary:     Penis: Normal and circumcised  Testes: Normal    Musculoskeletal:         General: Normal range of motion  Cervical back: Full passive range of motion without pain, normal range of motion and neck supple  Comments: Spine straight while standing   Lymphadenopathy:      Cervical: No cervical adenopathy  Skin:     General: Skin is warm and moist       Findings: No rash  Neurological:      General: No focal deficit present  Mental Status: He is alert

## 2024-09-09 NOTE — PROGRESS NOTE ADULT - PROBLEM SELECTOR PLAN 2
- Continue with supplemental O2  - Continue with Symbicort  - Continue with Montelukast  - Continue with Incruse Ellipta  - Continue with Roflumilast  - Also has pHTN so she is on Sildinafil

## 2024-09-09 NOTE — PROGRESS NOTE ADULT - SUBJECTIVE AND OBJECTIVE BOX
Patient is a 69y old  Female who presents with a chief complaint of Afib with RVR (09 Sep 2024 01:37)      SUBJECTIVE / OVERNIGHT EVENTS: Patient seen and examined at bedside. No acute events overnight. Feels okay now, thinks her unwellness is correlated to HR.    MEDICATIONS  (STANDING):  apixaban 5 milliGRAM(s) Oral every 12 hours  atorvastatin 10 milliGRAM(s) Oral at bedtime  brimonidine 0.2% Ophthalmic Solution 1 Drop(s) Right EYE two times a day  budesonide  80 MICROgram(s)/formoterol 4.5 MICROgram(s) Inhaler 2 Puff(s) Inhalation two times a day  dorzolamide 2% Ophthalmic Solution 1 Drop(s) Right EYE <User Schedule>  Incruse Ellipta 62.5mcg 1 Inhalation 1 Inhalation Oral daily  ketorolac 0.5% Ophthalmic Solution 1 Drop(s) Right EYE four times a day  lidocaine   4% Patch 1 Patch Transdermal daily  montelukast 10 milliGRAM(s) Oral at bedtime  ofloxacin 0.3% Solution 1 Drop(s) Right EYE four times a day  prednisoLONE acetate 1% Suspension 1 Drop(s) Right EYE four times a day  roflumilast 500 MICROGram(s) Oral daily  sildenafil (REVATIO) 40 milliGRAM(s) Oral three times a day    MEDICATIONS  (PRN):  acetaminophen     Tablet .. 650 milliGRAM(s) Oral every 6 hours PRN Temp greater or equal to 38C (100.4F), Mild Pain (1 - 3)  aluminum hydroxide/magnesium hydroxide/simethicone Suspension 30 milliLiter(s) Oral every 4 hours PRN Dyspepsia  melatonin 3 milliGRAM(s) Oral at bedtime PRN Insomnia  ondansetron Injectable 4 milliGRAM(s) IV Push every 8 hours PRN Nausea and/or Vomiting      CAPILLARY BLOOD GLUCOSE        I&O's Summary    09 Sep 2024 07:01  -  09 Sep 2024 14:44  --------------------------------------------------------  IN: 600 mL / OUT: 0 mL / NET: 600 mL        PHYSICAL EXAM:  Vital Signs Last 24 Hrs  T(C): 36.7 (09 Sep 2024 11:06), Max: 37.2 (08 Sep 2024 22:27)  T(F): 98.1 (09 Sep 2024 11:06), Max: 98.9 (08 Sep 2024 22:27)  HR: 98 (09 Sep 2024 13:57) (90 - 118)  BP: 135/83 (09 Sep 2024 13:57) (103/75 - 135/83)  BP(mean): --  RR: 22 (09 Sep 2024 11:06) (17 - 22)  SpO2: 92% (09 Sep 2024 11:06) (91% - 94%)    Parameters below as of 09 Sep 2024 11:06  Patient On (Oxygen Delivery Method): room air        GEN: female in NAD, appears comfortable, no diaphoresis  EYES: No scleral injection, EOMI  ENTM: neck supple & symmetric without tracheal deviation, moist membranes, no gross hearing impairment, thyroid gland not enlarged  CV: +S1/S2, no m/r/g, no abdominal bruit, no LE edema  RESP: breathing comfortably, no respiratory accessory muscle use, CTAB, no w/r/r  GI: normoactive BS, soft, NTND, no rebounding/guarding, no palpable masses    LABS:                        13.0   6.83  )-----------( 226      ( 09 Sep 2024 07:50 )             38.5     09-09    143  |  114<H>  |  7   ----------------------------<  98  3.8   |  25  |  0.63    Ca    8.7      09 Sep 2024 07:50    TPro  7.3  /  Alb  3.5  /  TBili  0.8  /  DBili  x   /  AST  16  /  ALT  18  /  AlkPhos  51  09-09          Urinalysis Basic - ( 09 Sep 2024 07:50 )    Color: x / Appearance: x / SG: x / pH: x  Gluc: 98 mg/dL / Ketone: x  / Bili: x / Urobili: x   Blood: x / Protein: x / Nitrite: x   Leuk Esterase: x / RBC: x / WBC x   Sq Epi: x / Non Sq Epi: x / Bacteria: x          RADIOLOGY & ADDITIONAL TESTS:  Results Reviewed:   Imaging Personally Reviewed:  Electrocardiogram Personally Reviewed:    COORDINATION OF CARE:  Care Discussed with Consultants/Other Providers [Y/N]:  Prior or Outpatient Records Reviewed [Y/N]:

## 2024-09-09 NOTE — H&P ADULT - NSICDXPASTSURGICALHX_GEN_ALL_CORE_FT
PAST SURGICAL HISTORY:  H/O cardiac catheterization in 2012 in [facility]- denies intervention

## 2024-09-09 NOTE — H&P ADULT - NSHPLABSRESULTS_GEN_ALL_CORE
12.7   7.44  )-----------( 213      ( 08 Sep 2024 19:54 )             37.0     142  |  108  |  9   ----------------------------<  97       3.5   |  26  |  0.70    Ca    9.1      08 Sep 2024 19:54    TPro  7.5  /  Alb  3.6  /  TBili  0.6  /  DBili  x   /  AST  13<L>  /  ALT  18  /  AlkPhos  57          Urinalysis Basic - ( 08 Sep 2024 19:54 )  Color: Yellow / Appearance: Clear / S.015 / pH: 6.0  Gluc: Negative mg/dL / Ketone: Negative mg/dL  / Bili: Negative / Urobili: 1.0 mg/dL   Blood: Trace / Protein: Trace mg/dL / Nitrite: Negative   Leuk Esterase: Moderate / RBC: 5 /HPF / WBC 6 /HPF   Sq Epi: x / Bacteria: Occasional /HPF  Hyaline Casts: x/WBC Casts: x

## 2024-09-10 ENCOUNTER — TRANSCRIPTION ENCOUNTER (OUTPATIENT)
Age: 69
End: 2024-09-10

## 2024-09-10 LAB
ANION GAP SERPL CALC-SCNC: 8 MMOL/L — SIGNIFICANT CHANGE UP (ref 5–17)
BUN SERPL-MCNC: 12 MG/DL — SIGNIFICANT CHANGE UP (ref 7–23)
CALCIUM SERPL-MCNC: 8.7 MG/DL — SIGNIFICANT CHANGE UP (ref 8.5–10.1)
CHLORIDE SERPL-SCNC: 112 MMOL/L — HIGH (ref 96–108)
CO2 SERPL-SCNC: 23 MMOL/L — SIGNIFICANT CHANGE UP (ref 22–31)
CREAT SERPL-MCNC: 0.64 MG/DL — SIGNIFICANT CHANGE UP (ref 0.5–1.3)
EGFR: 95 ML/MIN/1.73M2 — SIGNIFICANT CHANGE UP
GLUCOSE SERPL-MCNC: 82 MG/DL — SIGNIFICANT CHANGE UP (ref 70–99)
HCT VFR BLD CALC: 37.4 % — SIGNIFICANT CHANGE UP (ref 34.5–45)
HGB BLD-MCNC: 12.5 G/DL — SIGNIFICANT CHANGE UP (ref 11.5–15.5)
MAGNESIUM SERPL-MCNC: 2.1 MG/DL — SIGNIFICANT CHANGE UP (ref 1.6–2.6)
MCHC RBC-ENTMCNC: 31.3 PG — SIGNIFICANT CHANGE UP (ref 27–34)
MCHC RBC-ENTMCNC: 33.4 G/DL — SIGNIFICANT CHANGE UP (ref 32–36)
MCV RBC AUTO: 93.5 FL — SIGNIFICANT CHANGE UP (ref 80–100)
NRBC # BLD: 0 /100 WBCS — SIGNIFICANT CHANGE UP (ref 0–0)
PHOSPHATE SERPL-MCNC: 4.2 MG/DL — SIGNIFICANT CHANGE UP (ref 2.5–4.5)
PLATELET # BLD AUTO: 203 K/UL — SIGNIFICANT CHANGE UP (ref 150–400)
POTASSIUM SERPL-MCNC: 4 MMOL/L — SIGNIFICANT CHANGE UP (ref 3.5–5.3)
POTASSIUM SERPL-SCNC: 4 MMOL/L — SIGNIFICANT CHANGE UP (ref 3.5–5.3)
RBC # BLD: 4 M/UL — SIGNIFICANT CHANGE UP (ref 3.8–5.2)
RBC # FLD: 12.7 % — SIGNIFICANT CHANGE UP (ref 10.3–14.5)
SODIUM SERPL-SCNC: 143 MMOL/L — SIGNIFICANT CHANGE UP (ref 135–145)
WBC # BLD: 4.83 K/UL — SIGNIFICANT CHANGE UP (ref 3.8–10.5)
WBC # FLD AUTO: 4.83 K/UL — SIGNIFICANT CHANGE UP (ref 3.8–10.5)

## 2024-09-10 PROCEDURE — 99239 HOSP IP/OBS DSCHRG MGMT >30: CPT

## 2024-09-10 RX ORDER — SILDENAFIL 25 MG/1
2 TABLET, FILM COATED ORAL
Qty: 0 | Refills: 0 | DISCHARGE
Start: 2024-09-10

## 2024-09-10 RX ORDER — PROPAFENONE HCL 225 MG
1 CAPSULE, EXTENDED RELEASE 12 HR ORAL
Refills: 0 | DISCHARGE

## 2024-09-10 RX ORDER — FOLIC ACID/MULTIVIT,IRON,MINER 0.4MG-18MG
1 TABLET,CHEWABLE ORAL
Qty: 90 | Refills: 1
Start: 2024-09-10 | End: 2025-03-08

## 2024-09-10 RX ORDER — ROFLUMILAST 500 UG/1
500 TABLET ORAL
Refills: 0 | Status: DISCONTINUED | OUTPATIENT
Start: 2024-09-10 | End: 2024-09-10

## 2024-09-10 RX ORDER — DILTIAZEM HYDROCHLORIDE 5 MG/ML
1 INJECTION INTRAVENOUS
Qty: 0 | Refills: 0 | DISCHARGE
Start: 2024-09-10

## 2024-09-10 RX ORDER — ROFLUMILAST 500 UG/1
1 TABLET ORAL
Qty: 0 | Refills: 0 | DISCHARGE
Start: 2024-09-10

## 2024-09-10 RX ADMIN — Medication 1 DROP(S): at 05:46

## 2024-09-10 RX ADMIN — BUDESONIDE AND FORMOTEROL FUMARATE 2 PUFF(S): 80; 4.5 AEROSOL, METERED RESPIRATORY (INHALATION) at 05:55

## 2024-09-10 RX ADMIN — APIXABAN 5 MILLIGRAM(S): 5 TABLET, FILM COATED ORAL at 05:43

## 2024-09-10 RX ADMIN — SILDENAFIL 40 MILLIGRAM(S): 25 TABLET, FILM COATED ORAL at 13:44

## 2024-09-10 RX ADMIN — OFLOXACIN 1 DROP(S): 3 SOLUTION/ DROPS OPHTHALMIC at 05:47

## 2024-09-10 RX ADMIN — DILTIAZEM HYDROCHLORIDE 240 MILLIGRAM(S): 5 INJECTION INTRAVENOUS at 05:41

## 2024-09-10 RX ADMIN — Medication 1 DROP(S): at 11:06

## 2024-09-10 RX ADMIN — Medication 1 DROP(S): at 05:47

## 2024-09-10 RX ADMIN — Medication 1 PATCH: at 02:40

## 2024-09-10 RX ADMIN — Medication 1 DROP(S): at 00:38

## 2024-09-10 RX ADMIN — ROFLUMILAST 500 MICROGRAM(S): 500 TABLET ORAL at 06:39

## 2024-09-10 RX ADMIN — Medication 1 DROP(S): at 08:04

## 2024-09-10 RX ADMIN — Medication 1 DROP(S): at 00:39

## 2024-09-10 RX ADMIN — OFLOXACIN 1 DROP(S): 3 SOLUTION/ DROPS OPHTHALMIC at 00:38

## 2024-09-10 RX ADMIN — SILDENAFIL 40 MILLIGRAM(S): 25 TABLET, FILM COATED ORAL at 05:41

## 2024-09-10 RX ADMIN — OFLOXACIN 1 DROP(S): 3 SOLUTION/ DROPS OPHTHALMIC at 11:07

## 2024-09-10 RX ADMIN — Medication 1 PATCH: at 11:05

## 2024-09-10 RX ADMIN — BRIMONIDINE TARTRATE 1 DROP(S): 2 SOLUTION/ DROPS OPHTHALMIC at 05:48

## 2024-09-10 NOTE — CONSULT NOTE ADULT - ASSESSMENT
69 F COPD AF RVR MARIE possible COPD exacerbation/antibiotic  agree diltiazem 240/d apixaban 5 BID  CHADVASC 3 (HTN, gender1, age2)  TTE pending

## 2024-09-10 NOTE — DISCHARGE NOTE PROVIDER - NSDCMRMEDTOKEN_GEN_ALL_CORE_FT
albuterol 0.63 mg/3 mL (0.021%) inhalation solution: 3 milliliter(s) by nebulizer 3 times a day   baclofen 10 mg oral tablet: 1 tab(s) orally once a day (at bedtime)  Breyna 160 mcg-4.5 mcg/inh inhalation aerosol: 2 puff(s) inhaled 2 times a day  dilTIAZem 240 mg/24 hours oral capsule, extended release: 1 cap(s) orally once a day  Eliquis 5 mg oral tablet: 1 tab(s) orally 2 times a day  Incruse Ellipta 62.5 mcg/inh inhalation powder: 1 puff(s) inhaled once a day  ketorolac 0.5% ophthalmic solution: 1 drop(s) in each affected eye 4 times a day  montelukast 10 mg oral tablet: 1 tab(s) orally once a day (in the evening)  ofloxacin 0.3% ophthalmic solution: 1 drop(s) in each affected eye 4 times a day Rt eye  prednisoLONE acetate 1% ophthalmic suspension: 1 drop(s) in each affected eye 4 times a day Rt eye  roflumilast 500 mcg oral tablet: 1 tab(s) orally once a day  sildenafil 20 mg oral tablet: 2 tab(s) orally 3 times a day  Simbrinza 1%- 0.2% ophthalmic suspension: 1 drop(s) in each affected eye 2 times a day Rt eye  simvastatin 20 mg oral tablet: 1 tab(s) orally once a day (at bedtime)  Vitamin D3 1000 intl units oral tablet: 1 tab(s) orally once a day

## 2024-09-10 NOTE — CONSULT NOTE ADULT - SUBJECTIVE AND OBJECTIVE BOX
Reason for Admission: Afib with RVR  History of Present Illness:   69-year-old female with a PMH of HTN, HLD, Afib, COPD (home 02 PRN) presents to the ED for acute onset of fatigue and shortness of breath started in the afternoon. Endorses in the morning when she woke up, she was well then as the day progresses, she started to fell unwell, used home O2 with minimal relief which prompted ED visit. In the ED patient was found to be in Afib with RVR. Endorses she has been complaint with her medication. Denies chest pain, palpitation, dizziness, headache, N/V/D/C, dysuria, or any other acute symptoms. Labs unremarkable.  Normotensive. HR:118. Afebrile. UA: Moderate Leuk-esterase, 6-WBC. Received Ceftriaxone. 1L-NS, Diltiazem 10mg IVHome Medications:   * Incomplete Medication History as of 09-Sep-2024 01:49 documented in Structured Notes  · 	dilTIAZem 120 mg/24 hours oral capsule, extended release: Last Dose Taken:  , 1 cap(s) orally once a day  · 	albuterol 0.63 mg/3 mL (0.021%) inhalation solution: Last Dose Taken:  , 3 milliliter(s) by nebulizer 3 times a day   · 	Eliquis 5 mg oral tablet: Last Dose Taken:  , 1 tab(s) orally 2 times a day  · 	montelukast 10 mg oral tablet: Last Dose Taken:  , 1 tab(s) orally once a day (in the evening)  · 	Vitamin D3 1000 intl units oral tablet: Last Dose Taken:  , 1 tab(s) orally once a day  · 	baclofen 10 mg oral tablet: Last Dose Taken:  , 1 tab(s) orally once a day (at bedtime)  · 	simvastatin 20 mg oral tablet: Last Dose Taken:  , 1 tab(s) orally once a day (at bedtime)  · 	propafenone 150 mg oral tablet: 1 tab(s) orally 3 times a day  · 	Breo Ellipta 200 mcg-25 mcg/inh inhalation powder: Last Dose Taken:  , 1 puff(s) inhaled once a dayPAST MEDICAL HISTORY:  Asthma     Atrial fibrillation     Chronic sinusitis     COPD (chronic obstructive pulmonary disease)     Essential hypertension     GERD (gastroesophageal reflux disease)     Hypercholesterolemia     Sleep apnea. Physical Exam:   Physical Exam: GENERAL: NAD, comfortable in bed  HEAD:  Atraumatic, Normocephalic  EYES: EOMI, PERRL, conjunctiva and sclera clear  NECK: Supple, No JVD  LUNG: Clear to auscultation bilaterally  HEART: Irregularly irregular. No murmurs, rubs, or gallops  ABDOMEN: Soft, Nontender, Nondistended; Normal Bowel sounds   PSYCH: normal mood and affect  NEUROLOGY: AAOx3, non-focal   SKIN: No rashes or lesions  Labs, Radiology, Cardiology, and Other Results: 12.7   7.44  )-----------( 213      ( 08 Sep 2024 19:54 )             37.0     142  |  108  |  9   ----------------------------<  97     09  3.5   |  26  |  0.70    Ca    9.1      08 Sep 2024 19:54    TPro  7.5  /  Alb  3.6  /  TBili  0.6  /  DBili  x   /  AST  13<L>  /  ALT  18  /  AlkPhos  57          Urinalysis Basic - ( 08 Sep 2024 19:54 )  Color: Yellow / Appearance: Clear / S.015 / pH: 6.0  Gluc: Negative mg/dL / Ketone: Negative mg/dL  / Bili: Negative / Urobili: 1.0 mg/dL   Blood: Trace / Protein: Trace mg/dL / Nitrite: Negative   Leuk Esterase: Moderate / RBC: 5 /HPF / WBC 6 /HPF   Sq Epi: x / Bacteria: Occasional /HPF  Hyaline Casts: x/WBC Casts: x

## 2024-09-10 NOTE — DISCHARGE NOTE PROVIDER - CARE PROVIDER_API CALL
Charly Vasquez  Cardiology  [address] [place] [address]. [address]  [place] [address]  Phone: [phone]  Fax: [phone]  Established Patient  Follow Up Time: 1-3 days

## 2024-09-10 NOTE — DISCHARGE NOTE PROVIDER - ATTENDING DISCHARGE PHYSICAL EXAMINATION:
T(C): 36.9 (09-10-24 @ 04:49), Max: 36.9 (09-10-24 @ 04:49)  HR: 70 (09-10-24 @ 07:01) (70 - 100)  BP: 106/69 (09-10-24 @ 04:49) (101/65 - 135/83)  RR: 20 (09-10-24 @ 04:49) (18 - 20)  SpO2: 93% (09-10-24 @ 04:49) (93% - 94%)    GEN: female in NAD, appears comfortable, no diaphoresis  EYES: No scleral injection, PERRL, EOMI  ENTM: neck supple & symmetric without tracheal deviation, moist membranes, no gross hearing impairment, thyroid gland not enlarged  CV: +S1/S2, no m/r/g, no abdominal bruit, no LE edema  RESP: breathing comfortably, no respiratory accessory muscle use, CTAB, no w/r/r  GI: normoactive BS, soft, NTND, no rebounding/guarding, no palpable masses  LYMPHATICS: no LAD or tenderness to palpation  NEURO: AOx3, no focal deficits, CNII-XII grossly intact  PSYCH: No SI/HI/AVH, appropriate affect, appropriate insight/judgment   SKIN: no petechiae, ecchymosis or maculopapular rash noted

## 2024-09-10 NOTE — DISCHARGE NOTE PROVIDER - NSDCPNSUBOBJ_GEN_ALL_CORE
Patient seen and examined at bedside. No acute events overnight. No chest pain or palpitations. Feeling at baseline. Wants to go home, instructed to closely follow up with her cardiologist as she is pending an outpatient ablation in 1-2 weeks.

## 2024-09-10 NOTE — DISCHARGE NOTE PROVIDER - NSDCCPCAREPLAN_GEN_ALL_CORE_FT
PRINCIPAL DISCHARGE DIAGNOSIS  Diagnosis: Atrial fibrillation with RVR  Assessment and Plan of Treatment: Please continue with your home medications and follow up closely with your cardiologist for possible ablation. If you feel your HR is running fast again would call your cardiologist or other medical professional for instructions on what to do.      SECONDARY DISCHARGE DIAGNOSES  Diagnosis: History of COPD  Assessment and Plan of Treatment: Continue with home medications    Diagnosis: Acute UTI  Assessment and Plan of Treatment:

## 2024-09-10 NOTE — DISCHARGE NOTE PROVIDER - HOSPITAL COURSE
HPI:  69-year-old female with a PMH of HTN, HLD, Afib, COPD (home 02 PRN) presents to the ED for acute onset of fatigue and shortness of breath started in the afternoon. Endorses in the morning when she woke up, she was well then as the day progresses, she started to fell unwell, used home O2 with minimal relief which prompted ED visit. In the ED patient was found to be in Afib with RVR. Endorses she has been complaint with her medication. Denies chest pain, palpitation, dizziness, headache, N/V/D/C, dysuria, or any other acute symptoms. Labs unremarkable.  Normotensive. HR:118. Afebrile. UA: Moderate Leuk-esterase, 6-WBC. Received Ceftriaxone. 1L-NS, Diltiazem 10mg IV.  (09 Sep 2024 01:37)    Hospital Course:  69F with PMHx of HTN, chronic atrial fibrillation, GEO (on CPAP) COPD (with home O2), and pulmonary hypertension presenting for SOB and fatigue. Patient found to have atrial fibrillation with RVR which correlates with her symptoms. Patient admitted for further management of her atrial fibrillation. Patient resumed on her home calcium channel blocker with controlled rates. Patient to follow up closely with her outpatient cardiologist who had referred her to an electrophysiologist for ablation.

## 2024-09-10 NOTE — DISCHARGE NOTE PROVIDER - NSDCFUSCHEDAPPT_GEN_ALL_CORE_FT
Larry Epps  [facility]  [address]  Scheduled Appointment: 10/17/2024

## 2024-09-10 NOTE — DISCHARGE NOTE NURSING/CASE MANAGEMENT/SOCIAL WORK - PATIENT PORTAL LINK FT
You can access the FollowMyHealth Patient Portal offered by [facility] by registering at the following website: [email or web address]. By joining [employer]’s FollowMyHealth portal, you will also be able to view your health information using other applications (apps) compatible with our system.

## 2024-09-10 NOTE — DISCHARGE NOTE NURSING/CASE MANAGEMENT/SOCIAL WORK - NSDCPEELIQUIS_GEN_ALL_CORE
Sports Medicine New Consult Note    Referring Provider: *** Carmine Castelan DO     No chief complaint on file.      HPI: Dawson is a 43 year old male presenting today evaluation of right elbow pain.          Review of Systems    Past Medical History:   Diagnosis Date    Alcohol dependence in remission (CMD)     Bleeding external hemorrhoids 10/29/2013    Generalized anxiety disorder        Past Surgical History:   Procedure Laterality Date    Colon surgery      diverticulitis 2 surgeries, ruptured    Undescended testicle exploration         Family History   Problem Relation Age of Onset    Hypertension Mother     Hypertension Father    ,   No immediate family members with RA, SLE, gout    ALLERGIES:   Allergen Reactions    Ciprofloxacin SHORTNESS OF BREATH       Current Outpatient Medications   Medication Sig Dispense Refill    pramipexole (MIRAPEX) 0.125 MG tablet TAKE 1 TABLET BY MOUTH EVERY NIGHT AT BEDTIME AS NEEDED FOR RESTLESS LEG SYNDROMES AS DIRECTED 90 tablet 1    naproxen (NAPROSYN) 500 MG tablet Take 1 tablet by mouth in the morning and 1 tablet in the evening. 28 tablet 0    tiZANidine (ZANAFLEX) 4 MG tablet Take 1 tablet by mouth 3 times daily as needed (back pain). 30 tablet 2    DULoxetine (CYMBALTA) 60 MG capsule TAKE 1 CAPSULE BY MOUTH DAILY 90 capsule 3    amLODIPine (NORVASC) 10 MG tablet Take 1 tablet by mouth daily. As directed. 90 tablet 3    hydroCHLOROthiazide (HYDRODIURIL) 25 MG tablet Take 1 tablet by mouth daily. 90 tablet 3    losartan (COZAAR) 100 MG tablet Take 1 tablet by mouth daily. 90 tablet 3    hydrOXYzine (ATARAX) 25 MG tablet Take 2 tablets by mouth 3 times daily as needed for Anxiety. 90 tablet 5    traZODone (DESYREL) 50 MG tablet Take 1 tablet by mouth nightly. 30 tablet 5    Cequa 0.09 % Solution Place 2 drops into both eyes daily.      mupirocin (BACTROBAN) 2 % ointment Apply 1 application topically 3 times daily. 22 g 0    albuterol (Ventolin HFA) 108 (90 Base) MCG/ACT  inhaler Inhale 2 puffs into the lungs every 4 hours as needed for Shortness of Breath or Wheezing. INHALE 2 PUFFS BY MOUTH FOUR TIMES DAILY AS NEEDED 1 each 11     No current facility-administered medications for this visit.       Social History     Socioeconomic History    Marital status: Single     Spouse name: Not on file    Number of children: Not on file    Years of education: Not on file    Highest education level: Not on file   Occupational History    Occupation: [employer] - [employer]   Tobacco Use    Smoking status: Former    Smokeless tobacco: Never   Vaping Use    Vaping Use: never used   Substance and Sexual Activity    Alcohol use: Not Currently    Drug use: Never    Sexual activity: Yes     Partners: Male   Other Topics Concern    Not on file   Social History Narrative    Not on file     Social Determinants of Health     Financial Resource Strain: Not on file   Food Insecurity: Not on file   Transportation Needs: Not on file   Physical Activity: Sufficiently Active (4/21/2021)    Exercise Vital Sign     Days of Exercise per Week: 5 days     Minutes of Exercise per Session: 120 min   Stress: Low Risk  (4/21/2021)    Stress     How Stressed: Not at all   Social Connections: Not on file   Interpersonal Safety: Not on file (1/30/2021)       Physical Exam    Ortho Exam      No diagnosis found.  No problem-specific Assessment & Plan notes found for this encounter.      Assessment & Plan:   ***    Kat Bell, ATC    This note was created using the Dragon voice recognition system. Errors in content may be related to improper recognition of the system. Effort to review and correct the note has been made but irregularities may still be present.       Apixaban/Eliquis - Compliance/Apixaban/Eliquis - Dietary Advice/Apixaban/Eliquis - Follow up monitoring/Apixaban/Eliquis - Potential for adverse drug reactions and interactions

## 2024-09-11 VITALS
SYSTOLIC BLOOD PRESSURE: 160 MMHG | DIASTOLIC BLOOD PRESSURE: 70 MMHG | TEMPERATURE: 98 F | OXYGEN SATURATION: 97 % | RESPIRATION RATE: 18 BRPM | HEART RATE: 76 BPM

## 2024-09-17 DIAGNOSIS — J44.9 CHRONIC OBSTRUCTIVE PULMONARY DISEASE, UNSPECIFIED: ICD-10-CM

## 2024-09-17 DIAGNOSIS — G47.30 SLEEP APNEA, UNSPECIFIED: ICD-10-CM

## 2024-09-17 DIAGNOSIS — I48.20 CHRONIC ATRIAL FIBRILLATION, UNSPECIFIED: ICD-10-CM

## 2024-09-17 DIAGNOSIS — G47.33 OBSTRUCTIVE SLEEP APNEA (ADULT) (PEDIATRIC): ICD-10-CM

## 2024-09-17 DIAGNOSIS — E78.5 HYPERLIPIDEMIA, UNSPECIFIED: ICD-10-CM

## 2024-09-17 DIAGNOSIS — E78.00 PURE HYPERCHOLESTEROLEMIA, UNSPECIFIED: ICD-10-CM

## 2024-09-17 DIAGNOSIS — N39.0 URINARY TRACT INFECTION, SITE NOT SPECIFIED: ICD-10-CM

## 2024-09-17 DIAGNOSIS — K21.9 GASTRO-ESOPHAGEAL REFLUX DISEASE WITHOUT ESOPHAGITIS: ICD-10-CM

## 2024-09-17 DIAGNOSIS — I27.20 PULMONARY HYPERTENSION, UNSPECIFIED: ICD-10-CM

## 2024-09-17 DIAGNOSIS — I10 ESSENTIAL (PRIMARY) HYPERTENSION: ICD-10-CM

## 2024-09-17 DIAGNOSIS — R53.83 OTHER FATIGUE: ICD-10-CM

## 2024-09-24 ENCOUNTER — NON-APPOINTMENT (OUTPATIENT)
Age: 69
End: 2024-09-24

## 2024-09-24 ENCOUNTER — RX CHANGE (OUTPATIENT)
Age: 69
End: 2024-09-24

## 2024-10-02 NOTE — ED PROVIDER NOTE - PATIENT PORTAL LINK FT
Adult
You can access the FollowMyHealth Patient Portal offered by [facility] by registering at the following website: [email or web address]. By joining [employer]’s FollowMyHealth portal, you will also be able to view your health information using other applications (apps) compatible with our system.

## 2024-10-09 ENCOUNTER — NON-APPOINTMENT (OUTPATIENT)
Age: 69
End: 2024-10-09

## 2024-10-15 ENCOUNTER — RX RENEWAL (OUTPATIENT)
Age: 69
End: 2024-10-15

## 2024-10-17 ENCOUNTER — NON-APPOINTMENT (OUTPATIENT)
Age: 69
End: 2024-10-17

## 2024-10-17 ENCOUNTER — APPOINTMENT (OUTPATIENT)
Dept: PULMONOLOGY | Facility: CLINIC | Age: 69
End: 2024-10-17
Payer: MEDICARE

## 2024-10-17 VITALS
TEMPERATURE: 208.22 F | SYSTOLIC BLOOD PRESSURE: 126 MMHG | RESPIRATION RATE: 13 BRPM | BODY MASS INDEX: 25.23 KG/M2 | DIASTOLIC BLOOD PRESSURE: 77 MMHG | HEIGHT: 66 IN | WEIGHT: 157 LBS | OXYGEN SATURATION: 87 % | HEART RATE: 99 BPM

## 2024-10-17 DIAGNOSIS — Z87.891 PERSONAL HISTORY OF NICOTINE DEPENDENCE: ICD-10-CM

## 2024-10-17 PROCEDURE — G2211 COMPLEX E/M VISIT ADD ON: CPT

## 2024-10-17 PROCEDURE — 99215 OFFICE O/P EST HI 40 MIN: CPT

## 2024-10-18 NOTE — ED ADULT NURSE NOTE - CINV DISCH TEACH PARTICIP
"Time reflects when diagnosis was documented in both MDM as applicable and the Disposition within this note       Time User Action Codes Description Comment    10/18/2024 11:02 AM Sharon Law Add [F90.9] ADHD     10/18/2024 11:38 AM Sharon Law Add [F84.0] Autism           ED Disposition       ED Disposition   Discharge    Condition   Stable    Date/Time   Fri Oct 18, 2024 11:02 AM    Comment   Rik Marin discharge to home/self care.                   Assessment & Plan       Medical Decision Making  Consult our crisis worker    Patient evaluated by our crisis worker.  At this time patient is not suicidal or homicidal.  Patient was set up with [facility] day program where he will start today.  At this time patient is discharged home with follow-up to outpatient day program as well as PCP and psychiatrist.  Close return instructions given to return to the ER for any worsening symptoms.  Patient agrees with discharge plan.  Patient well appearing at time of discharge.    Please Note: Fluency Direct voice recognition software may have been used in the creation of this document. Wrong words or sound a like substitutions may have occurred due to the inherent limitations of the voice software.             ED Course as of 10/18/24 1141   Fri Oct 18, 2024   1137 Patient evaluated by our crisis worker.  At this time patient is not suicidal or homicidal.  Patient was set up with [facility] day program where he will start today.       Medications - No data to display    ED Risk Strat Scores             CRAFFT      Flowsheet Row Most Recent Value   CRAFFT Initial Screen: During the past 12 months, did you:    1. Drink any alcohol (more than a few sips)?  No Filed at: 10/18/2024 0940   2. Smoke any marijuana or hashish No Filed at: 10/18/2024 0940   3. Use anything else to get high? (\"anything else\" includes illegal drugs, over the counter and prescription drugs, and things that you sniff or 'patel')? No " Filed at: 10/18/2024 0940                                          History of Present Illness       Chief Complaint   Patient presents with    Psychiatric Evaluation     Pt. States woke up with headache and felt a lot of anger and decidied he needed to come in. He gets more angry when he has to talk about it. Denies wanting to hurt himself at this time. But has in last month. 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    Gram-negative bacteremia 05/13/2024    Lung collapse     Sepsis ([facility]) 05/06/2024    Sleep apnea     Viral meningitis       Past Surgical History:   Procedure Laterality Date    INCISION AND DRAINAGE OF WOUND Left 5/13/2024    Procedure: INCISION AND DRAINAGE (I&D) GROIN;  Surgeon: Tim Ch MD;  Location: [facility];  Service: General    INCISION AND DRAINAGE OF WOUND Left 5/15/2024    Procedure: INCISION AND DRAINAGE (I&D) GROIN;  Surgeon: Tim Ch MD;  Location: [facility];  Service: General    IN I&D VULVA/PERINEAL ABSCESS N/A 5/11/2024    Procedure: INCISION AND DRAINAGE (I&D) PERINEAL ABSCESS;  Surgeon: Tim Ch MD;  Location: [facility];  Service: General    TONSILECTOMY AND ADNOIDECTOMY      2020      Family History   Problem Relation Age of Onset    Hyperlipidemia Mother     Diabetes type II Mother     Obesity Mother     Obesity Father     Drug abuse Father     Diabetes type II Maternal Aunt     Lung disease Maternal Aunt     Rheum arthritis Maternal Aunt     Fibromyalgia Maternal Aunt     Atrial fibrillation Maternal Grandmother     Hyperlipidemia Maternal Grandfather     Diabetes type II Maternal Grandfather     Stroke Maternal Grandfather     Heart disease Maternal Grandfather     Stroke Paternal Grandfather       Social History     Tobacco Use    Smoking status: Every Day     Types: Cigarettes      Passive exposure: Yes    Smokeless tobacco: Current   Vaping Use    Vaping status: Some Days    Substances: Nicotine, Flavoring   Substance Use Topics    Alcohol use: Yes     Alcohol/week: 2.0 standard drinks of alcohol     Types: 2 Shots of liquor per week     Comment: when available to him    Drug use: Yes     Types: Marijuana      E-Cigarette/Vaping    E-Cigarette Use Current Some Day User     Comments smoke cigg. when he has money       E-Cigarette/Vaping Substances    Nicotine Yes     THC No     CBD No     Flavoring Yes     Other No     Unknown No       I have reviewed and agree with the history as documented.     18-year-old male with past history of morbid obesity, ADHD, suicidal ideations, viral meningitis, diabetes, sleep apnea, Humberto's gangrene, presents to the ED for evaluation of anger issues and thoughts of hurting himself.  Patient states that he has not been taking any of his psychiatric medications at home.  Patient woke up this morning and felt very angry.  Patient then felt like he was going to hurt himself.  Patient called family guidance to speak to a crisis worker however then he hung up the phone.  Family guidance crisis worker called police who went to check on patient and brought patient to the ED for further evaluation.  In the emergency department patient feels like he is going to have a anger outburst however he is not suicidal.  He has no plans on hurting himself or others.          Review of Systems   Constitutional:  Negative for chills and fever.   HENT:  Negative for ear pain and sore throat.    Eyes:  Negative for pain and visual disturbance.   Respiratory:  Negative for cough and shortness of breath.    Cardiovascular:  Negative for chest pain and palpitations.   Gastrointestinal:  Negative for abdominal pain and vomiting.   Genitourinary:  Negative for dysuria and hematuria.   Musculoskeletal:  Negative for arthralgias and back pain.   Skin:  Negative for color change and rash.    Neurological:  Negative for seizures and syncope.   All other systems reviewed and are negative.          Objective       ED Triage Vitals [10/18/24 0935]   Temperature Pulse Blood Pressure Respirations SpO2 Patient Position - Orthostatic VS   97.7 °F (36.5 °C) 92 126/72 20 98 % Sitting      Temp Source Heart Rate Source BP Location FiO2 (%) Pain Score    Oral Monitor Left arm -- --      Vitals      Date and Time Temp Pulse SpO2 Resp BP Pain Score FACES Pain Rating User   10/18/24 0935 97.7 °F (36.5 °C) 92 98 % 20 126/72 -- -- VG            Physical Exam  Vitals and nursing note reviewed.   Constitutional:       General: He is not in acute distress.     Appearance: He is well-developed.   HENT:      Head: Normocephalic and atraumatic.   Eyes:      Conjunctiva/sclera: Conjunctivae normal.   Cardiovascular:      Rate and Rhythm: Normal rate and regular rhythm.      Heart sounds: No murmur heard.  Pulmonary:      Effort: Pulmonary effort is normal. No respiratory distress.      Breath sounds: Normal breath sounds.   Abdominal:      Palpations: Abdomen is soft.      Tenderness: There is no abdominal tenderness.   Musculoskeletal:         General: No swelling.      Cervical back: Neck supple.   Skin:     General: Skin is warm and dry.      Capillary Refill: Capillary refill takes less than 2 seconds.   Neurological:      Mental Status: He is alert.   Psychiatric:         Mood and Affect: Mood normal.         Results Reviewed       None            No orders to display       Procedures    ED Medication and Procedure Management   Prior to Admission Medications   Prescriptions Last Dose Informant Patient Reported? Taking?   Abilify Maintena 400 MG injection   Yes No   Sig: INJECT 2 ML INTRAMUSCUALRLY EVERY 4 WEEKS AS DIRECTED   Alcohol Swabs 70 % PADS   No No   Sig: May substitute brand based on insurance coverage. Check glucose TID.   Blood Glucose Monitoring Suppl (OneTouch Verio Reflect) w/Device KIT   No No   Sig: May  substitute brand based on insurance coverage. Check glucose TID.   Blood Glucose Monitoring Suppl (OneTouch Verio) w/Device KIT   No No   Sig: Use 2 (two) times a day Ok to substitute with insurance covered brand   FLUoxetine (PROzac) 10 mg capsule   No No   Sig: Take 1 capsule (10 mg total) by mouth daily   Insulin Glargine Solostar (Lantus SoloStar) 100 UNIT/ML SOPN   No No   Sig: Inject 0.3 mL (30 Units total) under the skin daily at bedtime   Insulin Pen Needle (BD Pen Needle Shani 2nd Gen) 32G X 4 MM MISC   No No   Sig: For use with insulin pen. Pharmacy may dispense brand covered by insurance.   Lancets (onetouch ultrasoft) lancets   No No   Sig: Use as instructed   OneTouch Delica Lancets 33G MISC   No No   Sig: May substitute brand based on insurance coverage. Check glucose TID.   albuterol (2.5 mg/3 mL) 0.083 % nebulizer solution   No No   Sig: Take 3 mL (2.5 mg total) by nebulization every 6 (six) hours as needed for wheezing or shortness of breath   glucose blood (OneTouch Verio) test strip   No No   Sig: TEST BLOOD SUGAR TWICE A DAY   glucose blood (OneTouch Verio) test strip   No No   Sig: May substitute brand based on insurance coverage. Check glucose TID.   ibuprofen (MOTRIN) 800 mg tablet   No No   Sig: Take 1 tablet (800 mg total) by mouth every 8 (eight) hours as needed for mild pain   insulin aspart (NovoLOG FlexPen) 100 UNIT/ML injection pen   No No   Sig: Inject 9 Units under the skin 3 (three) times a day with meals   metFORMIN (GLUCOPHAGE-XR) 500 mg 24 hr tablet   No No   Sig: Take 2 tablets (1,000 mg total) by mouth 2 (two) times a day with meals Start with 1 pill 500mg with breakfast x 1 week, then 1 pill 500mg with breakfast and 1 pill with dinner for 1 week , then 2 pills with breakfast (1000mg) and 1 pill with dinner (500mg) for 1 week, then 1000mg twice a day going forward   naproxen (Naprosyn) 500 mg tablet   No No   Sig: Take 1 tablet (500 mg total) by mouth 2 (two) times a day with  meals for 5 days   topiramate (TOPAMAX) 50 MG tablet   Yes No   Sig: Take 50 mg by mouth 2 (two) times a day      Facility-Administered Medications: None     Discharge Medication List as of 10/18/2024 11:39 AM        CONTINUE these medications which have NOT CHANGED    Details   Abilify Maintena 400 MG injection INJECT 2 ML INTRAMUSCUALRLY EVERY 4 WEEKS AS DIRECTED, Historical Med      albuterol (2.5 mg/3 mL) 0.083 % nebulizer solution Take 3 mL (2.5 mg total) by nebulization every 6 (six) hours as needed for wheezing or shortness of breath, Starting Wed 5/8/2024, Normal      Alcohol Swabs 70 % PADS May substitute brand based on insurance coverage. Check glucose TID., Normal      !! Blood Glucose Monitoring Suppl (OneTouch Verio Reflect) w/Device KIT May substitute brand based on insurance coverage. Check glucose TID., Normal      !! Blood Glucose Monitoring Suppl (OneTouch Verio) w/Device KIT Use 2 (two) times a day Ok to substitute with insurance covered brand, Starting Fri 10/27/2023, Normal      FLUoxetine (PROzac) 10 mg capsule Take 1 capsule (10 mg total) by mouth daily, Starting Wed 5/8/2024, Normal      !! glucose blood (OneTouch Verio) test strip TEST BLOOD SUGAR TWICE A DAY, Normal      !! glucose blood (OneTouch Verio) test strip May substitute brand based on insurance coverage. Check glucose TID., Normal      ibuprofen (MOTRIN) 800 mg tablet Take 1 tablet (800 mg total) by mouth every 8 (eight) hours as needed for mild pain, Starting Tue 8/20/2024, Normal      insulin aspart (NovoLOG FlexPen) 100 UNIT/ML injection pen Inject 9 Units under the skin 3 (three) times a day with meals, Starting Wed 5/8/2024, Normal      Insulin Glargine Solostar (Lantus SoloStar) 100 UNIT/ML SOPN Inject 0.3 mL (30 Units total) under the skin daily at bedtime, Starting Tue 5/7/2024, Normal      Insulin Pen Needle (BD Pen Needle Shani 2nd Gen) 32G X 4 MM MISC For use with insulin pen. Pharmacy may dispense brand covered by  insurance., Normal      !! Lancets (onetouch ultrasoft) lancets Use as instructed, Normal      metFORMIN (GLUCOPHAGE-XR) 500 mg 24 hr tablet Take 2 tablets (1,000 mg total) by mouth 2 (two) times a day with meals Start with 1 pill 500mg with breakfast x 1 week, then 1 pill 500mg with breakfast and 1 pill with dinner for 1 week , then 2 pills with breakfast (1000mg) and 1 pill with dinner (500 mg) for 1 week, then 1000mg twice a day going forward, Starting Tue 5/7/2024, Normal      naproxen (Naprosyn) 500 mg tablet Take 1 tablet (500 mg total) by mouth 2 (two) times a day with meals for 5 days, Starting Mon 10/14/2024, Until Sat 10/19/2024, Normal      !! OneTouch Delica Lancets 33G MISC May substitute brand based on insurance coverage. Check glucose TID., Normal      topiramate (TOPAMAX) 50 MG tablet Take 50 mg by mouth 2 (two) times a day, Starting Thu 3/10/2022, Historical Med       !! - Potential duplicate medications found. Please discuss with provider.        No discharge procedures on file.  ED SEPSIS DOCUMENTATION   Time reflects when diagnosis was documented in both MDM as applicable and the Disposition within this note       Time User Action Codes Description Comment    10/18/2024 11:02 AM Sharon Law [F90.9] ADHD     10/18/2024 11:38 AM Sharon Law [F84.0] Herbie Law DO  10/18/24 1141     Patient

## 2024-10-21 ENCOUNTER — APPOINTMENT (OUTPATIENT)
Dept: PULMONOLOGY | Facility: CLINIC | Age: 69
End: 2024-10-21

## 2024-11-05 RX ORDER — ALBUTEROL SULFATE 0.63 MG/3ML
0.63 SOLUTION RESPIRATORY (INHALATION)
Qty: 90 | Refills: 0 | Status: ACTIVE | COMMUNITY
Start: 2024-11-05 | End: 1900-01-01

## 2024-11-05 RX ORDER — ALBUTEROL SULFATE 90 UG/1
108 (90 BASE) INHALANT RESPIRATORY (INHALATION)
Qty: 1 | Refills: 1 | Status: ACTIVE | COMMUNITY
Start: 2024-11-05 | End: 1900-01-01

## 2024-11-25 ENCOUNTER — APPOINTMENT (OUTPATIENT)
Dept: PULMONOLOGY | Facility: CLINIC | Age: 69
End: 2024-11-25
Payer: MEDICARE

## 2024-11-25 VITALS
HEIGHT: 66 IN | HEART RATE: 100 BPM | DIASTOLIC BLOOD PRESSURE: 76 MMHG | OXYGEN SATURATION: 96 % | TEMPERATURE: 208.58 F | WEIGHT: 157 LBS | BODY MASS INDEX: 25.23 KG/M2 | SYSTOLIC BLOOD PRESSURE: 128 MMHG

## 2024-11-25 DIAGNOSIS — J44.9 CHRONIC OBSTRUCTIVE PULMONARY DISEASE, UNSPECIFIED: ICD-10-CM

## 2024-11-25 DIAGNOSIS — Z01.818 ENCOUNTER FOR OTHER PREPROCEDURAL EXAMINATION: ICD-10-CM

## 2024-11-25 DIAGNOSIS — R91.1 SOLITARY PULMONARY NODULE: ICD-10-CM

## 2024-11-25 PROCEDURE — 99205 OFFICE O/P NEW HI 60 MIN: CPT

## 2024-11-25 PROCEDURE — G2211 COMPLEX E/M VISIT ADD ON: CPT

## 2024-12-30 ENCOUNTER — RX RENEWAL (OUTPATIENT)
Age: 69
End: 2024-12-30

## 2025-01-09 ENCOUNTER — NON-APPOINTMENT (OUTPATIENT)
Age: 70
End: 2025-01-09

## 2025-01-12 ENCOUNTER — OUTPATIENT (OUTPATIENT)
Dept: OUTPATIENT SERVICES | Facility: HOSPITAL | Age: 70
LOS: 1 days | End: 2025-01-12
Payer: MEDICARE

## 2025-01-12 PROCEDURE — 71250 CT THORAX DX C-: CPT | Mod: MH

## 2025-01-12 PROCEDURE — 71250 CT THORAX DX C-: CPT | Mod: 26

## 2025-01-13 ENCOUNTER — RX RENEWAL (OUTPATIENT)
Age: 70
End: 2025-01-13

## 2025-01-14 ENCOUNTER — APPOINTMENT (OUTPATIENT)
Dept: PULMONOLOGY | Facility: CLINIC | Age: 70
End: 2025-01-14

## 2025-01-16 ENCOUNTER — NON-APPOINTMENT (OUTPATIENT)
Age: 70
End: 2025-01-16

## 2025-01-16 ENCOUNTER — APPOINTMENT (OUTPATIENT)
Dept: PULMONOLOGY | Facility: CLINIC | Age: 70
End: 2025-01-16
Payer: MEDICARE

## 2025-01-16 VITALS
RESPIRATION RATE: 12 BRPM | HEART RATE: 80 BPM | HEIGHT: 66 IN | WEIGHT: 159 LBS | SYSTOLIC BLOOD PRESSURE: 134 MMHG | DIASTOLIC BLOOD PRESSURE: 79 MMHG | TEMPERATURE: 207.86 F | BODY MASS INDEX: 25.55 KG/M2 | OXYGEN SATURATION: 93 %

## 2025-01-16 DIAGNOSIS — R06.09 OTHER FORMS OF DYSPNEA: ICD-10-CM

## 2025-01-16 DIAGNOSIS — J44.9 CHRONIC OBSTRUCTIVE PULMONARY DISEASE, UNSPECIFIED: ICD-10-CM

## 2025-01-16 DIAGNOSIS — Z87.891 PERSONAL HISTORY OF NICOTINE DEPENDENCE: ICD-10-CM

## 2025-01-16 DIAGNOSIS — G47.33 OBSTRUCTIVE SLEEP APNEA (ADULT) (PEDIATRIC): ICD-10-CM

## 2025-01-16 DIAGNOSIS — J45.909 UNSPECIFIED ASTHMA, UNCOMPLICATED: ICD-10-CM

## 2025-01-16 DIAGNOSIS — R91.1 SOLITARY PULMONARY NODULE: ICD-10-CM

## 2025-01-16 DIAGNOSIS — A31.0 PULMONARY MYCOBACTERIAL INFECTION: ICD-10-CM

## 2025-01-16 PROCEDURE — 94618 PULMONARY STRESS TESTING: CPT

## 2025-01-16 PROCEDURE — 99215 OFFICE O/P EST HI 40 MIN: CPT | Mod: 25

## 2025-01-16 PROCEDURE — 94727 GAS DIL/WSHOT DETER LNG VOL: CPT

## 2025-01-16 PROCEDURE — 94729 DIFFUSING CAPACITY: CPT

## 2025-01-16 PROCEDURE — 94060 EVALUATION OF WHEEZING: CPT

## 2025-01-21 LAB — BACTERIA SPT CULT: NORMAL

## 2025-01-25 LAB — ACID FAST STN SPT: NORMAL

## 2025-01-27 ENCOUNTER — NON-APPOINTMENT (OUTPATIENT)
Age: 70
End: 2025-01-27

## 2025-02-03 ENCOUNTER — NON-APPOINTMENT (OUTPATIENT)
Age: 70
End: 2025-02-03

## 2025-02-04 ENCOUNTER — NON-APPOINTMENT (OUTPATIENT)
Age: 70
End: 2025-02-04

## 2025-02-07 ENCOUNTER — APPOINTMENT (OUTPATIENT)
Dept: PULMONOLOGY | Facility: CLINIC | Age: 70
End: 2025-02-07

## 2025-02-11 ENCOUNTER — EMERGENCY (EMERGENCY)
Facility: HOSPITAL | Age: 70
LOS: 0 days | Discharge: HOME | End: 2025-02-11
Attending: STUDENT IN AN ORGANIZED HEALTH CARE EDUCATION/TRAINING PROGRAM
Payer: MEDICARE

## 2025-02-11 VITALS
DIASTOLIC BLOOD PRESSURE: 79 MMHG | WEIGHT: 169.09 LBS | HEART RATE: 97 BPM | OXYGEN SATURATION: 94 % | TEMPERATURE: 98 F | SYSTOLIC BLOOD PRESSURE: 146 MMHG | RESPIRATION RATE: 19 BRPM | HEIGHT: 66 IN

## 2025-02-11 VITALS
TEMPERATURE: 98 F | HEART RATE: 67 BPM | SYSTOLIC BLOOD PRESSURE: 118 MMHG | OXYGEN SATURATION: 95 % | RESPIRATION RATE: 18 BRPM | DIASTOLIC BLOOD PRESSURE: 77 MMHG

## 2025-02-11 DIAGNOSIS — J44.9 CHRONIC OBSTRUCTIVE PULMONARY DISEASE, UNSPECIFIED: ICD-10-CM

## 2025-02-11 DIAGNOSIS — I10 ESSENTIAL (PRIMARY) HYPERTENSION: ICD-10-CM

## 2025-02-11 DIAGNOSIS — Z87.440 PERSONAL HISTORY OF URINARY (TRACT) INFECTIONS: ICD-10-CM

## 2025-02-11 DIAGNOSIS — Z87.442 PERSONAL HISTORY OF URINARY CALCULI: ICD-10-CM

## 2025-02-11 DIAGNOSIS — Z79.01 LONG TERM (CURRENT) USE OF ANTICOAGULANTS: ICD-10-CM

## 2025-02-11 DIAGNOSIS — N20.0 CALCULUS OF KIDNEY: ICD-10-CM

## 2025-02-11 DIAGNOSIS — I48.91 UNSPECIFIED ATRIAL FIBRILLATION: ICD-10-CM

## 2025-02-11 DIAGNOSIS — N93.9 ABNORMAL UTERINE AND VAGINAL BLEEDING, UNSPECIFIED: ICD-10-CM

## 2025-02-11 LAB
ALBUMIN SERPL ELPH-MCNC: 3.8 G/DL — SIGNIFICANT CHANGE UP (ref 3.3–5)
ALP SERPL-CCNC: 53 U/L — SIGNIFICANT CHANGE UP (ref 40–120)
ALT FLD-CCNC: 16 U/L — SIGNIFICANT CHANGE UP (ref 12–78)
ANION GAP SERPL CALC-SCNC: 3 MMOL/L — LOW (ref 5–17)
APPEARANCE UR: CLEAR — SIGNIFICANT CHANGE UP
AST SERPL-CCNC: 10 U/L — LOW (ref 15–37)
BASOPHILS # BLD AUTO: 0.01 K/UL — SIGNIFICANT CHANGE UP (ref 0–0.2)
BASOPHILS NFR BLD AUTO: 0.2 % — SIGNIFICANT CHANGE UP (ref 0–2)
BILIRUB SERPL-MCNC: 0.7 MG/DL — SIGNIFICANT CHANGE UP (ref 0.2–1.2)
BILIRUB UR-MCNC: NEGATIVE — SIGNIFICANT CHANGE UP
BUN SERPL-MCNC: 12 MG/DL — SIGNIFICANT CHANGE UP (ref 7–23)
CALCIUM SERPL-MCNC: 8.8 MG/DL — SIGNIFICANT CHANGE UP (ref 8.5–10.1)
CHLORIDE SERPL-SCNC: 111 MMOL/L — HIGH (ref 96–108)
CO2 SERPL-SCNC: 28 MMOL/L — SIGNIFICANT CHANGE UP (ref 22–31)
COLOR SPEC: YELLOW — SIGNIFICANT CHANGE UP
CREAT SERPL-MCNC: 0.69 MG/DL — SIGNIFICANT CHANGE UP (ref 0.5–1.3)
DIFF PNL FLD: ABNORMAL
EGFR: 93 ML/MIN/1.73M2 — SIGNIFICANT CHANGE UP
EOSINOPHIL # BLD AUTO: 0.03 K/UL — SIGNIFICANT CHANGE UP (ref 0–0.5)
EOSINOPHIL NFR BLD AUTO: 0.5 % — SIGNIFICANT CHANGE UP (ref 0–6)
GLUCOSE SERPL-MCNC: 102 MG/DL — HIGH (ref 70–99)
GLUCOSE UR QL: NEGATIVE MG/DL — SIGNIFICANT CHANGE UP
HCT VFR BLD CALC: 33.8 % — LOW (ref 34.5–45)
HGB BLD-MCNC: 11.4 G/DL — LOW (ref 11.5–15.5)
IMM GRANULOCYTES NFR BLD AUTO: 0.4 % — SIGNIFICANT CHANGE UP (ref 0–0.9)
KETONES UR-MCNC: NEGATIVE MG/DL — SIGNIFICANT CHANGE UP
LEUKOCYTE ESTERASE UR-ACNC: ABNORMAL
LIDOCAIN IGE QN: 26 U/L — SIGNIFICANT CHANGE UP (ref 13–75)
LYMPHOCYTES # BLD AUTO: 1.38 K/UL — SIGNIFICANT CHANGE UP (ref 1–3.3)
LYMPHOCYTES # BLD AUTO: 25.2 % — SIGNIFICANT CHANGE UP (ref 13–44)
MCHC RBC-ENTMCNC: 31.8 PG — SIGNIFICANT CHANGE UP (ref 27–34)
MCHC RBC-ENTMCNC: 33.7 G/DL — SIGNIFICANT CHANGE UP (ref 32–36)
MCV RBC AUTO: 94.2 FL — SIGNIFICANT CHANGE UP (ref 80–100)
MONOCYTES # BLD AUTO: 0.3 K/UL — SIGNIFICANT CHANGE UP (ref 0–0.9)
MONOCYTES NFR BLD AUTO: 5.5 % — SIGNIFICANT CHANGE UP (ref 2–14)
NEUTROPHILS # BLD AUTO: 3.74 K/UL — SIGNIFICANT CHANGE UP (ref 1.8–7.4)
NEUTROPHILS NFR BLD AUTO: 68.2 % — SIGNIFICANT CHANGE UP (ref 43–77)
NITRITE UR-MCNC: NEGATIVE — SIGNIFICANT CHANGE UP
NRBC BLD AUTO-RTO: 0 /100 WBCS — SIGNIFICANT CHANGE UP (ref 0–0)
PH UR: 6.5 — SIGNIFICANT CHANGE UP (ref 5–8)
PLATELET # BLD AUTO: 169 K/UL — SIGNIFICANT CHANGE UP (ref 150–400)
POTASSIUM SERPL-MCNC: 3.1 MMOL/L — LOW (ref 3.5–5.3)
POTASSIUM SERPL-SCNC: 3.1 MMOL/L — LOW (ref 3.5–5.3)
PROT SERPL-MCNC: 7.7 GM/DL — SIGNIFICANT CHANGE UP (ref 6–8.3)
PROT UR-MCNC: NEGATIVE MG/DL — SIGNIFICANT CHANGE UP
RBC # BLD: 3.59 M/UL — LOW (ref 3.8–5.2)
RBC # FLD: 12.1 % — SIGNIFICANT CHANGE UP (ref 10.3–14.5)
SODIUM SERPL-SCNC: 142 MMOL/L — SIGNIFICANT CHANGE UP (ref 135–145)
SP GR SPEC: 1.01 — SIGNIFICANT CHANGE UP (ref 1–1.03)
TROPONIN I, HIGH SENSITIVITY RESULT: 5.3 NG/L — SIGNIFICANT CHANGE UP
UROBILINOGEN FLD QL: 1 MG/DL — SIGNIFICANT CHANGE UP (ref 0.2–1)
WBC # BLD: 5.48 K/UL — SIGNIFICANT CHANGE UP (ref 3.8–10.5)
WBC # FLD AUTO: 5.48 K/UL — SIGNIFICANT CHANGE UP (ref 3.8–10.5)

## 2025-02-11 PROCEDURE — 93010 ELECTROCARDIOGRAM REPORT: CPT

## 2025-02-11 PROCEDURE — 74177 CT ABD & PELVIS W/CONTRAST: CPT | Mod: 26

## 2025-02-11 PROCEDURE — 99285 EMERGENCY DEPT VISIT HI MDM: CPT | Mod: FS

## 2025-02-11 RX ORDER — POTASSIUM CHLORIDE 750 MG/1
40 TABLET, EXTENDED RELEASE ORAL ONCE
Refills: 0 | Status: COMPLETED | OUTPATIENT
Start: 2025-02-11 | End: 2025-02-11

## 2025-02-11 RX ORDER — METOPROLOL SUCCINATE 25 MG
1 TABLET, EXTENDED RELEASE 24 HR ORAL
Refills: 0 | DISCHARGE

## 2025-02-11 RX ORDER — BACTERIOSTATIC SODIUM CHLORIDE 0.9 %
500 VIAL (ML) INJECTION ONCE
Refills: 0 | Status: COMPLETED | OUTPATIENT
Start: 2025-02-11 | End: 2025-02-11

## 2025-02-11 RX ORDER — LOSARTAN POTASSIUM 100 MG
1 TABLET ORAL
Refills: 0 | DISCHARGE

## 2025-02-11 RX ADMIN — POTASSIUM CHLORIDE 40 MILLIEQUIVALENT(S): 750 TABLET, EXTENDED RELEASE ORAL at 12:35

## 2025-02-11 RX ADMIN — Medication 500 MILLILITER(S): at 10:53

## 2025-02-11 NOTE — ED PROVIDER NOTE - PHYSICAL EXAMINATION
CONSTITUTIONAL: A&O x3, NAD, resting comfortably, well groomed  HEAD: Normocephalic, atraumatic.   NECK:  Airway patent. Neck Supple. FROM without pain.  No lymphadenopathy. Trachea midline.   CARDIAC: RRR, normal S1/2, no murmurs, rubs, gallops. CW non-TTP, no CW deformity.  RESPIRATORY: CTA B/L, good aeration. No wheezing, rales, adventitious breath sounds. No accessory muscle use.    ABDOMEN: soft, non-distended; +epigastric-TTP, No RUQ/RLQ/LUQ/LLQ pain, no rebound tenderness or guarding, BS + x4 quadrants, no pulsating masses, scars. + R CVA tenderness.   MSK: Moving all extremities.   SKIN: Warm, dry, color WNL, no turgor, erythema or rashes. Cap refill < 2 sec.  NEURO: A&Ox3, interactive, cooperative, no focal deficits. CONSTITUTIONAL: A&O x3, NAD, resting comfortably, well groomed  HEAD: Normocephalic, atraumatic.   NECK:  Airway patent. Neck Supple. FROM without pain.  No lymphadenopathy. Trachea midline.   CARDIAC: RRR, normal S1/2, no murmurs, rubs, gallops. CW non-TTP, no CW deformity.  RESPIRATORY: CTA B/L, good aeration. No wheezing, rales, adventitious breath sounds. No accessory muscle use.    ABDOMEN: soft, non-distended; mild right sided abdominal tendernss, no rebound tenderness or guarding, BS + x4 quadrants, no pulsating masses, scars. + R CVA tenderness.   MSK: Moving all extremities.   SKIN: Warm, dry, color WNL, no turgor, erythema or rashes. Cap refill < 2 sec.  NEURO: A&Ox3, interactive, cooperative, no focal deficits.

## 2025-02-11 NOTE — ED PROVIDER NOTE - PROGRESS NOTE DETAILS
Oleg Escobar, EM NP Fellow: Labs nonactionable, UA with 2rbc, 3 wbc, few bacteria, negative nitrates urine culture sent, results pending. . CT results " No acute abdominopelvic findings. Nonobstructing right renal calculi. No hydronephrosis. Unremarkable CT appearance of the urinary bladder." results discussed with patient, patient states she had another episode of hematuria, denies CP, SOB, heart racing, dizziness, dysuria, abdominal pain. Patient offered and declined pelvic exam to r/o vaginal bleeding. Patient endorses she has urologist she will follow up with. Patient provided strict return precautions, provided time to ask questions all which were answered at the beside by me.

## 2025-02-11 NOTE — ED PROVIDER NOTE - CLINICAL SUMMARY MEDICAL DECISION MAKING FREE TEXT BOX
Oleg Escobar, EM NP Fellow: Patient is 68yo F PMH HTN, copd on 2LNC @ home, afib on Eliquis, UTIs, kidney stones, gastritis, presents with c/o lower abd pain with hematuria x5 days. Patient endorses hematuria worse in the morning improves as day goes on, with urgency, denies dysuria. Patient denies fevers, chills, HA, dizziness, CP, SOB, new leg pain or swelling. Contrary to triage note patient denies vaginal bleeding, also denies rectal bleeding.  Patient well appearing, nontoxic, and in no apparent distress. Physical exam as above, pertinent for abdomen is soft, non-distended; +epigastric-TTP, No RUQ/RLQ/LUQ/LLQ pain, no rebound tenderness or guarding.   ddx concerning for but not limited to uti vs hemorrhagic cystitis vs kidney stone vs gastritis. Less concern for acute appendicitis vs diverticulitis in the absence of lower abdominal pain. Less concern for ACS in the setting of no chest pain, EKG is non-ischemic.   will order labs, urinalysis, CT abdomen and pelvis, and reassess. Patient offered and declined pain and nausea medications at this time.  disposition pending results and reassessment, patient will likely be discharged home with PMD vs urology follow up. Oleg Escobar, ANA ROSA NP Fellow: Patient is 70yo F PMH HTN, copd on 2LNC @ home, afib on Eliquis, UTIs, kidney stones, gastritis, presents with c/o lower abd pain with hematuria x5 days. Patient endorses hematuria worse in the morning improves as day goes on, with urgency, denies dysuria. Patient denies fevers, chills, HA, dizziness, CP, SOB, new leg pain or swelling. Contrary to triage note patient denies vaginal bleeding, also denies rectal bleeding.  Patient well appearing, nontoxic, and in no apparent distress. Physical exam as above, pertinent for abdomen is soft, non-distended; mild right sided abdominal tendernss, no rebound tenderness or guarding.   ddx concerning for but not limited to uti vs hemorrhagic cystitis vs kidney stone vs gastritis. Less concern for acute appendicitis vs diverticulitis in the absence of lower abdominal pain. Less concern for ACS in the setting of no chest pain, EKG is non-ischemic.   will order labs, urinalysis, CT abdomen and pelvis, and reassess. Patient offered and declined pain and nausea medications at this time.  disposition pending results and reassessment, patient will likely be discharged home with PMD vs urology follow up.

## 2025-02-11 NOTE — ED ADULT NURSE NOTE - CAS DISCH TRANSFER METHOD
"Dereck Centeno is a 63 y.o. male patient.    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 The vent was set to volume control settings with 100%Fi.  The bronchoscope was passed through the tracheostomy tube.  The trachea was observed with identification of the elton and right and left mainstem bronchi.  The right bronchus was first entered with identification of the superior lobe.  The middle and inferior lobe bronchus were then entered and evaluated.  No significant bleeding was noted during the exam of the right lung.  Next the left mainstem bronchus was entered with identification of the upper and lower left lobe bronchi.  No significant bleeding was noted during the exam of the left lung.  Blood was suctioned from the airways and no significant pooling of blood noted on completion of bronchoscopy.  The scope was withdrawn.  The patient tolerated the procedure well.     EBL: Minimal      "
"Dereck Centeno is a 63 y.o. male patient.    Temp: 99.7 °F (37.6 °C) (02/26/23 2302)  Pulse: (!) 136 (02/27/23 0410)  Resp: (!) 29 (02/27/23 0410)  BP: (!) 161/84 (02/27/23 0405)  SpO2: (!) 74 % (02/27/23 0410)  Weight: 75.9 kg (167 lb 5.3 oz) (02/20/23 0709)  Height: 5' 10" (177.8 cm) (02/25/23 0735)       Arterial Line    Date/Time: 2/27/2023 4:30 AM  Location procedure was performed: [facility]  Performed by: Megan Green MD  Authorized by: Megan Green MD   Consent Done: Emergent Situation  Preparation: Patient was prepped and draped in the usual sterile fashion.  Indications: multiple ABGs, respiratory failure and hemodynamic monitoring  Location: left radial    Patient sedated: yes  Needle gauge: 20  Seldinger technique: Seldinger technique used  Number of attempts: 1  Complications: No  Post-procedure: line sutured and dressing applied  Post-procedure CMS: normal  Patient tolerance: Patient tolerated the procedure well with no immediate complications        2/27/2023    "
Private car

## 2025-02-11 NOTE — ED PROVIDER NOTE - NSFOLLOWUPINSTRUCTIONS_ED_ALL_ED_FT
- You were seen in the ER today for blood in the urine.     - Blood work and urinalysis and CT scan of the abdomen and pelvis was performed which were within normal limits, please see attached results below.    - A urine culture was sent to the lab and is still pending, someone will call you if you have a positive result.     - Please follow up with your urologist in the next 48-72 hours, bring a copy of your results.     - Patient return to the ER for severe abdominal pain, back pain, uncontrolled vomiting, blood in the vomit or stool, fever, no urine output in more than 12 hours, palpitations, dizziness, lightheadedness, passing out, change in behavior or mental status, or any other concerns.

## 2025-02-11 NOTE — ED PROVIDER NOTE - NSICDXPASTSURGICALHX_GEN_ALL_CORE_FT
PAST SURGICAL HISTORY:  H/O cardiac catheterization in 2012 in [facility]- denies intervention

## 2025-02-11 NOTE — ED PROVIDER NOTE - PATIENT PORTAL LINK FT
You can access the FollowMyHealth Patient Portal offered by [facility] by registering at the following website: [email or web address]. By joining [facility]’s FollowMyHealth portal, you will also be able to view your health information using other applications (apps) compatible with our system.

## 2025-02-11 NOTE — ED ADULT NURSE NOTE - OBJECTIVE STATEMENT
pt AOX4 present to the ED c/o hematuria since sunday night. states she noted lower abdominal pain before the bleeding started. history Afib on Eliquis, htn , copd, high cholesterol, sleep apnea.

## 2025-02-11 NOTE — ED PROVIDER NOTE - CARE PLAN
Principal Discharge DX:	Flank pain   1 Principal Discharge DX:	Flank pain  Secondary Diagnosis:	Hematuria

## 2025-02-11 NOTE — ED ADULT TRIAGE NOTE - CHIEF COMPLAINT QUOTE
Came in for lower abdominal pain started Saturday and vaginal bleeding started Sunday. No pain complained at this time. PMH HTN, COPD on 2L home o2 PRN at home, afib-on eliquis

## 2025-02-11 NOTE — ED PROVIDER NOTE - ATTENDING APP SHARED VISIT CONTRIBUTION OF CARE
I have personally performed a face to face medical and diagnostic evaluation of the patient. I have discussed with and reviewed the FLACO's note and agree with the History, ROS, Physical Exam and MDM unless otherwise indicated. A brief summary of my personal evaluation and impression can be found below. I actively participated in the comanagement of this patient with the FLACO. I have personally reviewed all orders, study/imaging results, medication orders. I discussed indications for consultant evaluation and consultant recommendations with the FLACO when applicable, and have discussed the disposition plan with the FLACO.     69-year-old female with a past medical history of hypertension, A-fib on Eliquis, COPD on 2 L nasal cannula baseline, gastritis, kidney stones, UTIs presenting with abdominal pain and hematuria. Patient developed lower abdominal pain radiating to the right flank a few days ago. Patient states every morning she wakes up with blood in her urine that improves throughout the day. Associated urgency. Denies fevers, chest pain, difficulty breathing, nausea, vomiting, dysuria, chills. Contrary to triage note patient denies vaginal bleeding.    General: Appears well and nontoxic  Mentation: A&O x 3  psych: mood appropriate  HEENT: airway patent, conjunctivae clear bilaterally  Resp: symmetric chest rise, no resp distress, breath sounds CTA bilaterally  Cardio: RRR, no m/r/g  GI: soft/nondistended, mild right sided abdominal tenderness  : mild right CVA tenderness  Neuro: sensation and motor function grossly intact  Skin: no cyanosis, no jaundice  MSK: normal movement of all extremities  Lymph/Vasc: no DOREEN edema    Possible UTI versus kidney stone. CBC, CMP, urinalysis, CT abdomen pelvis.

## 2025-02-12 LAB
CULTURE RESULTS: SIGNIFICANT CHANGE UP
SPECIMEN SOURCE: SIGNIFICANT CHANGE UP

## 2025-02-17 ENCOUNTER — NON-APPOINTMENT (OUTPATIENT)
Age: 70
End: 2025-02-17

## 2025-02-26 ENCOUNTER — NON-APPOINTMENT (OUTPATIENT)
Age: 70
End: 2025-02-26

## 2025-03-06 ENCOUNTER — NON-APPOINTMENT (OUTPATIENT)
Age: 70
End: 2025-03-06

## 2025-03-31 ENCOUNTER — RX RENEWAL (OUTPATIENT)
Age: 70
End: 2025-03-31

## 2025-04-07 NOTE — ED ADULT NURSE NOTE - MUSCULOSKELETAL ASSESSMENT
Recommended she use glycerin suppository and MiraLAX to facilitate bowel movement.    Recommend hydration with Pedialyte prune juice.    Follow-up with pediatrician to discuss this ER visit return to ER for worsening  
- - -

## 2025-04-22 ENCOUNTER — EMERGENCY (EMERGENCY)
Facility: HOSPITAL | Age: 70
LOS: 0 days | Discharge: HOME | End: 2025-04-22
Attending: STUDENT IN AN ORGANIZED HEALTH CARE EDUCATION/TRAINING PROGRAM
Payer: MEDICAID

## 2025-04-22 VITALS
DIASTOLIC BLOOD PRESSURE: 87 MMHG | RESPIRATION RATE: 18 BRPM | HEART RATE: 106 BPM | SYSTOLIC BLOOD PRESSURE: 110 MMHG | OXYGEN SATURATION: 99 %

## 2025-04-22 VITALS
OXYGEN SATURATION: 94 % | RESPIRATION RATE: 20 BRPM | DIASTOLIC BLOOD PRESSURE: 52 MMHG | HEART RATE: 125 BPM | HEIGHT: 66 IN | TEMPERATURE: 98 F | WEIGHT: 160.06 LBS | SYSTOLIC BLOOD PRESSURE: 98 MMHG

## 2025-04-22 LAB
ALBUMIN SERPL ELPH-MCNC: 3.7 G/DL — SIGNIFICANT CHANGE UP (ref 3.3–5)
ALP SERPL-CCNC: 53 U/L — SIGNIFICANT CHANGE UP (ref 40–120)
ALT FLD-CCNC: 16 U/L — SIGNIFICANT CHANGE UP (ref 12–78)
ANION GAP SERPL CALC-SCNC: 3 MMOL/L — LOW (ref 5–17)
APPEARANCE UR: CLEAR — SIGNIFICANT CHANGE UP
APTT BLD: 31.9 SEC — SIGNIFICANT CHANGE UP (ref 26.1–36.8)
AST SERPL-CCNC: 10 U/L — LOW (ref 15–37)
BASOPHILS # BLD AUTO: 0.02 K/UL — SIGNIFICANT CHANGE UP (ref 0–0.2)
BASOPHILS NFR BLD AUTO: 0.2 % — SIGNIFICANT CHANGE UP (ref 0–2)
BILIRUB SERPL-MCNC: 0.6 MG/DL — SIGNIFICANT CHANGE UP (ref 0.2–1.2)
BILIRUB UR-MCNC: NEGATIVE — SIGNIFICANT CHANGE UP
BUN SERPL-MCNC: 10 MG/DL — SIGNIFICANT CHANGE UP (ref 7–23)
CALCIUM SERPL-MCNC: 9 MG/DL — SIGNIFICANT CHANGE UP (ref 8.5–10.1)
CHLORIDE SERPL-SCNC: 111 MMOL/L — HIGH (ref 96–108)
CO2 SERPL-SCNC: 27 MMOL/L — SIGNIFICANT CHANGE UP (ref 22–31)
COLOR SPEC: YELLOW — SIGNIFICANT CHANGE UP
CREAT SERPL-MCNC: 0.87 MG/DL — SIGNIFICANT CHANGE UP (ref 0.5–1.3)
DIFF PNL FLD: ABNORMAL
EGFR: 71 ML/MIN/1.73M2 — SIGNIFICANT CHANGE UP
EGFR: 71 ML/MIN/1.73M2 — SIGNIFICANT CHANGE UP
EOSINOPHIL # BLD AUTO: 0.02 K/UL — SIGNIFICANT CHANGE UP (ref 0–0.5)
EOSINOPHIL NFR BLD AUTO: 0.2 % — SIGNIFICANT CHANGE UP (ref 0–6)
FLUAV AG NPH QL: SIGNIFICANT CHANGE UP
FLUBV AG NPH QL: SIGNIFICANT CHANGE UP
GLUCOSE SERPL-MCNC: 99 MG/DL — SIGNIFICANT CHANGE UP (ref 70–99)
GLUCOSE UR QL: NEGATIVE MG/DL — SIGNIFICANT CHANGE UP
HCT VFR BLD CALC: 39.2 % — SIGNIFICANT CHANGE UP (ref 34.5–45)
HGB BLD-MCNC: 13.1 G/DL — SIGNIFICANT CHANGE UP (ref 11.5–15.5)
IMM GRANULOCYTES NFR BLD AUTO: 0.2 % — SIGNIFICANT CHANGE UP (ref 0–0.9)
INR BLD: 1.26 RATIO — HIGH (ref 0.85–1.16)
KETONES UR-MCNC: NEGATIVE MG/DL — SIGNIFICANT CHANGE UP
LEUKOCYTE ESTERASE UR-ACNC: NEGATIVE — SIGNIFICANT CHANGE UP
LYMPHOCYTES # BLD AUTO: 1.95 K/UL — SIGNIFICANT CHANGE UP (ref 1–3.3)
LYMPHOCYTES # BLD AUTO: 24 % — SIGNIFICANT CHANGE UP (ref 13–44)
MAGNESIUM SERPL-MCNC: 2.1 MG/DL — SIGNIFICANT CHANGE UP (ref 1.6–2.6)
MCHC RBC-ENTMCNC: 31.6 PG — SIGNIFICANT CHANGE UP (ref 27–34)
MCHC RBC-ENTMCNC: 33.4 G/DL — SIGNIFICANT CHANGE UP (ref 32–36)
MCV RBC AUTO: 94.5 FL — SIGNIFICANT CHANGE UP (ref 80–100)
MONOCYTES # BLD AUTO: 0.49 K/UL — SIGNIFICANT CHANGE UP (ref 0–0.9)
MONOCYTES NFR BLD AUTO: 6 % — SIGNIFICANT CHANGE UP (ref 2–14)
NEUTROPHILS # BLD AUTO: 5.62 K/UL — SIGNIFICANT CHANGE UP (ref 1.8–7.4)
NEUTROPHILS NFR BLD AUTO: 69.4 % — SIGNIFICANT CHANGE UP (ref 43–77)
NITRITE UR-MCNC: NEGATIVE — SIGNIFICANT CHANGE UP
NRBC BLD AUTO-RTO: 0 /100 WBCS — SIGNIFICANT CHANGE UP (ref 0–0)
NT-PROBNP SERPL-SCNC: 1237 PG/ML — HIGH (ref 0–125)
PH UR: 6 — SIGNIFICANT CHANGE UP (ref 5–8)
PLATELET # BLD AUTO: 243 K/UL — SIGNIFICANT CHANGE UP (ref 150–400)
POTASSIUM SERPL-MCNC: 3.6 MMOL/L — SIGNIFICANT CHANGE UP (ref 3.5–5.3)
POTASSIUM SERPL-SCNC: 3.6 MMOL/L — SIGNIFICANT CHANGE UP (ref 3.5–5.3)
PROT SERPL-MCNC: 8 GM/DL — SIGNIFICANT CHANGE UP (ref 6–8.3)
PROT UR-MCNC: NEGATIVE MG/DL — SIGNIFICANT CHANGE UP
PROTHROM AB SERPL-ACNC: 14.2 SEC — HIGH (ref 9.9–13.4)
RBC # BLD: 4.15 M/UL — SIGNIFICANT CHANGE UP (ref 3.8–5.2)
RBC # FLD: 12.5 % — SIGNIFICANT CHANGE UP (ref 10.3–14.5)
RSV RNA NPH QL NAA+NON-PROBE: SIGNIFICANT CHANGE UP
SARS-COV-2 RNA SPEC QL NAA+PROBE: SIGNIFICANT CHANGE UP
SODIUM SERPL-SCNC: 141 MMOL/L — SIGNIFICANT CHANGE UP (ref 135–145)
SOURCE RESPIRATORY: SIGNIFICANT CHANGE UP
SP GR SPEC: 1.01 — SIGNIFICANT CHANGE UP (ref 1–1.03)
TROPONIN I, HIGH SENSITIVITY RESULT: 4.9 NG/L — SIGNIFICANT CHANGE UP
UROBILINOGEN FLD QL: 1 MG/DL — SIGNIFICANT CHANGE UP (ref 0.2–1)
WBC # BLD: 8.12 K/UL — SIGNIFICANT CHANGE UP (ref 3.8–10.5)
WBC # FLD AUTO: 8.12 K/UL — SIGNIFICANT CHANGE UP (ref 3.8–10.5)

## 2025-04-22 PROCEDURE — 71045 X-RAY EXAM CHEST 1 VIEW: CPT | Mod: 26

## 2025-04-22 PROCEDURE — 93010 ELECTROCARDIOGRAM REPORT: CPT

## 2025-04-22 PROCEDURE — 99285 EMERGENCY DEPT VISIT HI MDM: CPT

## 2025-04-22 RX ORDER — METOPROLOL SUCCINATE 50 MG/1
5 TABLET, EXTENDED RELEASE ORAL ONCE
Refills: 0 | Status: COMPLETED | OUTPATIENT
Start: 2025-04-22 | End: 2025-04-22

## 2025-04-22 RX ORDER — METOPROLOL SUCCINATE 50 MG/1
25 TABLET, EXTENDED RELEASE ORAL DAILY
Refills: 0 | Status: DISCONTINUED | OUTPATIENT
Start: 2025-04-22 | End: 2025-04-22

## 2025-04-22 RX ADMIN — METOPROLOL SUCCINATE 5 MILLIGRAM(S): 50 TABLET, EXTENDED RELEASE ORAL at 14:42

## 2025-04-22 RX ADMIN — METOPROLOL SUCCINATE 5 MILLIGRAM(S): 50 TABLET, EXTENDED RELEASE ORAL at 16:58

## 2025-04-22 RX ADMIN — METOPROLOL SUCCINATE 25 MILLIGRAM(S): 50 TABLET, EXTENDED RELEASE ORAL at 15:54

## 2025-04-22 NOTE — ED PROVIDER NOTE - ATTENDING APP SHARED VISIT CONTRIBUTION OF CARE
69yo female with pmh asthma, afib, copd, htn, presenting with generalized weakness this morning.  Went to  and was found to have high hr afib with rvr to 130s so sent to ED.  No focal symptoms.  Currently here in ed reports feeling better.  Took her AM meds.  No fevers or recent illnesses.  Denies pain, sob, edema, cough.  Tachycardic in afib on exam, well appearing in nad.  Will need labs to eval for lytes, bárbara, infection, reassess.

## 2025-04-22 NOTE — ED ADULT TRIAGE NOTE - BEFAST BALANCE
Cooling: DCD 50/50 Detail Level: Detailed Spot Size: 18 mm Endpoint: Lentigines: Immediate endpoint: perilesional erythema and edema. Vaseline and ice applied. Post care reviewed with patient. Consent: Written consent obtained, risks reviewed including but not limited to crusting, scabbing, blistering, scarring, darker or lighter pigmentary change, paradoxical hair regrowth, incomplete removal of hair and infection. Pulse Duration: 3 ms Indication: Laser Hair Removal Post-Care Instructions: I reviewed with the patient in detail post-care instructions. Patient should avoid sun for a minimum of 4 weeks before and after treatment. Fluence: 16 Total Pulses: 14 External Cooling: Yue Cryo 6 External Cooling Fan Speed: 1 Price (Use Numbers Only, No Special Characters Or $): 50.00 Laser Type: Alexandrite 755nm Endpoint: Laser Hair Removal: Immediate endpoint: perifollicular erythema and edema. Vaseline and ice applied. Post care reviewed with patient.\\n\\nPatient on augmenten had to reschedule ten days from last pill. No

## 2025-04-22 NOTE — ED PROVIDER NOTE - PHYSICAL EXAMINATION
CONSTITUTIONAL: A&O x3, NAD, resting comfortably, well groomed  HEAD: Normocephalic, atraumatic.   NECK:  Airway patent. Neck Supple. FROM without pain.  No lymphadenopathy. Trachea midline.   CARDIAC: tachycardic and irregularly irregular, normal S1/2, no murmurs, rubs, gallops. CW non-TTP, no CW deformity.   RESPIRATORY: CTA B/L, good aeration. No wheezing, rales, adventitious breath sounds. No accessory muscle use.    ABDOMEN: soft, non-distended; non-TTP, No RUQ/RLQ/LUQ/LLQ pain, no rebound tenderness or guarding,  BS + x4 quadrants, no pulsating masses, scars. No CVA tenderness.  PERIPHERAL VASCULAR: No edema of feet and ankles. No calf tenderness. Pulses 2+ B/L.   MSK: Moving all extremities.   SKIN: Warm, dry, color WNL, no turgor, erythema or rashes. Cap refill < 2 sec.  NEURO: A&Ox3, interactive, cooperative, no focal deficits.

## 2025-04-22 NOTE — ED ADULT NURSE NOTE - IS THE PATIENT ABLE TO BE SCREENED?
Patient is a 52y old  Female who presents with a chief complaint of Diffuse abdominal pain , found to have large necrotic fibroid with   small foci of gas with concern of infection. Patient hypotensive , tachycardic , WBC 16k , T max - 100.6 . Started on iv Abx , cultures sent . Planned for urgent surgery . Medicine consulted   for preop optimization. ID consulted .   Patient seen and examined , sitting in the chair in ED , AAOX4, c/o mid abdominal pain , episodes of vomiting , feels fatigued     CC: ABDOMINAL PAIN , HEMATURIA , FATIGUED , vomiting       HPI:   52F with PMH of HTN , DM - Type 2 , STEPHANIE , obesity uterine  fibroids presents to ED with 1 week of abdominal pain accompanied by vomiting and blood in the urine. Pain started 1 week prior and was initially located in the RUQ/RLQ, but has worsened today and is now diffuse. Pain is described as stabbing and episodic; episodes last approx. 1 hour and are partially relieved by Tylenol/ibuprofen. Pt also reports first-time episode of blood in the urine today, does not think it is vaginal bleeding. She reports no dysuria but reports increased urge. Pt unsure of LMP, states she has had irregular vaginal bleeding since initial fibroid diagnosis in , sees Dr. Tillman outpatient and was referred to GYN ONC for a hysterectomy.  Pt endorses multiple episodes of nonblood, nonbilious emesis today and 1 episode on  along with decreased PO intake. Denies fevers at home but 100.6F recorded in ED. Denies chills, headaches, constipation/diarrhea, hematochezia or melena.     ED Course: received 1L fluid bolus x2. S/p Zosyn, Vancomycin, Zofran, Tylenol, solumedrol. Received 4mg IV morphine with relief. Urine, blood cx collected.    Above appreciated       PAST MEDICAL & SURGICAL HISTORY:  Hypertension      Diabetes- type 2       Asthma      Ulcerative colitis      Sleep apnea      Anemia      S/P dilation and curettage          Social History:  Tobacco - denies   ETOH - occasionally   Illicit drug abuse - denies    FAMILY HISTORY:  Family history of diabetes mellitus (Father)        Allergies    codeine (Nausea)  IV Contrast (Other; Pruritus; Hives)    Intolerances        HOME MEDICATIONS :     Home Medications:  metFORMIN 500 mg oral tablet: 1 tab(s) orally 2 times a day (2022 00:19)  Valsartan   Amlodipine   Atorvastatin   Metoprolol   patient unsure of other medications and doses     REVIEW OF SYSTEMS:    As above all other systems are reviewed and are negative       MEDICATIONS  (STANDING):  amLODIPine   Tablet 10 milliGRAM(s) Oral daily  atorvastatin 10 milliGRAM(s) Oral at bedtime  budesonide  80 MICROgram(s)/formoterol 4.5 MICROgram(s) Inhaler 2 Puff(s) Inhalation two times a day  clindamycin IVPB      clindamycin IVPB 900 milliGRAM(s) IV Intermittent once  clindamycin IVPB 900 milliGRAM(s) IV Intermittent every 8 hours  dextrose 5%. 1000 milliLiter(s) (100 mL/Hr) IV Continuous <Continuous>  dextrose 5%. 1000 milliLiter(s) (50 mL/Hr) IV Continuous <Continuous>  dextrose 50% Injectable 25 Gram(s) IV Push once  dextrose 50% Injectable 12.5 Gram(s) IV Push once  dextrose 50% Injectable 25 Gram(s) IV Push once  ferrous    sulfate 325 milliGRAM(s) Oral daily  glucagon  Injectable 1 milliGRAM(s) IntraMuscular once  insulin lispro (ADMELOG) corrective regimen sliding scale   SubCutaneous every 6 hours  ketorolac   Injectable 15 milliGRAM(s) IV Push every 6 hours  lactated ringers Bolus 1000 milliLiter(s) IV Bolus once  lactated ringers. 1000 milliLiter(s) (185 mL/Hr) IV Continuous <Continuous>  metoprolol succinate ER 25 milliGRAM(s) Oral daily  pantoprazole    Tablet 20 milliGRAM(s) Oral daily  piperacillin/tazobactam IVPB.. 3.375 Gram(s) IV Intermittent every 8 hours  potassium chloride    Tablet ER 10 milliEquivalent(s) Oral daily  tiotropium 2.5 MICROgram(s) Inhaler 2 Puff(s) Inhalation daily    MEDICATIONS  (PRN):  acetaminophen     Tablet .. 975 milliGRAM(s) Oral every 6 hours PRN Moderate Pain (4 - 6)  albuterol    90 MICROgram(s) HFA Inhaler 2 Puff(s) Inhalation every 6 hours PRN Shortness of Breath and/or Wheezing  dextrose Oral Gel 15 Gram(s) Oral once PRN Blood Glucose LESS THAN 70 milliGRAM(s)/deciliter  mesalamine Suppository 1000 milliGRAM(s) Rectal at bedtime PRN ulcerative colitis  methocarbamol 500 milliGRAM(s) Oral two times a day PRN Muscle Spasm  morphine  - Injectable 4 milliGRAM(s) IV Push every 6 hours PRN Severe Pain (7 - 10)      Vital Signs Last 24 Hrs  T(C): 37.1 (2022 04:48), Max: 37.9 (2022 03:25)  T(F): 98.8 (2022 04:48), Max: 100.2 (2022 03:25)  HR: 104 (2022 12:49) (102 - 136)  BP: 92/52 (2022 12:49) (90/58 - 117/76)  RR: 20 (2022 12:49) (18 - 20)  SpO2: 96% (2022 07:00) (93% - 98%)    Parameters below as of 2022 07:00  Patient On (Oxygen Delivery Method): nasal cannula  O2 Flow (L/min): 2      PHYSICAL EXAM:    GENERAL: NAD, well-groomed, obese ,   on 2 L NC   HEAD:  Atraumatic, Normocephalic  EYES: EOMI, PERRLA, conjunctiva and sclera clear  NECK: Supple, No JVD, Normal thyroid  NERVOUS SYSTEM:  Alert & Oriented X4, no focal deficit   CHEST/LUNG: CTA  b/l,  no rales, rhonchi, wheezing, or rubs  HEART: Regular rate and rhythm; No murmurs, rubs, or gallops  ABDOMEN: distended ,mass+ , tender , no rebound , no guarding   EXTREMITIES:  2+ Peripheral Pulses, No clubbing, cyanosis, or edema ,   LYMPH: No lymphadenopathy noted  SKIN: No rashes or lesions    LABS:                        9.3    16.86 )-----------( 481      ( 2022 07:50 )             30.0     11-30    134<L>  |  97  |  29.6<H>  ----------------------------<  132<H>  4.6   |  21.0<L>  |  1.95<H>    Ca    9.0      2022 13:15    TPro  7.6  /  Alb  2.7<L>  /  TBili  1.4  /  DBili  x   /  AST  15  /  ALT  14  /  AlkPhos  64  11-30    Lactate, Blood (22 @ 13:15)    Lactate, Blood: 3.1 mmol/L    Blood Gas Venous - Lactate (22 @ 11:41)    Blood Gas Venous - Lactate: 2.60:         Urinalysis Basic - ( 2022 19:02 )    Color: Yellow / Appearance: Clear / S.010 / pH: x  Gluc: x / Ketone: Trace  / Bili: Small / Urobili: 1   Blood: x / Protein: 30 mg/dL / Nitrite: Positive   Leuk Esterase: Trace / RBC: 0-2 /HPF / WBC 3-5 /HPF   Sq Epi: x / Non Sq Epi: Few / Bacteria: Few          RADIOLOGY & ADDITIONAL STUDIES:    < from: CT Abdomen and Pelvis w/ IV Cont (22 @ 17:20) >    ACC: [id number] EXAM:  CT ABDOMEN AND PELVIS IC                          PROCEDURE DATE:  2022        < end of copied text >  < from: CT Abdomen and Pelvis w/ IV Cont (22 @ 17:20) >    IMPRESSION:  Markedly abnormal and enlarged uterus with suspected necrosis of a 16.3   cm right fundal uterine mass. The mass contains small foci of gas,   raising concern for superimposed infection.    Abnormal small to moderate amount of abdominal and pelvic ascites with   anterior peritoneal soft tissue stranding. In the presence of thelarge   uterine mass, differential considerations does include possible uterine   malignancy such as leiomyosarcoma with peritoneal carcinomatosis.    Recommend GYN consultation. Further evaluation with MRI of the pelvis   should also be considered.    VERTEBRAL BODY ANALYSIS: No Vertebral fracture or low bone density   identified.        --- End of Report ---   EKG( 22) - sinus tachycardia at 132        Yes

## 2025-04-22 NOTE — ED PROVIDER NOTE - PROGRESS NOTE DETAILS
Oleg Escobar, EM NP Fellow: Labs pertinent for pro-bnp 1200, otherwise nonactionable, UA negative for infection, RVP negative, CXR negative for pneumonia. All lab and radiology results have been discussed with patient. HR now 90s-low 100s. Patient remains without CP or SOB. Patient is ready for discharge home. Patient provided strict return precautions, provided time to ask questions which were answered at the bedside by me. Patient to follow up with primary care doctor.

## 2025-04-22 NOTE — ED PROVIDER NOTE - PATIENT PORTAL LINK FT
You can access the FollowMyHealth Patient Portal offered by [facility] by registering at the following website: [email or web address]. By joining [facility]’s FollowMyHealth portal, you will also be able to view your health information using other applications (apps) compatible with our system.

## 2025-04-22 NOTE — ED PROVIDER NOTE - NSFOLLOWUPINSTRUCTIONS_ED_ALL_ED_FT
- You were seen in the ER today for weakness, you were found to be in atrial fibrillation with rapid ventricular response (rapid rate).     - Blood work, flu covid swab, urinalysis was performed which were within normal limits, please see attached results below.    - Please follow up with your primary care doctor in the next 48-72 hours, bring a copy of your results. Please discuss your mildly elevated pro-bnp 1237 AND discuss increasing your daily metoprolol dosage.     - Patient return to the ER for severe chest pain, pain radiating to the arm, jaw, or back, nausea or uncontrolled vomiting, difficulty breathing, palpitations, passing out, change in behavior or mental status, or any other concerns.

## 2025-04-22 NOTE — ED ADULT NURSE NOTE - NSFALLHARMRISKINTERV_ED_ALL_ED

## 2025-04-22 NOTE — ED PROVIDER NOTE - NSICDXPASTSURGICALHX_GEN_ALL_CORE_FT
PAST SURGICAL HISTORY:  H/O cardiac catheterization in 2012 in [facility]- denies intervention

## 2025-04-22 NOTE — ED PROVIDER NOTE - CLINICAL SUMMARY MEDICAL DECISION MAKING FREE TEXT BOX
Oleg Escobar, EM NP Fellow: Patient is 70-year-old female PMH COPD, HTN, A-fib on diltiazem, metoprolol, Eliquis presents with complaint of woke up this morning with generalized weakness, went to the urgent care where they found her to be in A-fib RVR rate 130s, referred patient to ER for further evaluation.   Patient states runny nose but endorses chronic sinusitis, patient also with dysuria.  Patient denies, chills, cough, CP, SOB, ABD pain, back pain, leg pain or swelling.  Patient is nontoxic appearing.  PE as above pertinent for heart rate irregularly irregular and tachycardic to 130s.  Lungs CTA B/L, ABD soft, nondistended, non-TTP.  DDx concerning for but not limited to A-fib RVR 2/2 infection vs infarction.  EKG with A-fib rate 131, a few QRS complexes with variation in height, concern for electrolyte alternations, bedside POCUS performed without evidence of pericardial effusion.  Will order labs, RVP, UA, CXR and reassess.  Disposition pending results and reassessment.

## 2025-04-22 NOTE — ED ADULT NURSE NOTE - OBJECTIVE STATEMENT
69 yo female, A&Ox4, presents to ED with complaints of weakness, SOB, reports was seen at urgent care today and sent to ER for abnormal EKG., unknown rhythm. Patient denies chest pain, dizziness, diaphoresis.  Hx of COPD, HTN, Afib

## 2025-04-22 NOTE — ED ADULT TRIAGE NOTE - CHIEF COMPLAINT QUOTE
Patient walked in with complaints of weakness and SOB, reports was seen at urgent care today and sent to ER for abnormal EKG. PMx: COPD, HTN, Afib   Denies any chest pain, dizziness, and lightheadedness.

## 2025-04-29 ENCOUNTER — APPOINTMENT (OUTPATIENT)
Dept: PULMONOLOGY | Facility: CLINIC | Age: 70
End: 2025-04-29

## 2025-04-29 ENCOUNTER — APPOINTMENT (OUTPATIENT)
Facility: CLINIC | Age: 70
End: 2025-04-29

## 2025-05-05 ENCOUNTER — EMERGENCY (EMERGENCY)
Facility: HOSPITAL | Age: 70
LOS: 0 days | Discharge: HOME | End: 2025-05-05
Attending: STUDENT IN AN ORGANIZED HEALTH CARE EDUCATION/TRAINING PROGRAM
Payer: MEDICARE

## 2025-05-05 VITALS
HEART RATE: 70 BPM | OXYGEN SATURATION: 94 % | WEIGHT: 164.02 LBS | RESPIRATION RATE: 17 BRPM | DIASTOLIC BLOOD PRESSURE: 80 MMHG | SYSTOLIC BLOOD PRESSURE: 122 MMHG | HEIGHT: 66 IN | TEMPERATURE: 98 F

## 2025-05-05 VITALS
DIASTOLIC BLOOD PRESSURE: 82 MMHG | RESPIRATION RATE: 18 BRPM | OXYGEN SATURATION: 96 % | SYSTOLIC BLOOD PRESSURE: 162 MMHG | TEMPERATURE: 98 F | HEART RATE: 65 BPM

## 2025-05-05 DIAGNOSIS — I27.20 PULMONARY HYPERTENSION, UNSPECIFIED: ICD-10-CM

## 2025-05-05 DIAGNOSIS — I48.91 UNSPECIFIED ATRIAL FIBRILLATION: ICD-10-CM

## 2025-05-05 DIAGNOSIS — J44.89 OTHER SPECIFIED CHRONIC OBSTRUCTIVE PULMONARY DISEASE: ICD-10-CM

## 2025-05-05 DIAGNOSIS — R06.4 HYPERVENTILATION: ICD-10-CM

## 2025-05-05 DIAGNOSIS — I10 ESSENTIAL (PRIMARY) HYPERTENSION: ICD-10-CM

## 2025-05-05 DIAGNOSIS — E78.5 HYPERLIPIDEMIA, UNSPECIFIED: ICD-10-CM

## 2025-05-05 DIAGNOSIS — Z79.01 LONG TERM (CURRENT) USE OF ANTICOAGULANTS: ICD-10-CM

## 2025-05-05 LAB
ALBUMIN SERPL ELPH-MCNC: 4 G/DL — SIGNIFICANT CHANGE UP (ref 3.3–5)
ALP SERPL-CCNC: 52 U/L — SIGNIFICANT CHANGE UP (ref 40–120)
ALT FLD-CCNC: 17 U/L — SIGNIFICANT CHANGE UP (ref 12–78)
ANION GAP SERPL CALC-SCNC: 5 MMOL/L — SIGNIFICANT CHANGE UP (ref 5–17)
AST SERPL-CCNC: 11 U/L — LOW (ref 15–37)
BASOPHILS # BLD AUTO: 0.01 K/UL — SIGNIFICANT CHANGE UP (ref 0–0.2)
BASOPHILS NFR BLD AUTO: 0.2 % — SIGNIFICANT CHANGE UP (ref 0–2)
BILIRUB SERPL-MCNC: 0.9 MG/DL — SIGNIFICANT CHANGE UP (ref 0.2–1.2)
BUN SERPL-MCNC: 8 MG/DL — SIGNIFICANT CHANGE UP (ref 7–23)
CALCIUM SERPL-MCNC: 9 MG/DL — SIGNIFICANT CHANGE UP (ref 8.5–10.1)
CHLORIDE SERPL-SCNC: 112 MMOL/L — HIGH (ref 96–108)
CO2 SERPL-SCNC: 24 MMOL/L — SIGNIFICANT CHANGE UP (ref 22–31)
CREAT SERPL-MCNC: 0.79 MG/DL — SIGNIFICANT CHANGE UP (ref 0.5–1.3)
D DIMER BLD IA.RAPID-MCNC: <150 NG/ML DDU — SIGNIFICANT CHANGE UP
EGFR: 80 ML/MIN/1.73M2 — SIGNIFICANT CHANGE UP
EGFR: 80 ML/MIN/1.73M2 — SIGNIFICANT CHANGE UP
EOSINOPHIL # BLD AUTO: 0.05 K/UL — SIGNIFICANT CHANGE UP (ref 0–0.5)
EOSINOPHIL NFR BLD AUTO: 0.9 % — SIGNIFICANT CHANGE UP (ref 0–6)
FLUAV AG NPH QL: SIGNIFICANT CHANGE UP
FLUBV AG NPH QL: SIGNIFICANT CHANGE UP
GLUCOSE SERPL-MCNC: 99 MG/DL — SIGNIFICANT CHANGE UP (ref 70–99)
HCT VFR BLD CALC: 35.4 % — SIGNIFICANT CHANGE UP (ref 34.5–45)
HGB BLD-MCNC: 12.2 G/DL — SIGNIFICANT CHANGE UP (ref 11.5–15.5)
IMM GRANULOCYTES NFR BLD AUTO: 0.4 % — SIGNIFICANT CHANGE UP (ref 0–0.9)
LIDOCAIN IGE QN: 22 U/L — SIGNIFICANT CHANGE UP (ref 13–75)
LYMPHOCYTES # BLD AUTO: 1.25 K/UL — SIGNIFICANT CHANGE UP (ref 1–3.3)
LYMPHOCYTES # BLD AUTO: 22.2 % — SIGNIFICANT CHANGE UP (ref 13–44)
MAGNESIUM SERPL-MCNC: 2 MG/DL — SIGNIFICANT CHANGE UP (ref 1.6–2.6)
MCHC RBC-ENTMCNC: 32.5 PG — SIGNIFICANT CHANGE UP (ref 27–34)
MCHC RBC-ENTMCNC: 34.5 G/DL — SIGNIFICANT CHANGE UP (ref 32–36)
MCV RBC AUTO: 94.4 FL — SIGNIFICANT CHANGE UP (ref 80–100)
MONOCYTES # BLD AUTO: 0.28 K/UL — SIGNIFICANT CHANGE UP (ref 0–0.9)
MONOCYTES NFR BLD AUTO: 5 % — SIGNIFICANT CHANGE UP (ref 2–14)
NEUTROPHILS # BLD AUTO: 4.01 K/UL — SIGNIFICANT CHANGE UP (ref 1.8–7.4)
NEUTROPHILS NFR BLD AUTO: 71.3 % — SIGNIFICANT CHANGE UP (ref 43–77)
NRBC BLD AUTO-RTO: 0 /100 WBCS — SIGNIFICANT CHANGE UP (ref 0–0)
NT-PROBNP SERPL-SCNC: 93 PG/ML — SIGNIFICANT CHANGE UP (ref 0–125)
PLATELET # BLD AUTO: 193 K/UL — SIGNIFICANT CHANGE UP (ref 150–400)
POTASSIUM SERPL-MCNC: 3.6 MMOL/L — SIGNIFICANT CHANGE UP (ref 3.5–5.3)
POTASSIUM SERPL-SCNC: 3.6 MMOL/L — SIGNIFICANT CHANGE UP (ref 3.5–5.3)
PROT SERPL-MCNC: 8.3 GM/DL — SIGNIFICANT CHANGE UP (ref 6–8.3)
RBC # BLD: 3.75 M/UL — LOW (ref 3.8–5.2)
RBC # FLD: 12.5 % — SIGNIFICANT CHANGE UP (ref 10.3–14.5)
RSV RNA NPH QL NAA+NON-PROBE: SIGNIFICANT CHANGE UP
SARS-COV-2 RNA SPEC QL NAA+PROBE: SIGNIFICANT CHANGE UP
SODIUM SERPL-SCNC: 141 MMOL/L — SIGNIFICANT CHANGE UP (ref 135–145)
SOURCE RESPIRATORY: SIGNIFICANT CHANGE UP
TROPONIN I, HIGH SENSITIVITY RESULT: 6.7 NG/L — SIGNIFICANT CHANGE UP
WBC # BLD: 5.62 K/UL — SIGNIFICANT CHANGE UP (ref 3.8–10.5)
WBC # FLD AUTO: 5.62 K/UL — SIGNIFICANT CHANGE UP (ref 3.8–10.5)

## 2025-05-05 PROCEDURE — 71045 X-RAY EXAM CHEST 1 VIEW: CPT | Mod: 26

## 2025-05-05 PROCEDURE — 99284 EMERGENCY DEPT VISIT MOD MDM: CPT

## 2025-05-05 PROCEDURE — 93010 ELECTROCARDIOGRAM REPORT: CPT

## 2025-05-05 NOTE — ED PROVIDER NOTE - CLINICAL SUMMARY MEDICAL DECISION MAKING FREE TEXT BOX
71yo F pmhx of afib (on eliquis), copd, asthma, HTN,  HLD, pulmonary HTN comes to ED w/ previous episodes of hyperventilation. Started randomly, however patient reports being nervous for a trip to [place]. Their pain/symptom is moderate, episodic, non mediating with rest. Otherwise ROS negative.    General: non-toxic, NAD   HEENT: NCAT, PERRL   Cardiac: RRR, no murmurs, 2+ peripheral pulses   Chest: CTA   Abdomen: soft, non-distended, bowel sounds present, no ttp, no rebound or guarding   Extremities: no peripheral edema, calf tenderness, or leg size discrepancies   Skin: no rashes   Neuro: AAOx4, 5+motor, sensory grossly intact   Psych: mood and affect appropriate    Impression: 71yo F pmhx of afib (on eliquis), copd, asthma, HTN,  HLD, pulmonary HTN comes to ED w/ previous episodes of hyperventilation. Their symptoms and exam findings are concerning for anxiety, panic attack, metabolic abnormality, viral illness. Plan to eval for cardiac pathology. If results normal plan for follow up outpatient.    Ordered labs, imaging, medications for diagnosis, management, and treatment.

## 2025-05-05 NOTE — ED PROVIDER NOTE - PROGRESS NOTE DETAILS
Patient reports improvement on symptoms. Spoke to patient/family about results including incidental findings. Patient passed p.o. challenge and ambulatory challenge. Spoke to patient/family about lack of urgent or emergent pathology requiring admission at this time. Plan to discharge patient. Patient given cardiology, PCP follow up and return precautions. Patient agrees with plan.

## 2025-05-05 NOTE — ED ADULT NURSE NOTE - OBJECTIVE STATEMENT
71 yo female, A&Ox4. BIBA from home, Patient reports she was shopping with her daughter and felt her chest getting tight yesterday, applied her PRN oxygen at 2L and felt "a little better". States overnight shortness of breath worsened and was unable to sleep.  Denies fever, cough, recent illness, travel or sick contacts, orthopnea, leg swelling. Patient is calm, able to speak in full sentences. Hx of afib, copd, asthma, HTN,  HLD, pulmonary HTN.

## 2025-05-05 NOTE — ED ADULT TRIAGE NOTE - CHIEF COMPLAINT QUOTE
Pt A&Ox4. Pt BIBA. As per EMS pt c/o SOB since yesterday. H/o afib, copd, asthma, HTN,  HLD, pulmonary HTN. Pt has a trip to [place] coming up and states may have been anxious because of it. Pt airway patent at this time, VSS nad. Pt speaking in clear complete sentences at this time. NKDA. Pt did not take any of her morning meds.

## 2025-05-05 NOTE — ED ADULT NURSE NOTE - NSFALLHARMRISKINTERV_ED_ALL_ED

## 2025-05-05 NOTE — ED PROVIDER NOTE - NSFOLLOWUPCLINICS_GEN_ALL_ED_FT
Cardiology at [place] ([facility])  Cardiology  [address], [address]  [place] [address]  Phone: [phone]  Fax:     Cardiology at [facility]  Cardiology  [address] [place] [address]  Phone: [phone]  Fax:     Cardiology at [facility]  Cardiology  [address], [address] [place] [address]  Phone: [phone]  Fax:     Cardiology at [facility]  Cardiology  [address] [place] [address]  Phone: [phone]  Fax:     Cardiology at [facility]  Cardiology  [address] [place] [address]  Phone: [phone]  Fax:     [facility]  Cardiology  [address], [address]  [place] [address]  Phone: [phone]  Fax:     [facility]  Cardiology  [address] [place] [address]  Phone: [phone]  Fax:

## 2025-05-05 NOTE — ED PROVIDER NOTE - NSFOLLOWUPINSTRUCTIONS_ED_ALL_ED_FT
You were seen in the Emergency Department for an episode of hyperventilation.     1) Advance activity as tolerated.   2) Continue all previously prescribed medications as directed.    3) Follow up with cardiology and your primary care physician in 1 week - take copies of your results.    4) Return to the Emergency Department for worsening or persistent symptoms, and/or ANY NEW OR CONCERNING SYMPTOMS.

## 2025-05-05 NOTE — ED PROVIDER NOTE - NSFOLLOWUPCLINICSTOKEN_GEN_ALL_ED_FT
[id number]: || ||00\01||False;[id number]: || ||00\01||False;[id number]: || ||00\01||False;[id number]: || ||00\01||False;[id number]: || ||00\01||False;[id number]: || ||00\01||False;[id number]: || ||00\01||False;

## 2025-05-05 NOTE — ED ADULT NURSE NOTE - CHIEF COMPLAINT QUOTE
Pt A&Ox4. Pt BIBA. As per EMS pt c/o SOB since yesterday. H/o afib, copd, asthma, HTN,  HLD, pulmonary HTN. Pt has a trip to [place] coming up and states may have been anxious because of it. Pt airway patent at this time, VSS nad. Pt speaking in clear complete sentences at this time. NKDA. Pt did not take any of her morning meds.

## 2025-05-05 NOTE — ED PROVIDER NOTE - PATIENT PORTAL LINK FT
You can access the FollowMyHealth Patient Portal offered by [facility] by registering at the following website: [email or web address]. By joining [facility]’s FollowMyHealth portal, you will also be able to view your health information using other applications (apps) compatible with our system.

## 2025-06-12 ENCOUNTER — NON-APPOINTMENT (OUTPATIENT)
Age: 70
End: 2025-06-12

## 2025-06-17 ENCOUNTER — NON-APPOINTMENT (OUTPATIENT)
Age: 70
End: 2025-06-17

## 2025-06-17 ENCOUNTER — APPOINTMENT (OUTPATIENT)
Dept: PULMONOLOGY | Facility: CLINIC | Age: 70
End: 2025-06-17
Payer: MEDICARE

## 2025-06-17 ENCOUNTER — OUTPATIENT (OUTPATIENT)
Dept: OUTPATIENT SERVICES | Facility: HOSPITAL | Age: 70
LOS: 1 days | End: 2025-06-17
Payer: MEDICARE

## 2025-06-17 VITALS
BODY MASS INDEX: 26.2 KG/M2 | OXYGEN SATURATION: 92 % | DIASTOLIC BLOOD PRESSURE: 72 MMHG | HEIGHT: 66 IN | SYSTOLIC BLOOD PRESSURE: 116 MMHG | TEMPERATURE: 206.6 F | RESPIRATION RATE: 15 BRPM | WEIGHT: 163 LBS | HEART RATE: 67 BPM

## 2025-06-17 DIAGNOSIS — A31.0 PULMONARY MYCOBACTERIAL INFECTION: ICD-10-CM

## 2025-06-17 DIAGNOSIS — J44.9 CHRONIC OBSTRUCTIVE PULMONARY DISEASE, UNSPECIFIED: ICD-10-CM

## 2025-06-17 PROCEDURE — 94618 PULMONARY STRESS TESTING: CPT

## 2025-06-17 PROCEDURE — 94726 PLETHYSMOGRAPHY LUNG VOLUMES: CPT | Mod: 26

## 2025-06-17 PROCEDURE — 94726 PLETHYSMOGRAPHY LUNG VOLUMES: CPT

## 2025-06-17 PROCEDURE — 94729 DIFFUSING CAPACITY: CPT

## 2025-06-17 PROCEDURE — 94060 EVALUATION OF WHEEZING: CPT

## 2025-06-17 PROCEDURE — 94727 GAS DIL/WSHOT DETER LNG VOL: CPT

## 2025-06-17 PROCEDURE — 94729 DIFFUSING CAPACITY: CPT | Mod: 26

## 2025-06-17 PROCEDURE — 94618 PULMONARY STRESS TESTING: CPT | Mod: 26

## 2025-06-17 PROCEDURE — 99215 OFFICE O/P EST HI 40 MIN: CPT | Mod: 25

## 2025-06-17 PROCEDURE — 94760 N-INVAS EAR/PLS OXIMETRY 1: CPT

## 2025-06-17 PROCEDURE — 94010 BREATHING CAPACITY TEST: CPT | Mod: 26

## 2025-06-30 NOTE — ED ADULT NURSE NOTE - NS ED NOTE  TALK SOMEONE YN
Message #1 for daughter Roya to return call (C2C on file)     Please review message below from Roya Garza APRN CNP Theresa Robertson, Registered Nurse  [facility]      Sepsis No

## 2025-07-21 DIAGNOSIS — Z01.818 ENCOUNTER FOR OTHER PREPROCEDURAL EXAMINATION: ICD-10-CM

## 2025-07-22 ENCOUNTER — APPOINTMENT (OUTPATIENT)
Dept: PULMONOLOGY | Facility: CLINIC | Age: 70
End: 2025-07-22

## 2025-07-22 ENCOUNTER — NON-APPOINTMENT (OUTPATIENT)
Age: 70
End: 2025-07-22

## 2025-07-22 ENCOUNTER — APPOINTMENT (OUTPATIENT)
Facility: CLINIC | Age: 70
End: 2025-07-22

## 2025-08-04 ENCOUNTER — EMERGENCY (EMERGENCY)
Facility: HOSPITAL | Age: 70
LOS: 0 days | Discharge: HOME | End: 2025-08-04
Payer: MEDICARE

## 2025-08-04 VITALS
OXYGEN SATURATION: 94 % | SYSTOLIC BLOOD PRESSURE: 138 MMHG | DIASTOLIC BLOOD PRESSURE: 76 MMHG | HEART RATE: 59 BPM | TEMPERATURE: 98 F | RESPIRATION RATE: 20 BRPM

## 2025-08-04 VITALS
WEIGHT: 164.02 LBS | TEMPERATURE: 98 F | DIASTOLIC BLOOD PRESSURE: 73 MMHG | HEART RATE: 69 BPM | HEIGHT: 66 IN | RESPIRATION RATE: 18 BRPM | OXYGEN SATURATION: 92 % | SYSTOLIC BLOOD PRESSURE: 120 MMHG

## 2025-08-04 DIAGNOSIS — Z79.01 LONG TERM (CURRENT) USE OF ANTICOAGULANTS: ICD-10-CM

## 2025-08-04 DIAGNOSIS — Z86.19 PERSONAL HISTORY OF OTHER INFECTIOUS AND PARASITIC DISEASES: ICD-10-CM

## 2025-08-04 DIAGNOSIS — I48.91 UNSPECIFIED ATRIAL FIBRILLATION: ICD-10-CM

## 2025-08-04 DIAGNOSIS — I10 ESSENTIAL (PRIMARY) HYPERTENSION: ICD-10-CM

## 2025-08-04 DIAGNOSIS — R07.9 CHEST PAIN, UNSPECIFIED: ICD-10-CM

## 2025-08-04 DIAGNOSIS — R07.89 OTHER CHEST PAIN: ICD-10-CM

## 2025-08-04 DIAGNOSIS — J44.9 CHRONIC OBSTRUCTIVE PULMONARY DISEASE, UNSPECIFIED: ICD-10-CM

## 2025-08-04 DIAGNOSIS — B02.29 OTHER POSTHERPETIC NERVOUS SYSTEM INVOLVEMENT: ICD-10-CM

## 2025-08-04 DIAGNOSIS — J32.0 CHRONIC MAXILLARY SINUSITIS: ICD-10-CM

## 2025-08-04 DIAGNOSIS — Z79.84 LONG TERM (CURRENT) USE OF ORAL HYPOGLYCEMIC DRUGS: ICD-10-CM

## 2025-08-04 DIAGNOSIS — Z99.81 DEPENDENCE ON SUPPLEMENTAL OXYGEN: ICD-10-CM

## 2025-08-04 LAB
ALBUMIN SERPL ELPH-MCNC: 3.9 G/DL — SIGNIFICANT CHANGE UP (ref 3.3–5)
ALP SERPL-CCNC: 53 U/L — SIGNIFICANT CHANGE UP (ref 40–120)
ALT FLD-CCNC: 21 U/L — SIGNIFICANT CHANGE UP (ref 12–78)
ANION GAP SERPL CALC-SCNC: 3 MMOL/L — LOW (ref 5–17)
AST SERPL-CCNC: 14 U/L — LOW (ref 15–37)
BASOPHILS # BLD AUTO: 0.01 K/UL — SIGNIFICANT CHANGE UP (ref 0–0.2)
BASOPHILS NFR BLD AUTO: 0.2 % — SIGNIFICANT CHANGE UP (ref 0–2)
BILIRUB SERPL-MCNC: 0.9 MG/DL — SIGNIFICANT CHANGE UP (ref 0.2–1.2)
BUN SERPL-MCNC: 12 MG/DL — SIGNIFICANT CHANGE UP (ref 7–23)
CALCIUM SERPL-MCNC: 9.2 MG/DL — SIGNIFICANT CHANGE UP (ref 8.5–10.1)
CHLORIDE SERPL-SCNC: 115 MMOL/L — HIGH (ref 96–108)
CO2 SERPL-SCNC: 27 MMOL/L — SIGNIFICANT CHANGE UP (ref 22–31)
CREAT SERPL-MCNC: 0.7 MG/DL — SIGNIFICANT CHANGE UP (ref 0.5–1.3)
EGFR: 92 ML/MIN/1.73M2 — SIGNIFICANT CHANGE UP
EGFR: 92 ML/MIN/1.73M2 — SIGNIFICANT CHANGE UP
EOSINOPHIL # BLD AUTO: 0.04 K/UL — SIGNIFICANT CHANGE UP (ref 0–0.5)
EOSINOPHIL NFR BLD AUTO: 0.7 % — SIGNIFICANT CHANGE UP (ref 0–6)
FLUAV AG NPH QL: SIGNIFICANT CHANGE UP
FLUBV AG NPH QL: SIGNIFICANT CHANGE UP
GLUCOSE SERPL-MCNC: 92 MG/DL — SIGNIFICANT CHANGE UP (ref 70–99)
HCT VFR BLD CALC: 35.3 % — SIGNIFICANT CHANGE UP (ref 34.5–45)
HGB BLD-MCNC: 11.8 G/DL — SIGNIFICANT CHANGE UP (ref 11.5–15.5)
IMM GRANULOCYTES NFR BLD AUTO: 0.3 % — SIGNIFICANT CHANGE UP (ref 0–0.9)
LIDOCAIN IGE QN: 23 U/L — SIGNIFICANT CHANGE UP (ref 13–75)
LYMPHOCYTES # BLD AUTO: 1.76 K/UL — SIGNIFICANT CHANGE UP (ref 1–3.3)
LYMPHOCYTES # BLD AUTO: 28.7 % — SIGNIFICANT CHANGE UP (ref 13–44)
MAGNESIUM SERPL-MCNC: 2.2 MG/DL — SIGNIFICANT CHANGE UP (ref 1.6–2.6)
MCHC RBC-ENTMCNC: 31.7 PG — SIGNIFICANT CHANGE UP (ref 27–34)
MCHC RBC-ENTMCNC: 33.4 G/DL — SIGNIFICANT CHANGE UP (ref 32–36)
MCV RBC AUTO: 94.9 FL — SIGNIFICANT CHANGE UP (ref 80–100)
MONOCYTES # BLD AUTO: 0.35 K/UL — SIGNIFICANT CHANGE UP (ref 0–0.9)
MONOCYTES NFR BLD AUTO: 5.7 % — SIGNIFICANT CHANGE UP (ref 2–14)
NEUTROPHILS # BLD AUTO: 3.95 K/UL — SIGNIFICANT CHANGE UP (ref 1.8–7.4)
NEUTROPHILS NFR BLD AUTO: 64.4 % — SIGNIFICANT CHANGE UP (ref 43–77)
NRBC BLD AUTO-RTO: 0 /100 WBCS — SIGNIFICANT CHANGE UP (ref 0–0)
NT-PROBNP SERPL-SCNC: 74 PG/ML — SIGNIFICANT CHANGE UP (ref 0–125)
PLATELET # BLD AUTO: 191 K/UL — SIGNIFICANT CHANGE UP (ref 150–400)
POTASSIUM SERPL-MCNC: 4 MMOL/L — SIGNIFICANT CHANGE UP (ref 3.5–5.3)
POTASSIUM SERPL-SCNC: 4 MMOL/L — SIGNIFICANT CHANGE UP (ref 3.5–5.3)
PROT SERPL-MCNC: 8.3 GM/DL — SIGNIFICANT CHANGE UP (ref 6–8.3)
RBC # BLD: 3.72 M/UL — LOW (ref 3.8–5.2)
RBC # FLD: 11.9 % — SIGNIFICANT CHANGE UP (ref 10.3–14.5)
RSV RNA NPH QL NAA+NON-PROBE: SIGNIFICANT CHANGE UP
SARS-COV-2 RNA SPEC QL NAA+PROBE: SIGNIFICANT CHANGE UP
SODIUM SERPL-SCNC: 145 MMOL/L — SIGNIFICANT CHANGE UP (ref 135–145)
SOURCE RESPIRATORY: SIGNIFICANT CHANGE UP
TROPONIN I, HIGH SENSITIVITY RESULT: 5.9 NG/L — SIGNIFICANT CHANGE UP
TROPONIN I, HIGH SENSITIVITY RESULT: 6 NG/L — SIGNIFICANT CHANGE UP
WBC # BLD: 6.13 K/UL — SIGNIFICANT CHANGE UP (ref 3.8–10.5)
WBC # FLD AUTO: 6.13 K/UL — SIGNIFICANT CHANGE UP (ref 3.8–10.5)

## 2025-08-04 PROCEDURE — 93010 ELECTROCARDIOGRAM REPORT: CPT

## 2025-08-04 PROCEDURE — 99285 EMERGENCY DEPT VISIT HI MDM: CPT

## 2025-08-04 PROCEDURE — 71046 X-RAY EXAM CHEST 2 VIEWS: CPT | Mod: 26

## 2025-08-04 RX ORDER — AMOXICILLIN AND CLAVULANATE POTASSIUM 500; 125 MG/1; MG/1
1 TABLET, FILM COATED ORAL
Qty: 20 | Refills: 0
Start: 2025-08-04 | End: 2025-08-13

## 2025-08-04 RX ORDER — KETOROLAC TROMETHAMINE 30 MG/ML
15 INJECTION, SOLUTION INTRAMUSCULAR; INTRAVENOUS ONCE
Refills: 0 | Status: DISCONTINUED | OUTPATIENT
Start: 2025-08-04 | End: 2025-08-04

## 2025-08-04 RX ORDER — DEXAMETHASONE 0.5 MG/1
10 TABLET ORAL ONCE
Refills: 0 | Status: COMPLETED | OUTPATIENT
Start: 2025-08-04 | End: 2025-08-04

## 2025-08-04 RX ORDER — AMOXICILLIN AND CLAVULANATE POTASSIUM 500; 125 MG/1; MG/1
1 TABLET, FILM COATED ORAL ONCE
Refills: 0 | Status: COMPLETED | OUTPATIENT
Start: 2025-08-04 | End: 2025-08-04

## 2025-08-04 RX ADMIN — DEXAMETHASONE 10 MILLIGRAM(S): 0.5 TABLET ORAL at 16:48

## 2025-08-04 RX ADMIN — KETOROLAC TROMETHAMINE 15 MILLIGRAM(S): 30 INJECTION, SOLUTION INTRAMUSCULAR; INTRAVENOUS at 15:27

## 2025-08-04 RX ADMIN — AMOXICILLIN AND CLAVULANATE POTASSIUM 1 TABLET(S): 500; 125 TABLET, FILM COATED ORAL at 15:26

## 2025-08-05 ENCOUNTER — APPOINTMENT (OUTPATIENT)
Facility: CLINIC | Age: 70
End: 2025-08-05

## 2025-08-05 DIAGNOSIS — J18.9 PNEUMONIA, UNSPECIFIED ORGANISM: ICD-10-CM

## 2025-08-21 ENCOUNTER — NON-APPOINTMENT (OUTPATIENT)
Age: 70
End: 2025-08-21

## 2025-08-26 ENCOUNTER — APPOINTMENT (OUTPATIENT)
Facility: CLINIC | Age: 70
End: 2025-08-26
Payer: MEDICARE

## 2025-08-26 ENCOUNTER — NON-APPOINTMENT (OUTPATIENT)
Age: 70
End: 2025-08-26

## 2025-08-26 ENCOUNTER — APPOINTMENT (OUTPATIENT)
Dept: PULMONOLOGY | Facility: CLINIC | Age: 70
End: 2025-08-26
Payer: MEDICARE

## 2025-08-26 VITALS
SYSTOLIC BLOOD PRESSURE: 119 MMHG | TEMPERATURE: 208.94 F | WEIGHT: 162 LBS | RESPIRATION RATE: 16 BRPM | OXYGEN SATURATION: 96 % | HEART RATE: 75 BPM | HEIGHT: 66 IN | BODY MASS INDEX: 26.03 KG/M2 | DIASTOLIC BLOOD PRESSURE: 72 MMHG

## 2025-08-26 DIAGNOSIS — A31.0 PULMONARY MYCOBACTERIAL INFECTION: ICD-10-CM

## 2025-08-26 DIAGNOSIS — I25.10 ATHEROSCLEROTIC HEART DISEASE OF NATIVE CORONARY ARTERY W/OUT ANGINA PECTORIS: ICD-10-CM

## 2025-08-26 DIAGNOSIS — J18.9 PNEUMONIA, UNSPECIFIED ORGANISM: ICD-10-CM

## 2025-08-26 DIAGNOSIS — Z01.818 ENCOUNTER FOR OTHER PREPROCEDURAL EXAMINATION: ICD-10-CM

## 2025-08-26 DIAGNOSIS — J45.909 UNSPECIFIED ASTHMA, UNCOMPLICATED: ICD-10-CM

## 2025-08-26 DIAGNOSIS — I27.20 PULMONARY HYPERTENSION, UNSPECIFIED: ICD-10-CM

## 2025-08-26 DIAGNOSIS — I48.91 UNSPECIFIED ATRIAL FIBRILLATION: ICD-10-CM

## 2025-08-26 DIAGNOSIS — R06.09 OTHER FORMS OF DYSPNEA: ICD-10-CM

## 2025-08-26 PROCEDURE — 99205 OFFICE O/P NEW HI 60 MIN: CPT

## 2025-08-26 PROCEDURE — 99215 OFFICE O/P EST HI 40 MIN: CPT

## 2025-09-09 ENCOUNTER — TRANSCRIPTION ENCOUNTER (OUTPATIENT)
Age: 70
End: 2025-09-09

## 2025-09-16 ENCOUNTER — NON-APPOINTMENT (OUTPATIENT)
Age: 70
End: 2025-09-16